# Patient Record
Sex: FEMALE | Race: WHITE | NOT HISPANIC OR LATINO | Employment: OTHER | URBAN - METROPOLITAN AREA
[De-identification: names, ages, dates, MRNs, and addresses within clinical notes are randomized per-mention and may not be internally consistent; named-entity substitution may affect disease eponyms.]

---

## 2017-01-04 ENCOUNTER — TELEPHONE (OUTPATIENT)
Dept: CARDIOLOGY | Facility: CLINIC | Age: 82
End: 2017-01-04

## 2017-03-30 ENCOUNTER — DOCUMENTATION (OUTPATIENT)
Dept: CARDIOLOGY | Facility: CLINIC | Age: 82
End: 2017-03-30

## 2017-04-06 ENCOUNTER — DOCUMENTATION (OUTPATIENT)
Dept: CARDIOLOGY | Facility: CLINIC | Age: 82
End: 2017-04-06

## 2017-04-06 DIAGNOSIS — R07.9 CHEST PAIN, UNSPECIFIED TYPE: Primary | ICD-10-CM

## 2017-04-06 NOTE — PROGRESS NOTES
I called and spoke with the patient. She would like to have the heart cath done here at [facility]. Order placed.

## 2017-04-13 DIAGNOSIS — I20.9 ANGINA PECTORIS (HCC): Primary | ICD-10-CM

## 2017-04-13 RX ORDER — ACETAMINOPHEN 325 MG/1
650 TABLET ORAL EVERY 4 HOURS PRN
Status: CANCELLED | OUTPATIENT
Start: 2017-04-13

## 2017-04-13 RX ORDER — SODIUM CHLORIDE 0.9 % (FLUSH) 0.9 %
1-10 SYRINGE (ML) INJECTION AS NEEDED
Status: CANCELLED | OUTPATIENT
Start: 2017-04-13

## 2017-04-13 RX ORDER — ONDANSETRON 2 MG/ML
4 INJECTION INTRAMUSCULAR; INTRAVENOUS EVERY 6 HOURS PRN
Status: CANCELLED | OUTPATIENT
Start: 2017-04-13

## 2017-04-13 RX ORDER — NITROGLYCERIN 0.4 MG/1
0.4 TABLET SUBLINGUAL
Status: CANCELLED | OUTPATIENT
Start: 2017-04-13

## 2017-04-26 ENCOUNTER — HOSPITAL ENCOUNTER (OUTPATIENT)
Facility: HOSPITAL | Age: 82
Discharge: HOME | End: 2017-04-27
Attending: INTERNAL MEDICINE | Admitting: INTERNAL MEDICINE

## 2017-04-26 DIAGNOSIS — R07.9 CHEST PAIN, UNSPECIFIED TYPE: ICD-10-CM

## 2017-04-26 DIAGNOSIS — I20.9 ANGINA PECTORIS (HCC): ICD-10-CM

## 2017-04-26 LAB
ALBUMIN SERPL-MCNC: 3.9 G/DL (ref 3.2–4.8)
ALBUMIN/GLOB SERPL: 1.5 G/DL (ref 1.5–2.5)
ALP SERPL-CCNC: 50 U/L (ref 25–100)
ALT SERPL W P-5'-P-CCNC: 13 U/L (ref 7–40)
ANION GAP SERPL CALCULATED.3IONS-SCNC: 4 MMOL/L (ref 3–11)
ARTICHOKE IGE QN: 95 MG/DL (ref 0–130)
AST SERPL-CCNC: 17 U/L (ref 0–33)
BILIRUB SERPL-MCNC: 0.5 MG/DL (ref 0.3–1.2)
BUN BLD-MCNC: 18 MG/DL (ref 9–23)
BUN/CREAT SERPL: 18 (ref 7–25)
CALCIUM SPEC-SCNC: 9.8 MG/DL (ref 8.7–10.4)
CHLORIDE SERPL-SCNC: 103 MMOL/L (ref 99–109)
CHOLEST SERPL-MCNC: 181 MG/DL (ref 0–200)
CO2 SERPL-SCNC: 30 MMOL/L (ref 20–31)
CREAT BLD-MCNC: 1 MG/DL (ref 0.6–1.3)
DEPRECATED RDW RBC AUTO: 52.3 FL (ref 37–54)
ERYTHROCYTE [DISTWIDTH] IN BLOOD BY AUTOMATED COUNT: 14.4 % (ref 11.3–14.5)
GFR SERPL CREATININE-BSD FRML MDRD: 53 ML/MIN/1.73
GLOBULIN UR ELPH-MCNC: 2.6 GM/DL
GLUCOSE BLD-MCNC: 103 MG/DL (ref 70–100)
HBA1C MFR BLD: 6.1 % (ref 4.8–5.6)
HCT VFR BLD AUTO: 40.5 % (ref 34.5–44)
HDLC SERPL-MCNC: 46 MG/DL (ref 40–60)
HGB BLD-MCNC: 12.8 G/DL (ref 11.5–15.5)
MCH RBC QN AUTO: 31.5 PG (ref 27–31)
MCHC RBC AUTO-ENTMCNC: 31.6 G/DL (ref 32–36)
MCV RBC AUTO: 99.8 FL (ref 80–99)
PLATELET # BLD AUTO: 185 10*3/MM3 (ref 150–450)
PMV BLD AUTO: 11.1 FL (ref 6–12)
POTASSIUM BLD-SCNC: 4.1 MMOL/L (ref 3.5–5.5)
PROT SERPL-MCNC: 6.5 G/DL (ref 5.7–8.2)
RBC # BLD AUTO: 4.06 10*6/MM3 (ref 3.89–5.14)
SODIUM BLD-SCNC: 137 MMOL/L (ref 132–146)
TRIGL SERPL-MCNC: 224 MG/DL (ref 0–150)
WBC NRBC COR # BLD: 11.58 10*3/MM3 (ref 3.5–10.8)

## 2017-04-26 PROCEDURE — 93571 IV DOP VEL&/PRESS C FLO 1ST: CPT | Performed by: INTERNAL MEDICINE

## 2017-04-26 PROCEDURE — 85027 COMPLETE CBC AUTOMATED: CPT | Performed by: NURSE PRACTITIONER

## 2017-04-26 PROCEDURE — 80053 COMPREHEN METABOLIC PANEL: CPT | Performed by: NURSE PRACTITIONER

## 2017-04-26 PROCEDURE — C1725 CATH, TRANSLUMIN NON-LASER: HCPCS | Performed by: INTERNAL MEDICINE

## 2017-04-26 PROCEDURE — 92928 PRQ TCAT PLMT NTRAC ST 1 LES: CPT | Performed by: INTERNAL MEDICINE

## 2017-04-26 PROCEDURE — C1874 STENT, COATED/COV W/DEL SYS: HCPCS | Performed by: INTERNAL MEDICINE

## 2017-04-26 PROCEDURE — 93454 CORONARY ARTERY ANGIO S&I: CPT | Performed by: INTERNAL MEDICINE

## 2017-04-26 PROCEDURE — 25010000002 HEPARIN (PORCINE) PER 1000 UNITS: Performed by: INTERNAL MEDICINE

## 2017-04-26 PROCEDURE — 80061 LIPID PANEL: CPT | Performed by: NURSE PRACTITIONER

## 2017-04-26 PROCEDURE — 83036 HEMOGLOBIN GLYCOSYLATED A1C: CPT | Performed by: NURSE PRACTITIONER

## 2017-04-26 PROCEDURE — C1769 GUIDE WIRE: HCPCS | Performed by: INTERNAL MEDICINE

## 2017-04-26 PROCEDURE — 93572 IV DOP VEL&/PRESS C FLO EA: CPT | Performed by: INTERNAL MEDICINE

## 2017-04-26 PROCEDURE — C1887 CATHETER, GUIDING: HCPCS | Performed by: INTERNAL MEDICINE

## 2017-04-26 PROCEDURE — 92978 ENDOLUMINL IVUS OCT C 1ST: CPT | Performed by: INTERNAL MEDICINE

## 2017-04-26 PROCEDURE — 99219 PR INITIAL OBSERVATION CARE/DAY 50 MINUTES: CPT | Performed by: INTERNAL MEDICINE

## 2017-04-26 PROCEDURE — 25010000002 FENTANYL CITRATE (PF) 100 MCG/2ML SOLUTION: Performed by: INTERNAL MEDICINE

## 2017-04-26 PROCEDURE — 0 IOPAMIDOL PER 1 ML: Performed by: INTERNAL MEDICINE

## 2017-04-26 PROCEDURE — 25010000002 BIVALIRUDIN PER 1 MG: Performed by: INTERNAL MEDICINE

## 2017-04-26 PROCEDURE — C1894 INTRO/SHEATH, NON-LASER: HCPCS | Performed by: INTERNAL MEDICINE

## 2017-04-26 PROCEDURE — 93005 ELECTROCARDIOGRAM TRACING: CPT | Performed by: PHYSICIAN ASSISTANT

## 2017-04-26 PROCEDURE — C9600 PERC DRUG-EL COR STENT SING: HCPCS | Performed by: INTERNAL MEDICINE

## 2017-04-26 PROCEDURE — G0378 HOSPITAL OBSERVATION PER HR: HCPCS

## 2017-04-26 PROCEDURE — C1753 CATH, INTRAVAS ULTRASOUND: HCPCS | Performed by: INTERNAL MEDICINE

## 2017-04-26 PROCEDURE — 25010000002 MIDAZOLAM PER 1 MG: Performed by: INTERNAL MEDICINE

## 2017-04-26 DEVICE — XIENCE ALPINE EVEROLIMUS ELUTING CORONARY STENT SYSTEM 2.50 MM X 23 MM / RAPID-EXCHANGE
Type: IMPLANTABLE DEVICE | Status: FUNCTIONAL
Brand: XIENCE ALPINE

## 2017-04-26 DEVICE — XIENCE ALPINE EVEROLIMUS ELUTING CORONARY STENT SYSTEM 3.00 MM X 18 MM / RAPID-EXCHANGE
Type: IMPLANTABLE DEVICE | Status: FUNCTIONAL
Brand: XIENCE ALPINE

## 2017-04-26 RX ORDER — ASPIRIN 81 MG/1
TABLET, CHEWABLE ORAL AS NEEDED
Status: DISCONTINUED | OUTPATIENT
Start: 2017-04-26 | End: 2017-04-26 | Stop reason: HOSPADM

## 2017-04-26 RX ORDER — CLONIDINE HYDROCHLORIDE 0.1 MG/1
0.1 TABLET ORAL NIGHTLY
COMMUNITY
End: 2020-08-26 | Stop reason: SDUPTHER

## 2017-04-26 RX ORDER — ISOSORBIDE MONONITRATE 30 MG/1
30 TABLET, EXTENDED RELEASE ORAL EVERY MORNING
COMMUNITY
End: 2020-01-09 | Stop reason: ALTCHOICE

## 2017-04-26 RX ORDER — LIDOCAINE HYDROCHLORIDE 10 MG/ML
INJECTION, SOLUTION INFILTRATION; PERINEURAL AS NEEDED
Status: DISCONTINUED | OUTPATIENT
Start: 2017-04-26 | End: 2017-04-26 | Stop reason: HOSPADM

## 2017-04-26 RX ORDER — HYDRALAZINE HYDROCHLORIDE 20 MG/ML
10 INJECTION INTRAMUSCULAR; INTRAVENOUS EVERY 6 HOURS PRN
Status: DISCONTINUED | OUTPATIENT
Start: 2017-04-26 | End: 2017-04-27 | Stop reason: HOSPADM

## 2017-04-26 RX ORDER — NITROGLYCERIN 0.4 MG/1
0.4 TABLET SUBLINGUAL
COMMUNITY
End: 2020-01-09 | Stop reason: SDUPTHER

## 2017-04-26 RX ORDER — AMLODIPINE BESYLATE 5 MG/1
5 TABLET ORAL EVERY MORNING
Status: DISCONTINUED | OUTPATIENT
Start: 2017-04-27 | End: 2017-04-27

## 2017-04-26 RX ORDER — ATORVASTATIN CALCIUM 40 MG/1
80 TABLET, FILM COATED ORAL NIGHTLY
Status: DISCONTINUED | OUTPATIENT
Start: 2017-04-26 | End: 2017-04-27 | Stop reason: HOSPADM

## 2017-04-26 RX ORDER — ACETAMINOPHEN 325 MG/1
650 TABLET ORAL EVERY 8 HOURS PRN
Status: DISCONTINUED | OUTPATIENT
Start: 2017-04-26 | End: 2017-04-27 | Stop reason: HOSPADM

## 2017-04-26 RX ORDER — SODIUM CHLORIDE 9 MG/ML
1-3 INJECTION, SOLUTION INTRAVENOUS CONTINUOUS
Status: DISCONTINUED | OUTPATIENT
Start: 2017-04-26 | End: 2017-04-27 | Stop reason: HOSPADM

## 2017-04-26 RX ORDER — CLOPIDOGREL BISULFATE 75 MG/1
TABLET ORAL AS NEEDED
Status: DISCONTINUED | OUTPATIENT
Start: 2017-04-26 | End: 2017-04-26 | Stop reason: HOSPADM

## 2017-04-26 RX ORDER — DONEPEZIL HYDROCHLORIDE 5 MG/1
5 TABLET, FILM COATED ORAL EVERY MORNING
COMMUNITY
End: 2019-01-17

## 2017-04-26 RX ORDER — DOFETILIDE 0.12 MG/1
125 CAPSULE ORAL EVERY 12 HOURS SCHEDULED
Status: DISCONTINUED | OUTPATIENT
Start: 2017-04-26 | End: 2017-04-27 | Stop reason: HOSPADM

## 2017-04-26 RX ORDER — TRAMADOL HYDROCHLORIDE 50 MG/1
100 TABLET ORAL EVERY 6 HOURS PRN
Status: DISCONTINUED | OUTPATIENT
Start: 2017-04-26 | End: 2017-04-27 | Stop reason: HOSPADM

## 2017-04-26 RX ORDER — L.RHAMNOSUS/B.ANIMALIS(LACTIS) 3B CELL
1 CAPSULE ORAL EVERY MORNING
COMMUNITY

## 2017-04-26 RX ORDER — PROPRANOLOL HYDROCHLORIDE 80 MG/1
80 TABLET ORAL EVERY MORNING
COMMUNITY
End: 2017-05-17

## 2017-04-26 RX ORDER — ISOSORBIDE MONONITRATE 60 MG/1
30 TABLET, EXTENDED RELEASE ORAL EVERY MORNING
Status: DISCONTINUED | OUTPATIENT
Start: 2017-04-27 | End: 2017-04-27 | Stop reason: HOSPADM

## 2017-04-26 RX ORDER — SODIUM CHLORIDE 9 MG/ML
1 INJECTION, SOLUTION INTRAVENOUS CONTINUOUS
Status: ACTIVE | OUTPATIENT
Start: 2017-04-26 | End: 2017-04-26

## 2017-04-26 RX ORDER — PROPRANOLOL HCL 60 MG
120 CAPSULE, EXTENDED RELEASE 24HR ORAL DAILY
Status: DISCONTINUED | OUTPATIENT
Start: 2017-04-27 | End: 2017-04-27 | Stop reason: HOSPADM

## 2017-04-26 RX ORDER — SENNOSIDES 8.6 MG
1300 CAPSULE ORAL EVERY 8 HOURS PRN
Status: ON HOLD | COMMUNITY
End: 2017-10-10

## 2017-04-26 RX ORDER — AMLODIPINE BESYLATE 5 MG/1
5 TABLET ORAL EVERY MORNING
COMMUNITY
End: 2017-04-27 | Stop reason: HOSPADM

## 2017-04-26 RX ORDER — ESCITALOPRAM OXALATE 10 MG/1
10 TABLET ORAL EVERY MORNING
Status: DISCONTINUED | OUTPATIENT
Start: 2017-04-27 | End: 2017-04-27 | Stop reason: HOSPADM

## 2017-04-26 RX ORDER — MIDAZOLAM HYDROCHLORIDE 1 MG/ML
INJECTION INTRAMUSCULAR; INTRAVENOUS AS NEEDED
Status: DISCONTINUED | OUTPATIENT
Start: 2017-04-26 | End: 2017-04-26 | Stop reason: HOSPADM

## 2017-04-26 RX ORDER — DONEPEZIL HYDROCHLORIDE 5 MG/1
5 TABLET, FILM COATED ORAL EVERY MORNING
Status: DISCONTINUED | OUTPATIENT
Start: 2017-04-27 | End: 2017-04-27 | Stop reason: HOSPADM

## 2017-04-26 RX ORDER — NITROGLYCERIN 0.4 MG/1
0.4 TABLET SUBLINGUAL
Status: DISCONTINUED | OUTPATIENT
Start: 2017-04-26 | End: 2017-04-27 | Stop reason: HOSPADM

## 2017-04-26 RX ORDER — ENALAPRIL MALEATE 5 MG/1
5 TABLET ORAL 2 TIMES DAILY
Status: DISCONTINUED | OUTPATIENT
Start: 2017-04-26 | End: 2017-04-27 | Stop reason: HOSPADM

## 2017-04-26 RX ORDER — NITROGLYCERIN 0.4 MG/1
0.4 TABLET SUBLINGUAL
Status: DISCONTINUED | OUTPATIENT
Start: 2017-04-26 | End: 2017-04-26 | Stop reason: HOSPADM

## 2017-04-26 RX ORDER — CLOPIDOGREL BISULFATE 75 MG/1
75 TABLET ORAL DAILY
Status: DISCONTINUED | OUTPATIENT
Start: 2017-04-27 | End: 2017-04-27 | Stop reason: HOSPADM

## 2017-04-26 RX ORDER — SODIUM CHLORIDE 0.9 % (FLUSH) 0.9 %
1-10 SYRINGE (ML) INJECTION AS NEEDED
Status: DISCONTINUED | OUTPATIENT
Start: 2017-04-26 | End: 2017-04-26 | Stop reason: HOSPADM

## 2017-04-26 RX ORDER — TRAMADOL HYDROCHLORIDE 50 MG/1
50 TABLET ORAL EVERY 6 HOURS PRN
Status: DISCONTINUED | OUTPATIENT
Start: 2017-04-26 | End: 2017-04-26

## 2017-04-26 RX ORDER — CLONIDINE HYDROCHLORIDE 0.1 MG/1
0.1 TABLET ORAL DAILY PRN
Status: DISCONTINUED | OUTPATIENT
Start: 2017-04-26 | End: 2017-04-27 | Stop reason: HOSPADM

## 2017-04-26 RX ORDER — HYDRALAZINE HYDROCHLORIDE 20 MG/ML
20 INJECTION INTRAMUSCULAR; INTRAVENOUS EVERY 6 HOURS PRN
Status: DISCONTINUED | OUTPATIENT
Start: 2017-04-26 | End: 2017-04-26

## 2017-04-26 RX ORDER — PROPRANOLOL HYDROCHLORIDE 40 MG/1
20 TABLET ORAL EVERY MORNING
COMMUNITY
End: 2017-05-17

## 2017-04-26 RX ORDER — PANTOPRAZOLE SODIUM 40 MG/1
40 TABLET, DELAYED RELEASE ORAL
Status: DISCONTINUED | OUTPATIENT
Start: 2017-04-27 | End: 2017-04-27 | Stop reason: HOSPADM

## 2017-04-26 RX ORDER — FENTANYL CITRATE 50 UG/ML
INJECTION, SOLUTION INTRAMUSCULAR; INTRAVENOUS AS NEEDED
Status: DISCONTINUED | OUTPATIENT
Start: 2017-04-26 | End: 2017-04-26 | Stop reason: HOSPADM

## 2017-04-26 RX ORDER — TRAMADOL HYDROCHLORIDE 50 MG/1
50 TABLET ORAL 3 TIMES DAILY
COMMUNITY

## 2017-04-26 RX ORDER — ONDANSETRON 2 MG/ML
4 INJECTION INTRAMUSCULAR; INTRAVENOUS EVERY 6 HOURS PRN
Status: DISCONTINUED | OUTPATIENT
Start: 2017-04-26 | End: 2017-04-26 | Stop reason: HOSPADM

## 2017-04-26 RX ORDER — ACETAMINOPHEN 325 MG/1
650 TABLET ORAL EVERY 4 HOURS PRN
Status: DISCONTINUED | OUTPATIENT
Start: 2017-04-26 | End: 2017-04-26 | Stop reason: HOSPADM

## 2017-04-26 RX ADMIN — ENALAPRIL MALEATE 5 MG: 5 TABLET ORAL at 18:44

## 2017-04-26 RX ADMIN — TRAMADOL HYDROCHLORIDE 100 MG: 50 TABLET, COATED ORAL at 18:43

## 2017-04-26 RX ADMIN — DOFETILIDE 125 MCG: 0.12 CAPSULE ORAL at 21:22

## 2017-04-26 RX ADMIN — SODIUM CHLORIDE 2.99 ML/KG/HR: 9 INJECTION, SOLUTION INTRAVENOUS at 09:53

## 2017-04-26 RX ADMIN — ATORVASTATIN CALCIUM 80 MG: 40 TABLET, FILM COATED ORAL at 21:22

## 2017-04-27 ENCOUNTER — DOCUMENTATION (OUTPATIENT)
Dept: CARDIAC REHAB | Facility: HOSPITAL | Age: 82
End: 2017-04-27

## 2017-04-27 VITALS
BODY MASS INDEX: 25.27 KG/M2 | DIASTOLIC BLOOD PRESSURE: 53 MMHG | HEART RATE: 69 BPM | SYSTOLIC BLOOD PRESSURE: 105 MMHG | TEMPERATURE: 97.7 F | HEIGHT: 65 IN | WEIGHT: 151.68 LBS | RESPIRATION RATE: 18 BRPM | OXYGEN SATURATION: 95 %

## 2017-04-27 LAB
ANION GAP SERPL CALCULATED.3IONS-SCNC: 5 MMOL/L (ref 3–11)
BUN BLD-MCNC: 14 MG/DL (ref 9–23)
BUN/CREAT SERPL: 14 (ref 7–25)
CALCIUM SPEC-SCNC: 9.7 MG/DL (ref 8.7–10.4)
CHLORIDE SERPL-SCNC: 106 MMOL/L (ref 99–109)
CO2 SERPL-SCNC: 28 MMOL/L (ref 20–31)
CREAT BLD-MCNC: 1 MG/DL (ref 0.6–1.3)
DEPRECATED RDW RBC AUTO: 51.8 FL (ref 37–54)
ERYTHROCYTE [DISTWIDTH] IN BLOOD BY AUTOMATED COUNT: 14.4 % (ref 11.3–14.5)
GFR SERPL CREATININE-BSD FRML MDRD: 53 ML/MIN/1.73
GLUCOSE BLD-MCNC: 101 MG/DL (ref 70–100)
HCT VFR BLD AUTO: 41 % (ref 34.5–44)
HGB BLD-MCNC: 12.9 G/DL (ref 11.5–15.5)
MCH RBC QN AUTO: 31.1 PG (ref 27–31)
MCHC RBC AUTO-ENTMCNC: 31.5 G/DL (ref 32–36)
MCV RBC AUTO: 98.8 FL (ref 80–99)
PLATELET # BLD AUTO: 172 10*3/MM3 (ref 150–450)
PMV BLD AUTO: 11.6 FL (ref 6–12)
POTASSIUM BLD-SCNC: 3.8 MMOL/L (ref 3.5–5.5)
RBC # BLD AUTO: 4.15 10*6/MM3 (ref 3.89–5.14)
SODIUM BLD-SCNC: 139 MMOL/L (ref 132–146)
WBC NRBC COR # BLD: 12.7 10*3/MM3 (ref 3.5–10.8)

## 2017-04-27 PROCEDURE — G0378 HOSPITAL OBSERVATION PER HR: HCPCS

## 2017-04-27 PROCEDURE — 85027 COMPLETE CBC AUTOMATED: CPT | Performed by: INTERNAL MEDICINE

## 2017-04-27 PROCEDURE — 99217 PR OBSERVATION CARE DISCHARGE MANAGEMENT: CPT | Performed by: INTERNAL MEDICINE

## 2017-04-27 PROCEDURE — 80048 BASIC METABOLIC PNL TOTAL CA: CPT | Performed by: INTERNAL MEDICINE

## 2017-04-27 RX ORDER — CLOPIDOGREL BISULFATE 75 MG/1
75 TABLET ORAL DAILY
Qty: 30 TABLET | Refills: 11 | Status: SHIPPED | OUTPATIENT
Start: 2017-04-27 | End: 2018-10-17

## 2017-04-27 RX ADMIN — AMLODIPINE BESYLATE 5 MG: 5 TABLET ORAL at 07:16

## 2017-04-27 RX ADMIN — CLOPIDOGREL BISULFATE 75 MG: 75 TABLET ORAL at 08:07

## 2017-04-27 RX ADMIN — PANTOPRAZOLE SODIUM 40 MG: 40 TABLET, DELAYED RELEASE ORAL at 07:08

## 2017-04-27 RX ADMIN — PROPRANOLOL HYDROCHLORIDE 120 MG: 60 CAPSULE, EXTENDED RELEASE ORAL at 08:07

## 2017-04-27 RX ADMIN — DOFETILIDE 125 MCG: 0.12 CAPSULE ORAL at 08:07

## 2017-04-27 RX ADMIN — DONEPEZIL HYDROCHLORIDE 5 MG: 5 TABLET, FILM COATED ORAL at 07:17

## 2017-04-27 RX ADMIN — ENALAPRIL MALEATE 5 MG: 5 TABLET ORAL at 08:07

## 2017-04-27 RX ADMIN — ESCITALOPRAM OXALATE 10 MG: 10 TABLET ORAL at 07:17

## 2017-04-27 RX ADMIN — APIXABAN 2.5 MG: 2.5 TABLET, FILM COATED ORAL at 08:07

## 2017-04-27 RX ADMIN — ISOSORBIDE MONONITRATE 30 MG: 60 TABLET, EXTENDED RELEASE ORAL at 07:17

## 2017-04-27 NOTE — PROGRESS NOTES
Pt. Referred for Phase II Cardiac Rehab. Staff discussed benefits of exercise, program protocol, and educational material provided. Teach back verified.  Permission granted from patient for staff to fax referral information to outlying program at this time.  Staff to fax referral info to [facility].

## 2017-05-17 ENCOUNTER — OFFICE VISIT (OUTPATIENT)
Dept: CARDIOLOGY | Facility: CLINIC | Age: 82
End: 2017-05-17

## 2017-05-17 VITALS
BODY MASS INDEX: 25.19 KG/M2 | HEIGHT: 65 IN | WEIGHT: 151.2 LBS | HEART RATE: 76 BPM | SYSTOLIC BLOOD PRESSURE: 112 MMHG | DIASTOLIC BLOOD PRESSURE: 64 MMHG

## 2017-05-17 DIAGNOSIS — I10 ESSENTIAL HYPERTENSION: ICD-10-CM

## 2017-05-17 DIAGNOSIS — I48.0 PAROXYSMAL ATRIAL FIBRILLATION (HCC): Primary | ICD-10-CM

## 2017-05-17 DIAGNOSIS — R00.1 BRADYCARDIA: ICD-10-CM

## 2017-05-17 DIAGNOSIS — I25.10 CORONARY ARTERY DISEASE INVOLVING NATIVE CORONARY ARTERY OF NATIVE HEART WITHOUT ANGINA PECTORIS: ICD-10-CM

## 2017-05-17 PROCEDURE — 93288 INTERROG EVL PM/LDLS PM IP: CPT | Performed by: INTERNAL MEDICINE

## 2017-05-17 PROCEDURE — 99213 OFFICE O/P EST LOW 20 MIN: CPT | Performed by: INTERNAL MEDICINE

## 2017-05-17 RX ORDER — LORAZEPAM 0.5 MG/1
0.5 TABLET ORAL EVERY 8 HOURS PRN
COMMUNITY
End: 2017-10-09

## 2017-05-17 RX ORDER — ASPIRIN 81 MG/1
81 TABLET ORAL DAILY
COMMUNITY
End: 2017-10-09

## 2017-07-19 ENCOUNTER — TELEPHONE (OUTPATIENT)
Dept: CARDIOLOGY | Facility: CLINIC | Age: 82
End: 2017-07-19

## 2017-07-19 NOTE — TELEPHONE ENCOUNTER
Called to make sure transmission came in.  Checking on battery.  Has <1 to 10 months left.  Will continue monthly checks.

## 2017-08-23 ENCOUNTER — TELEPHONE (OUTPATIENT)
Dept: CARDIOLOGY | Facility: CLINIC | Age: 82
End: 2017-08-23

## 2017-08-23 NOTE — TELEPHONE ENCOUNTER
Daughter called to verify her [facility] transmission came through successfully.  Explained to daughter going to go ahead and schedule for generator change.

## 2017-08-30 ENCOUNTER — DOCUMENTATION (OUTPATIENT)
Dept: CARDIOLOGY | Facility: CLINIC | Age: 82
End: 2017-08-30

## 2017-08-30 NOTE — PROGRESS NOTES
I called the patient to let her know that her Tikosyn is here at the office. She is going to pick it up. I took it to the .

## 2017-09-29 ENCOUNTER — PREP FOR SURGERY (OUTPATIENT)
Dept: OTHER | Facility: HOSPITAL | Age: 82
End: 2017-09-29

## 2017-09-29 DIAGNOSIS — R00.1 BRADYCARDIA, SEVERE SINUS: Primary | ICD-10-CM

## 2017-09-29 RX ORDER — PROMETHAZINE HYDROCHLORIDE 25 MG/ML
12.5 INJECTION, SOLUTION INTRAMUSCULAR; INTRAVENOUS EVERY 4 HOURS PRN
Status: CANCELLED | OUTPATIENT
Start: 2017-09-29

## 2017-09-29 RX ORDER — SODIUM CHLORIDE 0.9 % (FLUSH) 0.9 %
1-10 SYRINGE (ML) INJECTION AS NEEDED
Status: CANCELLED | OUTPATIENT
Start: 2017-09-29

## 2017-09-29 RX ORDER — ACETAMINOPHEN 325 MG/1
650 TABLET ORAL EVERY 4 HOURS PRN
Status: CANCELLED | OUTPATIENT
Start: 2017-09-29

## 2017-09-29 RX ORDER — VANCOMYCIN HYDROCHLORIDE 1 G/200ML
15 INJECTION, SOLUTION INTRAVENOUS
Status: CANCELLED | OUTPATIENT
Start: 2017-09-29

## 2017-10-09 ENCOUNTER — APPOINTMENT (OUTPATIENT)
Dept: PREADMISSION TESTING | Facility: HOSPITAL | Age: 82
End: 2017-10-09

## 2017-10-09 DIAGNOSIS — R00.1 BRADYCARDIA, SEVERE SINUS: ICD-10-CM

## 2017-10-09 LAB
ANION GAP SERPL CALCULATED.3IONS-SCNC: 3 MMOL/L (ref 3–11)
BUN BLD-MCNC: 14 MG/DL (ref 9–23)
BUN/CREAT SERPL: 14 (ref 7–25)
CALCIUM SPEC-SCNC: 9.8 MG/DL (ref 8.7–10.4)
CHLORIDE SERPL-SCNC: 105 MMOL/L (ref 99–109)
CO2 SERPL-SCNC: 32 MMOL/L (ref 20–31)
CREAT BLD-MCNC: 1 MG/DL (ref 0.6–1.3)
DEPRECATED RDW RBC AUTO: 48.4 FL (ref 37–54)
ERYTHROCYTE [DISTWIDTH] IN BLOOD BY AUTOMATED COUNT: 13.8 % (ref 11.3–14.5)
GFR SERPL CREATININE-BSD FRML MDRD: 53 ML/MIN/1.73
GLUCOSE BLD-MCNC: 111 MG/DL (ref 70–100)
HBA1C MFR BLD: 5.8 % (ref 4.8–5.6)
HCT VFR BLD AUTO: 44.7 % (ref 34.5–44)
HGB BLD-MCNC: 14.3 G/DL (ref 11.5–15.5)
INR PPP: 1.01
MCH RBC QN AUTO: 30.4 PG (ref 27–31)
MCHC RBC AUTO-ENTMCNC: 32 G/DL (ref 32–36)
MCV RBC AUTO: 95.1 FL (ref 80–99)
PLATELET # BLD AUTO: 168 10*3/MM3 (ref 150–450)
PMV BLD AUTO: 11.1 FL (ref 6–12)
POTASSIUM BLD-SCNC: 4.1 MMOL/L (ref 3.5–5.5)
PROTHROMBIN TIME: 11 SECONDS (ref 9.6–11.5)
RBC # BLD AUTO: 4.7 10*6/MM3 (ref 3.89–5.14)
SODIUM BLD-SCNC: 140 MMOL/L (ref 132–146)
WBC NRBC COR # BLD: 11.12 10*3/MM3 (ref 3.5–10.8)

## 2017-10-09 PROCEDURE — 83036 HEMOGLOBIN GLYCOSYLATED A1C: CPT | Performed by: PHYSICIAN ASSISTANT

## 2017-10-09 PROCEDURE — 85027 COMPLETE CBC AUTOMATED: CPT | Performed by: PHYSICIAN ASSISTANT

## 2017-10-09 PROCEDURE — 85610 PROTHROMBIN TIME: CPT | Performed by: PHYSICIAN ASSISTANT

## 2017-10-09 PROCEDURE — 36415 COLL VENOUS BLD VENIPUNCTURE: CPT

## 2017-10-09 PROCEDURE — 80048 BASIC METABOLIC PNL TOTAL CA: CPT | Performed by: PHYSICIAN ASSISTANT

## 2017-10-09 RX ORDER — ATORVASTATIN CALCIUM 40 MG/1
40 TABLET, FILM COATED ORAL NIGHTLY
COMMUNITY

## 2017-10-10 ENCOUNTER — HOSPITAL ENCOUNTER (OUTPATIENT)
Facility: HOSPITAL | Age: 82
Discharge: HOME | End: 2017-10-10
Attending: INTERNAL MEDICINE | Admitting: INTERNAL MEDICINE

## 2017-10-10 VITALS
HEART RATE: 65 BPM | DIASTOLIC BLOOD PRESSURE: 85 MMHG | BODY MASS INDEX: 25.27 KG/M2 | WEIGHT: 151.68 LBS | OXYGEN SATURATION: 94 % | HEIGHT: 65 IN | RESPIRATION RATE: 13 BRPM | SYSTOLIC BLOOD PRESSURE: 117 MMHG | TEMPERATURE: 97.9 F

## 2017-10-10 PROCEDURE — C1785 PMKR, DUAL, RATE-RESP: HCPCS | Performed by: INTERNAL MEDICINE

## 2017-10-10 PROCEDURE — 33228 REMV&REPLC PM GEN DUAL LEAD: CPT | Performed by: INTERNAL MEDICINE

## 2017-10-10 PROCEDURE — G0378 HOSPITAL OBSERVATION PER HR: HCPCS

## 2017-10-10 PROCEDURE — 25010000002 FENTANYL CITRATE (PF) 100 MCG/2ML SOLUTION: Performed by: INTERNAL MEDICINE

## 2017-10-10 PROCEDURE — 25010000002 ONDANSETRON PER 1 MG: Performed by: INTERNAL MEDICINE

## 2017-10-10 PROCEDURE — 25010000002 VANCOMYCIN PER 500 MG: Performed by: PHYSICIAN ASSISTANT

## 2017-10-10 PROCEDURE — 25010000002 MIDAZOLAM PER 1 MG: Performed by: INTERNAL MEDICINE

## 2017-10-10 DEVICE — GEN PM ASSURITY DR RF PM2240 MERLIN: Type: IMPLANTABLE DEVICE | Status: FUNCTIONAL

## 2017-10-10 RX ORDER — ONDANSETRON 2 MG/ML
4 INJECTION INTRAMUSCULAR; INTRAVENOUS EVERY 6 HOURS PRN
Status: CANCELLED | OUTPATIENT
Start: 2017-10-10

## 2017-10-10 RX ORDER — PROMETHAZINE HYDROCHLORIDE 25 MG/ML
12.5 INJECTION, SOLUTION INTRAMUSCULAR; INTRAVENOUS EVERY 4 HOURS PRN
Status: DISCONTINUED | OUTPATIENT
Start: 2017-10-10 | End: 2017-10-10 | Stop reason: HOSPADM

## 2017-10-10 RX ORDER — LIDOCAINE HYDROCHLORIDE 10 MG/ML
INJECTION, SOLUTION INFILTRATION; PERINEURAL AS NEEDED
Status: DISCONTINUED | OUTPATIENT
Start: 2017-10-10 | End: 2017-10-10 | Stop reason: HOSPADM

## 2017-10-10 RX ORDER — ONDANSETRON 2 MG/ML
INJECTION INTRAMUSCULAR; INTRAVENOUS AS NEEDED
Status: DISCONTINUED | OUTPATIENT
Start: 2017-10-10 | End: 2017-10-10 | Stop reason: HOSPADM

## 2017-10-10 RX ORDER — FENTANYL CITRATE 50 UG/ML
INJECTION, SOLUTION INTRAMUSCULAR; INTRAVENOUS AS NEEDED
Status: DISCONTINUED | OUTPATIENT
Start: 2017-10-10 | End: 2017-10-10 | Stop reason: HOSPADM

## 2017-10-10 RX ORDER — SODIUM CHLORIDE 0.9 % (FLUSH) 0.9 %
1-10 SYRINGE (ML) INJECTION AS NEEDED
Status: DISCONTINUED | OUTPATIENT
Start: 2017-10-10 | End: 2017-10-10 | Stop reason: HOSPADM

## 2017-10-10 RX ORDER — SODIUM CHLORIDE 9 MG/ML
INJECTION, SOLUTION INTRAVENOUS CONTINUOUS PRN
Status: DISCONTINUED | OUTPATIENT
Start: 2017-10-10 | End: 2017-10-10 | Stop reason: HOSPADM

## 2017-10-10 RX ORDER — BUPIVACAINE HYDROCHLORIDE 5 MG/ML
INJECTION, SOLUTION EPIDURAL; INTRACAUDAL AS NEEDED
Status: DISCONTINUED | OUTPATIENT
Start: 2017-10-10 | End: 2017-10-10 | Stop reason: HOSPADM

## 2017-10-10 RX ORDER — VANCOMYCIN HYDROCHLORIDE 1 G/200ML
15 INJECTION, SOLUTION INTRAVENOUS
Status: COMPLETED | OUTPATIENT
Start: 2017-10-10 | End: 2017-10-10

## 2017-10-10 RX ORDER — ACETAMINOPHEN 325 MG/1
650 TABLET ORAL EVERY 4 HOURS PRN
Status: CANCELLED | OUTPATIENT
Start: 2017-10-10

## 2017-10-10 RX ORDER — MIDAZOLAM HYDROCHLORIDE 1 MG/ML
INJECTION INTRAMUSCULAR; INTRAVENOUS AS NEEDED
Status: DISCONTINUED | OUTPATIENT
Start: 2017-10-10 | End: 2017-10-10 | Stop reason: HOSPADM

## 2017-10-10 RX ORDER — HYDROCODONE BITARTRATE AND ACETAMINOPHEN 5; 325 MG/1; MG/1
1 TABLET ORAL EVERY 4 HOURS PRN
Status: CANCELLED | OUTPATIENT
Start: 2017-10-10 | End: 2017-10-20

## 2017-10-10 RX ORDER — SODIUM CHLORIDE 0.9 % (FLUSH) 0.9 %
1-10 SYRINGE (ML) INJECTION AS NEEDED
Status: CANCELLED | OUTPATIENT
Start: 2017-10-10

## 2017-10-10 RX ORDER — ACETAMINOPHEN 325 MG/1
650 TABLET ORAL EVERY 4 HOURS PRN
Status: DISCONTINUED | OUTPATIENT
Start: 2017-10-10 | End: 2017-10-10 | Stop reason: HOSPADM

## 2017-10-10 RX ADMIN — VANCOMYCIN HYDROCHLORIDE 1000 MG: 1 INJECTION, SOLUTION INTRAVENOUS at 09:33

## 2017-10-10 NOTE — PLAN OF CARE
Problem: Patient Care Overview (Adult)  Goal: Plan of Care Review  Outcome: Ongoing (interventions implemented as appropriate)  HERE FOR PPM GEN CHANGE; PROCEDURE TEACHING DONE AT [facility] PER BRAEDEN SARABIA    10/10/17 0811   Coping/Psychosocial Response Interventions   Plan Of Care Reviewed With patient   Patient Care Overview   Progress no change         Problem: Cardiac Rhythm Management Device (Adult)  Goal: Signs and Symptoms of Listed Potential Problems Will be Absent or Manageable (Cardiac Rhythm Management Device)  Outcome: Ongoing (interventions implemented as appropriate)  ORIENTED TO ROOM AND UNIT UPON ARRIVAL TO [facility]    10/10/17 0702   Cardiac Rhythm Management Device   Problems Assessed (Cardiac Rhythm Management Device) other (see comments)   Problems Present (Cardiac Rhythm Management Device) other (see comments)

## 2017-10-10 NOTE — PLAN OF CARE
Problem: Patient Care Overview (Adult)  Goal: Plan of Care Review  Outcome: Outcome(s) achieved Date Met:  10/10/17    10/10/17 1141   Coping/Psychosocial Response Interventions   Plan Of Care Reviewed With patient   Patient Care Overview   Progress no change   Outcome Evaluation   Outcome Summary/Follow up Plan Patient's site stable at time of discharge. The patient is being discharged home wiht family.        Goal: Adult Individualization and Mutuality  Outcome: Outcome(s) achieved Date Met:  10/10/17  Goal: Discharge Needs Assessment  Outcome: Outcome(s) achieved Date Met:  10/10/17    Problem: Cardiac Rhythm Management Device (Adult)  Goal: Signs and Symptoms of Listed Potential Problems Will be Absent or Manageable (Cardiac Rhythm Management Device)  Outcome: Outcome(s) achieved Date Met:  10/10/17

## 2017-10-10 NOTE — H&P
[facility] at [facility] - Cardiology History & Physical     Chary Boucher  11/18/1933  2526/1      PCP:  Saige Tobias MD  Chary Boucher is a 83 y.o.  female.    10/10/17    Chief Complaint: Pacemaker at [facility]    Problem List/PMHx:   1. Coronary disease:  a. Left heart catheterization 2002, multi-vessel coronary disease/medical therapy.  b. Stress Cardiolite 2004, no ischemia.  c. Chest pain/left heart catheterization, December 2008, Dr. Mendoza revealing significant LV stenosis treated with drug eluting stent. Normal LV function/”spasm” right coronary artery (catheter induced).  d. Stress Cardiolite, 07/27/2010: Mild anterior ischemia with questionable artifact. Normal LV size and function.   e. Lexiscan Cardiolite, 03/25/2015 showing left ventricular ejection fraction 75%, no ischemia.   f. [facility] Dr Araujo with PTCA/stenting of LAD and LCFX with ESTEPHANIA 4/2017  2. Peripheral vascular disease:  a. Severe right common iliac stenosis.  b. Abdominal aortic aneurysm measuring 2 cm in March 2008.  c. Status post successful PTCA and placement of stent, December 2008.  d. Status-post PTCA and stenting of the right ICA, 09/25/2008.   3. Atrial fibrillation:  a. Tikosyn therapy 10/25/2007.  b. Medtronic dual chamber pacemaker.  4. Essential Hypertension.  5. Dyslipidemia.  6. Degenerative joint disease/arthritis.  7. GERD.  8. Osteoporosis.  9. Abdominal aortic aneurysm:  a. Ultrasound of the abdomen on 03/18/2015 with size measured at 2.3 centimeters.        Allergies:  is allergic to adhesive tape; celebrex [celecoxib]; epinephrine; keflex [cephalexin]; niaspan [niacin er]; and pacerone [amiodarone].    Prescriptions Prior to Admission   Medication Sig Dispense Refill Last Dose   • apixaban (ELIQUIS) 2.5 MG tablet tablet Take 2.5 mg by mouth 2 (Two) Times a Day.   10/10/2017 at 0515   • atorvastatin (LIPITOR) 40 MG tablet Take 40 mg by mouth Every Night.   10/9/2017 at Unknown time   • Calcium  Carb-Cholecalciferol (CALCIUM 600 + D PO) Take 1 tablet by mouth Every Night.   10/9/2017 at Unknown time   • clopidogrel (PLAVIX) 75 MG tablet Take 1 tablet by mouth Daily. (Patient taking differently: Take 75 mg by mouth Every Morning.) 30 tablet 11 10/10/2017 at 0515   • dofetilide (TIKOSYN) 125 MCG capsule Take 1 capsule by mouth 2 (Two) Times a Day. 180 capsule 2 10/9/2017 at Unknown time   • donepezil (ARICEPT) 5 MG tablet Take 5 mg by mouth Every Morning.   10/10/2017 at 0515   • enalapril (VASOTEC) 10 MG tablet Take 5 mg by mouth Every Morning.   10/10/2017 at 0515   • escitalopram (LEXAPRO) 10 MG tablet Take 10 mg by mouth Every Morning.   10/10/2017 at 0515   • isosorbide mononitrate (IMDUR) 30 MG 24 hr tablet Take 30 mg by mouth Every Morning.   10/10/2017 at 0515   • methylcellulose, Laxative, (CITRUCEL) 500 MG tablet tablet Take 2 tablets by mouth Daily.   10/9/2017 at Unknown time   • omeprazole (PriLOSEC) 20 MG capsule Take 20 mg by mouth Every Morning.   10/10/2017 at 0515   • Probiotic Product (Hippflow) capsule Take 1 capsule by mouth Every Morning.   10/10/2017 at 0515   • PROPRANOLOL HCL PO Take 100 mg by mouth 2 (Two) Times a Day.   10/10/2017 at 0515   • tiotropium (SPIRIVA) 18 MCG per inhalation capsule Place 1 capsule into inhaler and inhale Daily.   Past Week at Unknown time   • traMADol (ULTRAM) 50 MG tablet Take  by mouth 3 (Three) Times a Day. TAKES 100 MG PO QAM, 100 MG PO AT 1500, AND 1 TAB PO QHS   10/10/2017 at 0515   • CloNIDine (CATAPRES) 0.1 MG tablet Take 0.1 mg by mouth Daily As Needed for High Blood Pressure (for SBP>170).   Unknown at Unknown time   • Fluticasone-Salmeterol (ADVAIR HFA IN) Inhale 1 puff Daily.   Unknown at Unknown time   • nitroglycerin (NITROSTAT) 0.4 MG SL tablet Place 0.4 mg under the tongue Every 5 (Five) Minutes As Needed for Chest Pain. Take no more than 3 doses in 15 minutes.   Unknown at Unknown time          CARDIAC RISK FACTORS:  "  advanced age (older than 55 for men, 65 for women), dyslipidemia and hypertension.     HPI:  82 yo CF with the noted above history.  She is here today to undergo generator change of her permanent pacemaker.  She has been doing well since last seen in the office back in May.  She did undergo cardiac catheterization in April with ESTEPHANIA x2.  She denies any exertional angina or dyspnea.  She is tolerating the use of Plavix and Eliquis without any complaints of bleeding.  She does bruise easily.      Social History     Social History   • Marital status:      Spouse name: N/A   • Number of children: N/A   • Years of education: N/A     Occupational History   • Not on file.     Social History Main Topics   • Smoking status: Former Smoker     Years: 15.00     Types: Cigarettes     Quit date: 1/9/1987   • Smokeless tobacco: Never Used      Comment: STATES QUIT SMOKING IN 1987   • Alcohol use No   • Drug use: No   • Sexual activity: Defer     Other Topics Concern   • Not on file     Social History Narrative     History reviewed. No pertinent family history.  Past Surgical History:   Procedure Laterality Date   • APPENDECTOMY     • CARDIAC CATHETERIZATION N/A 4/26/2017    Procedure: Left Heart Cath;  Surgeon: Jesus Araujo MD;  Location: [facility] CATH INVASIVE LOC[facility];  Service:    • CAROTID STENT Right    • CARPAL TUNNEL RELEASE Bilateral    • CATARACT EXTRACTION BILATERAL W/ ANTERIOR VITRECTOMY     • CHOLECYSTECTOMY     • CORONARY STENT PLACEMENT      TO THE LAD   • ILIAC ARTERY STENT     • PACEMAKER IMPLANTATION  2008    PER DR. BASSETT   • REPLACEMENT TOTAL KNEE BILATERAL      3-4 YEARS IN BETWEEN    • ROTATOR CUFF REPAIR Right    • TUBAL ABDOMINAL LIGATION         Review of Systems:  Pertinent positives are listed above and in physical exam.  All others have been reviewed and are negative.     Objective:   Vitals:   height is 65\" (165.1 cm) and weight is 151 lb 10.8 oz (68.8 kg). Her tympanic temperature is 97.9 " °F (36.6 °C). Her blood pressure is 159/82 and her pulse is 70. Her respiration is 18 and oxygen saturation is 91%.  No intake or output data in the 24 hours ending 10/10/17 0841      Physical Exam:  General Appearance:    Alert, cooperative, in no acute distress   Head:    Normocephalic, without obvious abnormality, atraumatic   Eyes:            Lids and lashes normal, conjunctivae and sclerae normal, no   icterus, no pallor, corneas clear, PERRLA. + Glasses.   Ears:    Ears appear intact with no abnormalities noted   Throat:   No oral lesions, no thrush, oral mucosa moist   Neck:   No adenopathy, supple, trachea midline, no thyromegaly, no     carotid bruit, no JVD   Back:     No kyphosis present, no scoliosis present, no skin lesions,       erythema or scars, no tenderness to percussion or                   palpation,   range of motion normal   Lungs:     Clear to auscultation,respirations regular, even and                   unlabored    Heart:    Regular rhythm and normal rate, normal S1 and S2, no            murmur, no gallop, no rub, no click       Abdomen:     Normal bowel sounds, no masses, no organomegaly, soft        non-tender, non-distended, no guarding, no rebound                 tenderness       Extremities:   Moves all extremities well,  no cyanosis, no redness, no edema   Pulses:   Pulses palpable and equal bilaterally   Skin:   No bleeding, bruising or rash   Lymph nodes:   No palpable adenopathy   Neurologic:   Cranial nerves 2 - 12 grossly intact, sensation intact, DTR        present and equal bilaterally          Results Review:  I personally reviewed the patient's clinical results.    Results from last 7 days  Lab Units 10/09/17  1140   WBC 10*3/mm3 11.12*   HEMOGLOBIN g/dL 14.3   HEMATOCRIT % 44.7*   PLATELETS 10*3/mm3 168       Results from last 7 days  Lab Units 10/09/17  1140   SODIUM mmol/L 140   POTASSIUM mmol/L 4.1   CHLORIDE mmol/L 105   CO2 mmol/L 32.0*   BUN mg/dL 14   CREATININE mg/dL  1.00   CALCIUM mg/dL 9.8   GLUCOSE mg/dL 111*         Results from last 7 days  Lab Units 10/09/17  1140   INR  1.01                   Radiology:  Imaging Results (last 72 hours)     ** No results found for the last 72 hours. **          Tele:  Paced    Assessment and Plan:    1.  Pacemaker at [facility]   -  Generator change today.  Risks and benefits reviewed. Patient wishes to proceed.   -  FU in 7-10 days for wound check.  2.  CAD   -  Stable and currently asymptomatic.   -  Continue Plavix and Statin  3.  Essential HTN   -  Well controlled.  Continue current medical regimen.    I discussed the patients findings and my recommendations with the patient, any present family members, and the nursing staff.  Ricardo Cross MD saw and examined patient, verified hx and PE, read all radiographic studies, reviewed labs and micro data, and formulated dx, plan for treatment and all medical decision making.      Whitney Castellanos PA-C for Ricardo Cross MD  10/10/17 8:41 AM      EMR Dragon/Transcription disclaimer:   Much of this encounter note is an electronic transcription/translation of spoken language to printed text. The electronic translation of spoken language may permit erroneous, or at times, nonsensical words or phrases to be inadvertently transcribed; Although I have reviewed the note for such errors, some may still exist.          I, Ricardo Cross MD, personally performed the services face to face as described and documented by the above named individual. I have made any necessary edits and it is both accurate and complete 10/10/2017  9:50 AM

## 2017-10-17 ENCOUNTER — OFFICE VISIT (OUTPATIENT)
Dept: CARDIOLOGY | Facility: CLINIC | Age: 82
End: 2017-10-17

## 2017-10-17 DIAGNOSIS — R00.1 BRADYCARDIA: Primary | ICD-10-CM

## 2017-10-17 PROCEDURE — 99024 POSTOP FOLLOW-UP VISIT: CPT | Performed by: INTERNAL MEDICINE

## 2017-10-17 NOTE — PROGRESS NOTES
WOUND CHECK    10/17/2017    Chary Marquezfabdesiree, : 1933    WOUND CHECK    B/P: /80 (Sitting)    Pulse: 69    Patient has fever: [x] Temperature if indicated: 98.3     Wound Location: LEFT INFRACLAVICULAR    Dressing Removed [x]        Old Dressing Appearance:  Clean, dry [x]                 Old, bloody drainage [x]  MINIMUM                          Moist, serous drainage []                Moist, thick yellow/green drainage []       Wound Appearance: Redness []                  Drainage []                  Culture obtained []        Color:      Consistency:      Amount:          Gloves used, wound cleansed with sterile 4x4 and peroxide [x]       MD notified [] MD orders:     Antibiotic started []  If checked, type   Other:     Appointment for follow-up scheduled for 3 months post procedure [x]    Future Appointments  Date Time Provider Department Center   2018 9:45 AM Ricardo Cross MD [facility] None           Arabella Yoo MA, 10/17/17      MD Signature:______________________________ Completed By/Date:

## 2017-10-27 ENCOUNTER — DOCUMENTATION (OUTPATIENT)
Dept: CARDIOLOGY | Facility: CLINIC | Age: 82
End: 2017-10-27

## 2017-11-28 ENCOUNTER — TELEPHONE (OUTPATIENT)
Dept: CARDIOLOGY | Facility: CLINIC | Age: 82
End: 2017-11-28

## 2017-11-28 NOTE — TELEPHONE ENCOUNTER
Called pt to see if she has plugged her Merlin box up.  She has not and she is going to get her daughter to help her.   Gave her my name and number if she has any questions.

## 2017-12-08 ENCOUNTER — CLINICAL SUPPORT NO REQUIREMENTS (OUTPATIENT)
Dept: CARDIOLOGY | Facility: CLINIC | Age: 82
End: 2017-12-08

## 2017-12-08 DIAGNOSIS — I48.0 PAROXYSMAL ATRIAL FIBRILLATION (HCC): ICD-10-CM

## 2017-12-08 DIAGNOSIS — R00.1 BRADYCARDIA: Primary | ICD-10-CM

## 2017-12-13 ENCOUNTER — TELEPHONE (OUTPATIENT)
Dept: CARDIOLOGY | Facility: CLINIC | Age: 82
End: 2017-12-13

## 2017-12-29 NOTE — TELEPHONE ENCOUNTER
Per Dr. Cross, I called and let the patient know that she needs to start Lopressor 25 mg BID. I sent the RX to [facility] in [place].

## 2018-01-16 ENCOUNTER — TELEPHONE (OUTPATIENT)
Dept: CARDIOLOGY | Facility: CLINIC | Age: 83
End: 2018-01-16

## 2018-01-16 NOTE — TELEPHONE ENCOUNTER
Daughter (Zelda) called they did not start metoprolol due to illness. Her bp has been normal and her HR has been in the 60's. She was dehydrated when the last interrogation was performed and the daughter wants to know if she needs the med. Told the daughter to send a another download and transferred her to Ant Monet RN.

## 2018-01-16 NOTE — TELEPHONE ENCOUNTER
Spoke w/ dtr.  She will have to do a manual transmission in order for me to read it.  She is not at Ms. Boucher's house. There is 4 inches of snow on the ground and it is treacherous.  She plans on going over there tomorrow and sending the transmission.  The last transmission on the 6th showed presenting rhythm of ApVs @ 60.  The alerts for afib are on and she has not had any alerts come across.

## 2018-01-31 ENCOUNTER — OFFICE VISIT (OUTPATIENT)
Dept: CARDIOLOGY | Facility: CLINIC | Age: 83
End: 2018-01-31

## 2018-01-31 VITALS
HEIGHT: 65 IN | BODY MASS INDEX: 24.83 KG/M2 | WEIGHT: 149 LBS | DIASTOLIC BLOOD PRESSURE: 76 MMHG | SYSTOLIC BLOOD PRESSURE: 146 MMHG | HEART RATE: 67 BPM

## 2018-01-31 DIAGNOSIS — I25.10 CORONARY ARTERY DISEASE INVOLVING NATIVE CORONARY ARTERY OF NATIVE HEART WITHOUT ANGINA PECTORIS: ICD-10-CM

## 2018-01-31 DIAGNOSIS — I10 ESSENTIAL HYPERTENSION: ICD-10-CM

## 2018-01-31 DIAGNOSIS — I48.0 PAROXYSMAL ATRIAL FIBRILLATION (HCC): Primary | ICD-10-CM

## 2018-01-31 DIAGNOSIS — R00.1 BRADYCARDIA: ICD-10-CM

## 2018-01-31 PROCEDURE — 99213 OFFICE O/P EST LOW 20 MIN: CPT | Performed by: INTERNAL MEDICINE

## 2018-01-31 PROCEDURE — 93280 PM DEVICE PROGR EVAL DUAL: CPT | Performed by: INTERNAL MEDICINE

## 2018-01-31 RX ORDER — LOSARTAN POTASSIUM 25 MG/1
25 TABLET ORAL DAILY
COMMUNITY
End: 2018-01-31 | Stop reason: SDUPTHER

## 2018-01-31 RX ORDER — BUSPIRONE HYDROCHLORIDE 5 MG/1
5 TABLET ORAL 2 TIMES DAILY
COMMUNITY

## 2018-01-31 RX ORDER — LOSARTAN POTASSIUM 50 MG/1
50 TABLET ORAL DAILY
Qty: 90 TABLET | Refills: 3 | Status: SHIPPED | OUTPATIENT
Start: 2018-01-31 | End: 2020-01-01

## 2018-01-31 NOTE — PROGRESS NOTES
"Chary Boucher  11/18/1933  [phone]      01/31/2018    [facility] CARDIOLOGY     Saige Tobias MD  [address] [place] [address]    Chief Complaint   Patient presents with   • Atrial Fibrillation       Problem List:   1. Coronary disease:  a. Left heart catheterization 2002, multi-vessel coronary disease/medical therapy.  b. Stress Cardiolite 2004, no ischemia.  c. Chest pain/left heart catheterization, December 2008, Dr. Mendoza revealing significant LV stenosis treated with drug eluting stent. Normal LV function/”spasm” right coronary artery (catheter induced).  d. Stress Cardiolite, 07/27/2010: Mild anterior ischemia with questionable artifact. Normal LV size and function.   e. Lexiscan Cardiolite, 03/25/2015 showing left ventricular ejection fraction 75%, no ischemia.   f. [facility] Dr Araujo with PTCA/stenting of LAD and LCFX with ESTEPHANIA 4/2017  2. Peripheral vascular disease:  a. Severe right common iliac stenosis.  b. Abdominal aortic aneurysm measuring 2 cm in March 2008.  c. Status post successful PTCA and placement of stent, December 2008.  d. Status-post PTCA and stenting of the right ICA, 09/25/2008.   3. Atrial fibrillation:  a. Tikosyn therapy 10/25/2007.  b. Medtronic dual chamber pacemaker.  c. Generator change ([facility]) 10/10/17  4. Essential Hypertension.  5. Dyslipidemia.  6. Degenerative joint disease/arthritis.  7. GERD.  8. Osteoporosis.  9. Abdominal aortic aneurysm:  a. Ultrasound of the abdomen on 03/18/2015 with size measured at 2.3 centimeters.    Allergies  Allergies   Allergen Reactions   • Adhesive Tape    • Celebrex [Celecoxib]      \"GI PAIN\"   • Epinephrine      \"SHAKES ALL OVER\"   • Keflex [Cephalexin]      \"UNKNOWN\"   • Niaspan [Niacin Er]      \"UNKNOWN\"   • Pacerone [Amiodarone] Hallucinations       Current Medications    Current Outpatient Prescriptions:   •  apixaban (ELIQUIS) 2.5 MG tablet tablet, Take 2.5 mg by mouth 2 (Two) Times " a Day., Disp: , Rfl:   •  atorvastatin (LIPITOR) 40 MG tablet, Take 40 mg by mouth Every Night., Disp: , Rfl:   •  busPIRone (BUSPAR) 5 MG tablet, Take 5 mg by mouth 2 (Two) Times a Day., Disp: , Rfl:   •  Calcium Carb-Cholecalciferol (CALCIUM 600 + D PO), Take 1 tablet by mouth Every Night., Disp: , Rfl:   •  CloNIDine (CATAPRES) 0.1 MG tablet, Take 0.1 mg by mouth Daily As Needed for High Blood Pressure (for SBP>170)., Disp: , Rfl:   •  clopidogrel (PLAVIX) 75 MG tablet, Take 1 tablet by mouth Daily. (Patient taking differently: Take 75 mg by mouth Every Morning.), Disp: 30 tablet, Rfl: 11  •  dofetilide (TIKOSYN) 125 MCG capsule, Take 1 capsule by mouth 2 (Two) Times a Day., Disp: 180 capsule, Rfl: 2  •  donepezil (ARICEPT) 5 MG tablet, Take 5 mg by mouth Every Morning., Disp: , Rfl:   •  escitalopram (LEXAPRO) 10 MG tablet, Take 10 mg by mouth Every Morning., Disp: , Rfl:   •  isosorbide mononitrate (IMDUR) 30 MG 24 hr tablet, Take 30 mg by mouth Every Morning., Disp: , Rfl:   •  losartan (COZAAR) 25 MG tablet, Take 25 mg by mouth Daily., Disp: , Rfl:   •  methylcellulose, Laxative, (CITRUCEL) 500 MG tablet tablet, Take 2 tablets by mouth Daily., Disp: , Rfl:   •  nitroglycerin (NITROSTAT) 0.4 MG SL tablet, Place 0.4 mg under the tongue Every 5 (Five) Minutes As Needed for Chest Pain. Take no more than 3 doses in 15 minutes., Disp: , Rfl:   •  omeprazole (PriLOSEC) 20 MG capsule, Take 20 mg by mouth Every Morning., Disp: , Rfl:   •  Probiotic Product (eShares) capsule, Take 1 capsule by mouth Every Morning., Disp: , Rfl:   •  PROPRANOLOL HCL PO, Take 80 mg by mouth 2 (Two) Times a Day., Disp: , Rfl:   •  Salmeterol Xinafoate (SEREVENT DISKUS IN), Inhale., Disp: , Rfl:   •  tiotropium (SPIRIVA) 18 MCG per inhalation capsule, Place 1 capsule into inhaler and inhale Daily., Disp: , Rfl:   •  traMADol (ULTRAM) 50 MG tablet, Take  by mouth 3 (Three) Times a Day. TAKES 100 MG PO QAM, 100 MG PO AT 1500,  "AND 1 TAB PO QHS, Disp: , Rfl:   •  enalapril (VASOTEC) 10 MG tablet, Take 5 mg by mouth Every Morning., Disp: , Rfl:   •  Fluticasone-Salmeterol (ADVAIR HFA IN), Inhale 1 puff Daily., Disp: , Rfl:   •  metoprolol tartrate (LOPRESSOR) 25 MG tablet, Take 1 tablet by mouth Every 12 (Twelve) Hours., Disp: 60 tablet, Rfl: 6    History of Present Illness   HPI    Pt presents for follow up of atrial fibrillation, bradycardia, and PM check. Since we last saw the pt, she had her generator changed in October. Pt denies any awareness of AF episodes, no changes in chronic SOB. She is on chronic oxygen for the past year or two. No  CP, LH, and dizziness. Denies any hospitalizations, bleeding on Eliquis, or TIA/CVA symptoms. Overall feels well except for fatigue chronic. Her Enalapril was recently changed to Losartan 25 mg daily, since then her BPs have been running high up to the 170s.     ROS:  General:  + fatigue, No weight gain or loss  Cardiovascular:  Denies CP, PND, syncope, near syncope, edema or palpitations.  Pulmonary:  + chronic DENNIS, cough, or wheezing      Vitals:    01/31/18 0940   BP: 146/76   BP Location: Right arm   Patient Position: Sitting   Pulse: 67   Weight: 67.6 kg (149 lb)   Height: 165.1 cm (65\")     Body mass index is 24.79 kg/(m^2).  PE:  General: NAD  Neck: no JVD, no carotid bruits, no TM  Heart RRR, NL S1, S2, S4 present, no rubs, murmurs  Lungs: CTA, no wheezes, rhonchi, or rales  Abd: soft, non-tender, NL BS  Ext: No musculoskeletal deformities, no edema, cyanosis, or clubbing  Psych: normal mood and affect  Skin: PM site well healed, no sign of infection.     Diagnostic Data:      PM check: normal function. 5.6 % atrial fibrillation. Increased RA sensitivity to 0.3 mV for AMS. 9-9.7 years on battery.     Procedures    1. Paroxysmal atrial fibrillation    2. Bradycardia    3. Essential hypertension    4. Coronary artery disease involving native coronary artery of native heart without angina " pectoris          Plan:  1) PAF- overall well controlled on Tikosyn.   Continue present medications.   2) Anticoagulation: CHADSvasc = 5  Continue Eliquis  3) Bradycardia - normal PM function, with recent generator change  4) HTN- uncontrolled. Will increase Losartan 50 mg po daily  Wt loss, exercise, salt reduction  5) CAD - s/p stent April 2017. On Plavix.       F/up in 6 months    Scribed for Ricardo Cross MD by Bridget Kerr PA-C. 1/31/2018  10:23 AM     I, Ricardo Cross MD, personally performed the services described in this documentation as scribed by the above named individual in my presence, and it is both accurate and complete.  1/31/2018  10:23 AM

## 2018-02-21 ENCOUNTER — TELEPHONE (OUTPATIENT)
Dept: CARDIOLOGY | Facility: CLINIC | Age: 83
End: 2018-02-21

## 2018-02-21 NOTE — TELEPHONE ENCOUNTER
Called pt and let her know her Amanda arrived at the office today. She requested we bring to [facility] on 2/22/2018.

## 2018-05-15 ENCOUNTER — CLINICAL SUPPORT NO REQUIREMENTS (OUTPATIENT)
Dept: CARDIOLOGY | Facility: CLINIC | Age: 83
End: 2018-05-15

## 2018-05-15 DIAGNOSIS — I48.0 PAROXYSMAL ATRIAL FIBRILLATION (HCC): ICD-10-CM

## 2018-05-15 DIAGNOSIS — R00.1 BRADYCARDIA: ICD-10-CM

## 2018-05-15 PROCEDURE — 93296 REM INTERROG EVL PM/IDS: CPT | Performed by: INTERNAL MEDICINE

## 2018-05-15 PROCEDURE — 93294 REM INTERROG EVL PM/LDLS PM: CPT | Performed by: INTERNAL MEDICINE

## 2018-06-26 ENCOUNTER — TELEPHONE (OUTPATIENT)
Dept: CARDIOLOGY | Facility: CLINIC | Age: 83
End: 2018-06-26

## 2018-06-29 ENCOUNTER — TELEPHONE (OUTPATIENT)
Dept: CARDIOLOGY | Facility: CLINIC | Age: 83
End: 2018-06-29

## 2018-06-29 NOTE — TELEPHONE ENCOUNTER
Patient notified and aware that her Tikosyn is here at the office ready to be picked up. I put it in the EP drawer.

## 2018-07-06 ENCOUNTER — TELEPHONE (OUTPATIENT)
Dept: CARDIOLOGY | Facility: CLINIC | Age: 83
End: 2018-07-06

## 2018-07-06 NOTE — TELEPHONE ENCOUNTER
Pt was in [facility] on Saturday and again last night.  On antibiotics and doing much better.  Also using nebulizer.  She thinks she may still be in afib but she can't always tell.  Will call if problems.

## 2018-08-15 ENCOUNTER — CLINICAL SUPPORT NO REQUIREMENTS (OUTPATIENT)
Dept: CARDIOLOGY | Facility: CLINIC | Age: 83
End: 2018-08-15

## 2018-08-15 DIAGNOSIS — I48.91 ATRIAL FIBRILLATION, UNSPECIFIED TYPE (HCC): ICD-10-CM

## 2018-09-21 ENCOUNTER — TELEPHONE (OUTPATIENT)
Dept: CARDIOLOGY | Facility: CLINIC | Age: 83
End: 2018-09-21

## 2018-09-27 ENCOUNTER — TELEPHONE (OUTPATIENT)
Dept: CARDIOLOGY | Facility: CLINIC | Age: 83
End: 2018-09-27

## 2018-10-03 ENCOUNTER — TELEPHONE (OUTPATIENT)
Dept: CARDIOLOGY | Facility: CLINIC | Age: 83
End: 2018-10-03

## 2018-10-03 NOTE — TELEPHONE ENCOUNTER
Patient's daughter called, patients blood pressure has been running consistently high over the last 2 weeks, her systolic's have been running 160-180, and diastolic's 80-90. Yesterdays readings were: 187/93 in the morning and 162/86 in the afternoon, patients daughter wanted to know if they needed to do anything prior to her appointment on 10/17/18.

## 2018-10-17 ENCOUNTER — OFFICE VISIT (OUTPATIENT)
Dept: CARDIOLOGY | Facility: CLINIC | Age: 83
End: 2018-10-17

## 2018-10-17 VITALS
BODY MASS INDEX: 25.99 KG/M2 | HEART RATE: 64 BPM | SYSTOLIC BLOOD PRESSURE: 130 MMHG | WEIGHT: 156 LBS | HEIGHT: 65 IN | DIASTOLIC BLOOD PRESSURE: 88 MMHG

## 2018-10-17 DIAGNOSIS — I10 ESSENTIAL HYPERTENSION: ICD-10-CM

## 2018-10-17 DIAGNOSIS — R00.1 BRADYCARDIA: ICD-10-CM

## 2018-10-17 DIAGNOSIS — I48.0 PAROXYSMAL ATRIAL FIBRILLATION (HCC): Primary | ICD-10-CM

## 2018-10-17 DIAGNOSIS — I25.10 CORONARY ARTERY DISEASE INVOLVING NATIVE CORONARY ARTERY OF NATIVE HEART WITHOUT ANGINA PECTORIS: ICD-10-CM

## 2018-10-17 PROCEDURE — 93280 PM DEVICE PROGR EVAL DUAL: CPT | Performed by: NURSE PRACTITIONER

## 2018-10-17 PROCEDURE — 99213 OFFICE O/P EST LOW 20 MIN: CPT | Performed by: NURSE PRACTITIONER

## 2018-10-17 RX ORDER — PROPRANOLOL HYDROCHLORIDE 80 MG/1
80 CAPSULE, EXTENDED RELEASE ORAL
Qty: 30 CAPSULE | Refills: 6 | Status: SHIPPED | OUTPATIENT
Start: 2018-10-17 | End: 2019-02-14 | Stop reason: SDUPTHER

## 2018-10-17 RX ORDER — PROPRANOLOL HYDROCHLORIDE 160 MG/1
160 CAPSULE, EXTENDED RELEASE ORAL
COMMUNITY
End: 2018-10-17 | Stop reason: SDUPTHER

## 2018-10-17 NOTE — PROGRESS NOTES
"Chary Boucher  11/18/1933    There is no work phone number on file.      10/17/2018    [facility] CARDIOLOGY     Saige oTbias MD  [address] [place] [address]    Chief Complaint   Patient presents with   • Atrial Fibrillation       Problem List:   1. Coronary disease:  a. Left heart catheterization 2002, multi-vessel coronary disease/medical therapy.  b. Stress Cardiolite 2004, no ischemia.  c. Chest pain/left heart catheterization, December 2008, Dr. Mendoza revealing significant LV stenosis treated with drug eluting stent. Normal LV function/”spasm” right coronary artery (catheter induced).  d. Stress Cardiolite, 07/27/2010: Mild anterior ischemia with questionable artifact. Normal LV size and function.   e. Lexiscan Cardiolite, 03/25/2015 showing left ventricular ejection fraction 75%, no ischemia.   f. [facility] Dr Araujo with PTCA/stenting of LAD and LCFX with ESTEPHANIA 4/2017  2. Peripheral vascular disease:  a. Severe right common iliac stenosis.  b. Abdominal aortic aneurysm measuring 2 cm in March 2008.  c. Status post successful PTCA and placement of stent, December 2008.  d. Status-post PTCA and stenting of the right ICA, 09/25/2008.   3. Atrial fibrillation:  a. Tikosyn therapy 10/25/2007.  b. Medtronic dual chamber pacemaker.  c. Generator change ([employer]) 10/10/17  4. Essential Hypertension.  5. Dyslipidemia.  6. Degenerative joint disease/arthritis.  7. GERD.  8. Osteoporosis.  9. Abdominal aortic aneurysm:  a. Ultrasound of the abdomen on 03/18/2015 with size measured at 2.3 centimeters.    Allergies  Allergies   Allergen Reactions   • Adhesive Tape    • Celebrex [Celecoxib]      \"GI PAIN\"   • Epinephrine      \"SHAKES ALL OVER\"   • Keflex [Cephalexin]      \"UNKNOWN\"   • Niaspan [Niacin Er]      \"UNKNOWN\"   • Pacerone [Amiodarone] Hallucinations       Current Medications    Current Outpatient Prescriptions:   •  apixaban (ELIQUIS) 2.5 MG tablet tablet, Take " 2.5 mg by mouth 2 (Two) Times a Day., Disp: , Rfl:   •  atorvastatin (LIPITOR) 40 MG tablet, Take 40 mg by mouth Every Night., Disp: , Rfl:   •  busPIRone (BUSPAR) 5 MG tablet, Take 5 mg by mouth 2 (Two) Times a Day., Disp: , Rfl:   •  Calcium Carb-Cholecalciferol (CALCIUM 600 + D PO), Take 1 tablet by mouth Every Night., Disp: , Rfl:   •  CloNIDine (CATAPRES) 0.1 MG tablet, Take 0.1 mg by mouth Daily As Needed for High Blood Pressure (for SBP>170)., Disp: , Rfl:   •  dofetilide (TIKOSYN) 125 MCG capsule, Take 1 capsule by mouth 2 (Two) Times a Day., Disp: 180 capsule, Rfl: 2  •  donepezil (ARICEPT) 5 MG tablet, Take 5 mg by mouth Every Morning., Disp: , Rfl:   •  escitalopram (LEXAPRO) 10 MG tablet, Take 10 mg by mouth Every Morning., Disp: , Rfl:   •  Fluticasone-Salmeterol (ADVAIR HFA IN), Inhale 1 puff Daily., Disp: , Rfl:   •  isosorbide mononitrate (IMDUR) 30 MG 24 hr tablet, Take 30 mg by mouth Every Morning., Disp: , Rfl:   •  losartan (COZAAR) 50 MG tablet, Take 1 tablet by mouth Daily. (Patient taking differently: Take 100 mg by mouth Daily.), Disp: 90 tablet, Rfl: 3  •  methylcellulose, Laxative, (CITRUCEL) 500 MG tablet tablet, Take 2 tablets by mouth Daily., Disp: , Rfl:   •  nitroglycerin (NITROSTAT) 0.4 MG SL tablet, Place 0.4 mg under the tongue Every 5 (Five) Minutes As Needed for Chest Pain. Take no more than 3 doses in 15 minutes., Disp: , Rfl:   •  omeprazole (PriLOSEC) 20 MG capsule, Take 20 mg by mouth Every Morning., Disp: , Rfl:   •  Probiotic Product (Extended Systems) capsule, Take 1 capsule by mouth Every Morning., Disp: , Rfl:   •  propranolol LA (INDERAL LA) 160 MG 24 hr capsule, Take 160 mg by mouth Daily., Disp: , Rfl:   •  Salmeterol Xinafoate (SEREVENT DISKUS IN), Inhale., Disp: , Rfl:   •  tiotropium (SPIRIVA) 18 MCG per inhalation capsule, Place 1 capsule into inhaler and inhale Daily., Disp: , Rfl:   •  traMADol (ULTRAM) 50 MG tablet, Take  by mouth 3 (Three) Times a Day.  "TAKES 100 MG PO QAM, 100 MG PO AT 1500, AND 1 TAB PO QHS, Disp: , Rfl:     History of Present Illness   HPI    Pt presents for follow up of atrial fibrillation on Tikosyn, HTN, PM check. Her biggest complaint is fatigue and shortness of breath.  Also with tremors that she feels have gotten worse.  Scheduled to see neurology in December to discuss other drug options.  She is on 2L O2 NC continuously.  Recently has been started on nebulizers and feels these have helped some.  Since we last saw the pt, losartan dose was increased due to elevated BP's at home.  Now more stable over the last few days per home BP readings.  She denies any AF episodes, CP, LH, and dizziness. Denies any hospitalizations, ER visits, bleeding, or TIA/CVA symptoms. Overall feels fatigued.    ROS:  General:  + fatigue, no weight gain or loss  Cardiovascular:  Denies CP, PND, syncope, near syncope, edema or palpitations.  Pulmonary:  + DENNIS, no cough, or wheezing      Vitals:    10/17/18 1400   BP: 130/88   BP Location: Right arm   Patient Position: Sitting   Pulse: 64   Weight: 70.8 kg (156 lb)   Height: 165.1 cm (65\")     PE:  General: Frail, elderly female, NAD  Neck: no JVD, no carotid bruits, no TM  Heart RRR, NL S1, S2, S4 present, no rubs, murmurs  Lungs: CTA, no wheezes, rhonchi, or rales, on 3L O2 via NC  Abd: soft, non-tender, NL BS  Ext: No musculoskeletal deformities, no edema, cyanosis, or clubbing  Psych: normal mood and affect    Diagnostic Data:  Device interrogation: [employer] PPM with normal function, 77% RA paced, <1% RV paced, 9 years battery life, 4% AF, PVARP increased to prevent pmt      ECG 12 Lead  Date/Time: 10/17/2018 2:14 PM  Performed by: FRANNY BOWMAN  Authorized by: FRANNY BOWMAN   Comparison: compared with previous ECG from 4/26/2017  Similar to previous ECG  Rhythm: paced  BPM: 64              1. Paroxysmal atrial fibrillation (CMS/HCC)    2. Bradycardia    3. Essential hypertension    4. " Coronary artery disease involving native coronary artery of native heart without angina pectoris          Plan:  1) Paroxysmal atrial fibrillation:  - On Tikosyn, overall well controlled on meds.  Wishing to come off of propranolol due to possible side effects of fatigue and no improvement with her tremors.  Will decrease to 80 mg daily and see if she notices any improvement in her symptoms.    2) Anticoagulation:  - Continue Eliquis  3) Bradycardia:  - S/p PPM with gen change last year, device with normal function  4) HTN:  - Improved after increase in losartan dosing to 100 mg daily.  Continue to monitor on lower dose of propranolol.  - Wt loss, salt reduction  5) CAD:  - S/p stent 04/2017, no anginal complaints    F/up in 6 months    [facility]  10/17/2018  2:55 PM

## 2018-12-18 ENCOUNTER — CLINICAL SUPPORT NO REQUIREMENTS (OUTPATIENT)
Dept: CARDIOLOGY | Facility: CLINIC | Age: 83
End: 2018-12-18

## 2018-12-18 DIAGNOSIS — I48.0 PAROXYSMAL ATRIAL FIBRILLATION (HCC): ICD-10-CM

## 2018-12-18 DIAGNOSIS — R00.1 BRADYCARDIA: ICD-10-CM

## 2018-12-18 PROCEDURE — 93294 REM INTERROG EVL PM/LDLS PM: CPT | Performed by: INTERNAL MEDICINE

## 2018-12-18 PROCEDURE — 93296 REM INTERROG EVL PM/IDS: CPT | Performed by: INTERNAL MEDICINE

## 2018-12-26 ENCOUNTER — TELEPHONE (OUTPATIENT)
Dept: CARDIOLOGY | Facility: CLINIC | Age: 83
End: 2018-12-26

## 2018-12-26 NOTE — TELEPHONE ENCOUNTER
Refill Information  First Name:   EMILIANO   Last Name:   STEVO   Shipping Address:   [address] [place] [address] [address]   [place] [address]   Prescriber Name:   AMANDA DANYELLE   Prescriber State License:   [id number]   Prescriber MEETA#   [id number]   Prescriber NPI#   [id number]   Recent Order Date:   09/21/2018   Prescription Details  Product Name Form Strength Quantity Dosing Instructions Refill Remaining Ship To   Tikosyn® (dofetilide) Capsule 0.125 mg - 60 Pckg 180 Use As Directed 3 Prescriber         I re-ordered Tikosyn through [facility] portal.

## 2019-01-03 ENCOUNTER — TELEPHONE (OUTPATIENT)
Dept: CARDIOLOGY | Facility: CLINIC | Age: 84
End: 2019-01-03

## 2019-01-17 ENCOUNTER — APPOINTMENT (OUTPATIENT)
Dept: CT IMAGING | Facility: HOSPITAL | Age: 84
End: 2019-01-17

## 2019-01-17 ENCOUNTER — HOSPITAL ENCOUNTER (EMERGENCY)
Facility: HOSPITAL | Age: 84
Discharge: HOME | End: 2019-01-17
Attending: EMERGENCY MEDICINE | Admitting: EMERGENCY MEDICINE

## 2019-01-17 ENCOUNTER — APPOINTMENT (OUTPATIENT)
Dept: GENERAL RADIOLOGY | Facility: HOSPITAL | Age: 84
End: 2019-01-17

## 2019-01-17 VITALS
TEMPERATURE: 98.5 F | HEART RATE: 65 BPM | OXYGEN SATURATION: 92 % | BODY MASS INDEX: 24.99 KG/M2 | DIASTOLIC BLOOD PRESSURE: 95 MMHG | RESPIRATION RATE: 24 BRPM | WEIGHT: 150 LBS | SYSTOLIC BLOOD PRESSURE: 151 MMHG | HEIGHT: 65 IN

## 2019-01-17 DIAGNOSIS — R06.02 SHORTNESS OF BREATH: Primary | ICD-10-CM

## 2019-01-17 DIAGNOSIS — J44.1 COPD EXACERBATION (HCC): ICD-10-CM

## 2019-01-17 DIAGNOSIS — J40 BRONCHITIS: ICD-10-CM

## 2019-01-17 DIAGNOSIS — Z79.01 CHRONIC ANTICOAGULATION: ICD-10-CM

## 2019-01-17 LAB
ALBUMIN SERPL-MCNC: 4.73 G/DL (ref 3.2–4.8)
ALBUMIN/GLOB SERPL: 1.6 G/DL (ref 1.5–2.5)
ALP SERPL-CCNC: 96 U/L (ref 25–100)
ALT SERPL W P-5'-P-CCNC: 13 U/L (ref 7–40)
ANION GAP SERPL CALCULATED.3IONS-SCNC: 8 MMOL/L (ref 3–11)
AST SERPL-CCNC: 18 U/L (ref 0–33)
BASOPHILS # BLD AUTO: 0.02 10*3/MM3 (ref 0–0.2)
BASOPHILS NFR BLD AUTO: 0.2 % (ref 0–1)
BILIRUB SERPL-MCNC: 1.4 MG/DL (ref 0.3–1.2)
BNP SERPL-MCNC: 126 PG/ML (ref 0–100)
BUN BLD-MCNC: 20 MG/DL (ref 9–23)
BUN/CREAT SERPL: 17.4 (ref 7–25)
CALCIUM SPEC-SCNC: 10 MG/DL (ref 8.7–10.4)
CHLORIDE SERPL-SCNC: 100 MMOL/L (ref 99–109)
CO2 SERPL-SCNC: 30 MMOL/L (ref 20–31)
CREAT BLD-MCNC: 1.15 MG/DL (ref 0.6–1.3)
DEPRECATED RDW RBC AUTO: 49 FL (ref 37–54)
EOSINOPHIL # BLD AUTO: 0.34 10*3/MM3 (ref 0–0.3)
EOSINOPHIL NFR BLD AUTO: 2.7 % (ref 0–3)
ERYTHROCYTE [DISTWIDTH] IN BLOOD BY AUTOMATED COUNT: 14.4 % (ref 11.3–14.5)
GFR SERPL CREATININE-BSD FRML MDRD: 45 ML/MIN/1.73
GLOBULIN UR ELPH-MCNC: 2.9 GM/DL
GLUCOSE BLD-MCNC: 112 MG/DL (ref 70–100)
HCT VFR BLD AUTO: 48.7 % (ref 34.5–44)
HGB BLD-MCNC: 15.9 G/DL (ref 11.5–15.5)
HOLD SPECIMEN: NORMAL
HOLD SPECIMEN: NORMAL
IMM GRANULOCYTES # BLD AUTO: 0.05 10*3/MM3 (ref 0–0.03)
IMM GRANULOCYTES NFR BLD AUTO: 0.4 % (ref 0–0.6)
LYMPHOCYTES # BLD AUTO: 3.67 10*3/MM3 (ref 0.6–4.8)
LYMPHOCYTES NFR BLD AUTO: 28.7 % (ref 24–44)
MAGNESIUM SERPL-MCNC: 1.9 MG/DL (ref 1.3–2.7)
MCH RBC QN AUTO: 30.3 PG (ref 27–31)
MCHC RBC AUTO-ENTMCNC: 32.6 G/DL (ref 32–36)
MCV RBC AUTO: 92.9 FL (ref 80–99)
MONOCYTES # BLD AUTO: 1.33 10*3/MM3 (ref 0–1)
MONOCYTES NFR BLD AUTO: 10.4 % (ref 0–12)
NEUTROPHILS # BLD AUTO: 7.38 10*3/MM3 (ref 1.5–8.3)
NEUTROPHILS NFR BLD AUTO: 57.6 % (ref 41–71)
PLATELET # BLD AUTO: 201 10*3/MM3 (ref 150–450)
PMV BLD AUTO: 10.8 FL (ref 6–12)
POTASSIUM BLD-SCNC: 4 MMOL/L (ref 3.5–5.5)
PROT SERPL-MCNC: 7.6 G/DL (ref 5.7–8.2)
RBC # BLD AUTO: 5.24 10*6/MM3 (ref 3.89–5.14)
SODIUM BLD-SCNC: 138 MMOL/L (ref 132–146)
TROPONIN I SERPL-MCNC: 0 NG/ML (ref 0–0.07)
TROPONIN I SERPL-MCNC: 0 NG/ML (ref 0–0.07)
WBC NRBC COR # BLD: 12.79 10*3/MM3 (ref 3.5–10.8)
WHOLE BLOOD HOLD SPECIMEN: NORMAL
WHOLE BLOOD HOLD SPECIMEN: NORMAL

## 2019-01-17 PROCEDURE — 99284 EMERGENCY DEPT VISIT MOD MDM: CPT

## 2019-01-17 PROCEDURE — 93005 ELECTROCARDIOGRAM TRACING: CPT

## 2019-01-17 PROCEDURE — 96374 THER/PROPH/DIAG INJ IV PUSH: CPT

## 2019-01-17 PROCEDURE — 0 IOPAMIDOL PER 1 ML: Performed by: EMERGENCY MEDICINE

## 2019-01-17 PROCEDURE — 71275 CT ANGIOGRAPHY CHEST: CPT

## 2019-01-17 PROCEDURE — 83880 ASSAY OF NATRIURETIC PEPTIDE: CPT | Performed by: EMERGENCY MEDICINE

## 2019-01-17 PROCEDURE — 94799 UNLISTED PULMONARY SVC/PX: CPT

## 2019-01-17 PROCEDURE — 93005 ELECTROCARDIOGRAM TRACING: CPT | Performed by: EMERGENCY MEDICINE

## 2019-01-17 PROCEDURE — 83735 ASSAY OF MAGNESIUM: CPT | Performed by: EMERGENCY MEDICINE

## 2019-01-17 PROCEDURE — 80053 COMPREHEN METABOLIC PANEL: CPT | Performed by: EMERGENCY MEDICINE

## 2019-01-17 PROCEDURE — 84484 ASSAY OF TROPONIN QUANT: CPT

## 2019-01-17 PROCEDURE — 94760 N-INVAS EAR/PLS OXIMETRY 1: CPT

## 2019-01-17 PROCEDURE — 94640 AIRWAY INHALATION TREATMENT: CPT

## 2019-01-17 PROCEDURE — 25010000002 METHYLPREDNISOLONE PER 125 MG: Performed by: EMERGENCY MEDICINE

## 2019-01-17 PROCEDURE — 85025 COMPLETE CBC W/AUTO DIFF WBC: CPT | Performed by: EMERGENCY MEDICINE

## 2019-01-17 PROCEDURE — 36415 COLL VENOUS BLD VENIPUNCTURE: CPT

## 2019-01-17 PROCEDURE — 71045 X-RAY EXAM CHEST 1 VIEW: CPT

## 2019-01-17 RX ORDER — SODIUM CHLORIDE 0.9 % (FLUSH) 0.9 %
10 SYRINGE (ML) INJECTION AS NEEDED
Status: DISCONTINUED | OUTPATIENT
Start: 2019-01-17 | End: 2019-01-18 | Stop reason: HOSPADM

## 2019-01-17 RX ORDER — ALBUTEROL SULFATE 2.5 MG/3ML
2.5 SOLUTION RESPIRATORY (INHALATION) ONCE
Status: COMPLETED | OUTPATIENT
Start: 2019-01-17 | End: 2019-01-17

## 2019-01-17 RX ORDER — IPRATROPIUM BROMIDE AND ALBUTEROL SULFATE 2.5; .5 MG/3ML; MG/3ML
3 SOLUTION RESPIRATORY (INHALATION) ONCE
Status: COMPLETED | OUTPATIENT
Start: 2019-01-17 | End: 2019-01-17

## 2019-01-17 RX ORDER — METHYLPREDNISOLONE SODIUM SUCCINATE 125 MG/2ML
125 INJECTION, POWDER, LYOPHILIZED, FOR SOLUTION INTRAMUSCULAR; INTRAVENOUS ONCE
Status: COMPLETED | OUTPATIENT
Start: 2019-01-17 | End: 2019-01-17

## 2019-01-17 RX ORDER — IPRATROPIUM BROMIDE AND ALBUTEROL SULFATE 2.5; .5 MG/3ML; MG/3ML
3 SOLUTION RESPIRATORY (INHALATION) 2 TIMES DAILY
COMMUNITY

## 2019-01-17 RX ORDER — METHYLPREDNISOLONE 4 MG/1
TABLET ORAL
Qty: 21 TABLET | Refills: 0 | Status: SHIPPED | OUTPATIENT
Start: 2019-01-17 | End: 2019-05-02

## 2019-01-17 RX ORDER — BUDESONIDE 1 MG/2ML
1 INHALANT ORAL 2 TIMES DAILY
COMMUNITY
End: 2019-02-14

## 2019-01-17 RX ADMIN — SODIUM CHLORIDE 250 ML: 9 INJECTION, SOLUTION INTRAVENOUS at 17:43

## 2019-01-17 RX ADMIN — IOPAMIDOL 60 ML: 755 INJECTION, SOLUTION INTRAVENOUS at 18:49

## 2019-01-17 RX ADMIN — IPRATROPIUM BROMIDE AND ALBUTEROL SULFATE 3 ML: 2.5; .5 SOLUTION RESPIRATORY (INHALATION) at 17:48

## 2019-01-17 RX ADMIN — ALBUTEROL SULFATE 2.5 MG: 2.5 SOLUTION RESPIRATORY (INHALATION) at 21:40

## 2019-01-17 RX ADMIN — IPRATROPIUM BROMIDE AND ALBUTEROL SULFATE 3 ML: 2.5; .5 SOLUTION RESPIRATORY (INHALATION) at 16:58

## 2019-01-17 RX ADMIN — METHYLPREDNISOLONE SODIUM SUCCINATE 125 MG: 125 INJECTION, POWDER, FOR SOLUTION INTRAMUSCULAR; INTRAVENOUS at 21:59

## 2019-01-17 NOTE — ED PROVIDER NOTES
Subjective   Chayr Boucher is a 85 y.o.female with a hx of atrial fibrillation and COPD who presents to the ED with complaints of shortness of breath. The patient's family states that she has had worsening shortness of breath for the past couple of weeks. She will start breathing fast and her heart rate elevates. Any exertion worsens her shortness of breath. Her family says her oxygen will drop down to between 83-89 percent saturation after exerting herself. Prior to her decline in health, she usually had saturations in the mid to upper nineties. She started nebulizer treatments this past week. She has had worsening weakness over the past two weeks since having a broken wrist. The patient reports having an intermittent cough that occasionally produces sputum. She reports vomiting yesterday. She complains of having dysuria, frequency, and urgency. Her family says her urine output was reduced yesterday. She denies any chest pain, palpitations, fever, chills, congestion, diarrhea, or hematochezia. She has a non-interventional abdominal aneurysm. She sees Dr. Cross for atrial fibrillation. She saw her PCP last week for a chest X ray because her family suspected pneumonia, but it was negative. There are no other acute complaints at this time.        History provided by:  Patient and relative  Shortness of Breath   Severity:  Moderate  Onset quality:  Gradual  Duration: last couple of weeks.  Timing:  Constant  Progression:  Worsening  Chronicity:  Recurrent  Relieved by: nebulizer.  Worsened by:  Exertion  Associated symptoms: cough    Associated symptoms: no fever    Risk factors comment:  COPD      Review of Systems   Constitutional: Negative for chills and fever.   HENT: Negative for congestion.    Respiratory: Positive for cough and shortness of breath.    Cardiovascular: Negative for palpitations.   Gastrointestinal: Negative for blood in stool and diarrhea.   Genitourinary: Positive for decreased urine  "volume, dysuria, frequency and urgency.   Neurological: Positive for weakness.   All other systems reviewed and are negative.      Past Medical History:   Diagnosis Date   • Abdominal aortic aneurysm (CMS/[facility]) 10/5/2016    Ultrasound of the abdomen on 03/18/2015 with size measured at 2.3 centimeters.     • Bradycardia 10/5/2016   • COPD (chronic obstructive pulmonary disease) (CMS/[facility]) 10/5/2016   • Coronary disease 10/5/2016    Left heart catheterization 2002, multi-vessel coronary disease/medical therapy. Stress Cardiolite 2004, no ischemia. Chest pain/left heart catheterization, December 2008, Dr. Mendoza revealing significant LV stenosis treated with drug eluting stent.  Normal LV function/”spasm” right coronary artery (catheter induced). Stress Cardiolite, 07/27/2010: Mild anterior ischemia with questionable artifact. Normal LV size and function.   Lexiscan Cardiolite, 03/25/2015 showing left ventricular ejection fraction 75%, no ischemia.    • CVA (cerebral vascular accident) (CMS/[facility]) 10/5/2016   • Degenerative joint disease 10/5/2016    Degenerative joint disease/arthritis   • Dyslipidemia 10/5/2016   • GERD (gastroesophageal reflux disease) 10/5/2016   • Hypertension 10/5/2016   • Hypertensive heart disease 10/5/2016   • Osteoporosis 10/5/2016   • Peripheral vascular disease (CMS/[facility]) 10/5/2016    Severe right common iliac stenosis. Abdominal aortic aneurysm measuring 2 cm in March 2008. Status post successful PTCA and placement of stent, December 2008. Status-post PTCA and stenting of the right ICA, 09/25/2008   • Syncope 10/5/2016   • TIA (transient ischemic attack) 10/5/2016       Allergies   Allergen Reactions   • Adhesive Tape    • Celebrex [Celecoxib]      \"GI PAIN\"   • Epinephrine      \"SHAKES ALL OVER\"   • Keflex [Cephalexin]      \"UNKNOWN\"   • Niaspan [Niacin Er]      \"UNKNOWN\"   • Pacerone [Amiodarone] Hallucinations       Past Surgical History:   Procedure Laterality Date   • APPENDECTOMY     • " CARDIAC CATHETERIZATION N/A 2017    Procedure: Left Heart Cath;  Surgeon: Jesus Araujo MD;  Location:  [facility];  Service:    • CARDIAC ELECTROPHYSIOLOGY PROCEDURE N/A 10/10/2017    Procedure: PPM generator change - dual- MDT;  Surgeon: Ricardo Cross MD;  Location:  [facility];  Service:    • CAROTID STENT Right    • CARPAL TUNNEL RELEASE Bilateral    • CATARACT EXTRACTION BILATERAL W/ ANTERIOR VITRECTOMY     • CHOLECYSTECTOMY     • CORONARY STENT PLACEMENT      TO THE LAD   • ILIAC ARTERY STENT     • KYPHOPLASTY     • PACEMAKER IMPLANTATION      PER DR. CROSS   • REPLACEMENT TOTAL KNEE BILATERAL      3-4 YEARS IN BETWEEN    • ROTATOR CUFF REPAIR Right    • TUBAL ABDOMINAL LIGATION         History reviewed. No pertinent family history.    Social History     Socioeconomic History   • Marital status:      Spouse name: Not on file   • Number of children: Not on file   • Years of education: Not on file   • Highest education level: Not on file   Tobacco Use   • Smoking status: Former Smoker     Years: 15.00     Types: Cigarettes     Last attempt to quit: 1987     Years since quittin.0   • Smokeless tobacco: Never Used   • Tobacco comment: STATES QUIT SMOKING IN    Substance and Sexual Activity   • Alcohol use: No   • Drug use: No   • Sexual activity: Defer         Objective   Physical Exam   Constitutional: She is oriented to person, place, and time. She appears well-developed and well-nourished. No distress.   HENT:   Head: Normocephalic and atraumatic.   Nose: Nose normal.   Eyes: Conjunctivae are normal. No scleral icterus.   Neck: Normal range of motion. Neck supple. No JVD present.   Cardiovascular: Normal rate, regular rhythm and normal heart sounds.   No murmur heard.  Pulmonary/Chest: Effort normal. No respiratory distress. She has rhonchi in the right lower field. She has rales in the left lower field.   Minimal crackles in left base  that clears almost completely with inspiration.    Abdominal: Soft. Bowel sounds are normal. There is no tenderness.   Musculoskeletal: Normal range of motion. She exhibits no edema.   No C,C, or E. No stigmata of DVT.    Neurological: She is alert and oriented to person, place, and time.   Skin: Skin is warm and dry.   Psychiatric: She has a normal mood and affect. Her behavior is normal.   Nursing note and vitals reviewed.      Procedures        Recent Results (from the past 24 hour(s))   Comprehensive Metabolic Panel    Collection Time: 01/17/19  3:57 PM   Result Value Ref Range    Glucose 112 (H) 70 - 100 mg/dL    BUN 20 9 - 23 mg/dL    Creatinine 1.15 0.60 - 1.30 mg/dL    Sodium 138 132 - 146 mmol/L    Potassium 4.0 3.5 - 5.5 mmol/L    Chloride 100 99 - 109 mmol/L    CO2 30.0 20.0 - 31.0 mmol/L    Calcium 10.0 8.7 - 10.4 mg/dL    Total Protein 7.6 5.7 - 8.2 g/dL    Albumin 4.73 3.20 - 4.80 g/dL    ALT (SGPT) 13 7 - 40 U/L    AST (SGOT) 18 0 - 33 U/L    Alkaline Phosphatase 96 25 - 100 U/L    Total Bilirubin 1.4 (H) 0.3 - 1.2 mg/dL    eGFR Non African Amer 45 (L) >60 mL/min/1.73    Globulin 2.9 gm/dL    A/G Ratio 1.6 1.5 - 2.5 g/dL    BUN/Creatinine Ratio 17.4 7.0 - 25.0    Anion Gap 8.0 3.0 - 11.0 mmol/L   BNP    Collection Time: 01/17/19  3:57 PM   Result Value Ref Range    .0 (H) 0.0 - 100.0 pg/mL   Light Blue Top    Collection Time: 01/17/19  3:57 PM   Result Value Ref Range    Extra Tube hold for add-on    Green Top (Gel)    Collection Time: 01/17/19  3:57 PM   Result Value Ref Range    Extra Tube Hold for add-ons.    Lavender Top    Collection Time: 01/17/19  3:57 PM   Result Value Ref Range    Extra Tube hold for add-on    Gold Top - SST    Collection Time: 01/17/19  3:57 PM   Result Value Ref Range    Extra Tube Hold for add-ons.    CBC Auto Differential    Collection Time: 01/17/19  3:57 PM   Result Value Ref Range    WBC 12.79 (H) 3.50 - 10.80 10*3/mm3    RBC 5.24 (H) 3.89 - 5.14 10*6/mm3     Hemoglobin 15.9 (H) 11.5 - 15.5 g/dL    Hematocrit 48.7 (H) 34.5 - 44.0 %    MCV 92.9 80.0 - 99.0 fL    MCH 30.3 27.0 - 31.0 pg    MCHC 32.6 32.0 - 36.0 g/dL    RDW 14.4 11.3 - 14.5 %    RDW-SD 49.0 37.0 - 54.0 fl    MPV 10.8 6.0 - 12.0 fL    Platelets 201 150 - 450 10*3/mm3    Neutrophil % 57.6 41.0 - 71.0 %    Lymphocyte % 28.7 24.0 - 44.0 %    Monocyte % 10.4 0.0 - 12.0 %    Eosinophil % 2.7 0.0 - 3.0 %    Basophil % 0.2 0.0 - 1.0 %    Immature Grans % 0.4 0.0 - 0.6 %    Neutrophils, Absolute 7.38 1.50 - 8.30 10*3/mm3    Lymphocytes, Absolute 3.67 0.60 - 4.80 10*3/mm3    Monocytes, Absolute 1.33 (H) 0.00 - 1.00 10*3/mm3    Eosinophils, Absolute 0.34 (H) 0.00 - 0.30 10*3/mm3    Basophils, Absolute 0.02 0.00 - 0.20 10*3/mm3    Immature Grans, Absolute 0.05 (H) 0.00 - 0.03 10*3/mm3   Magnesium    Collection Time: 01/17/19  3:57 PM   Result Value Ref Range    Magnesium 1.9 1.3 - 2.7 mg/dL   POC Troponin, Rapid    Collection Time: 01/17/19  4:00 PM   Result Value Ref Range    Troponin I 0.00 0.00 - 0.07 ng/mL   POC Troponin, Rapid    Collection Time: 01/17/19  6:11 PM   Result Value Ref Range    Troponin I 0.00 0.00 - 0.07 ng/mL     Note: In addition to lab results from this visit, the labs listed above may include labs taken at another facility or during a different encounter within the last 24 hours. Please correlate lab times with ED admission and discharge times for further clarification of the services performed during this visit.    CT Angiogram Chest With Contrast   Final Result   1. No pulmonary emboli.    2. Bilateral upper and lower lobe bronchial wall thickening, compatible with    reactive airway disease and/or bronchitis.       THIS DOCUMENT HAS BEEN ELECTRONICALLY SIGNED BY MYNOR RUIZ MD      XR Chest 1 View   Final Result   No acute finding.       D:  01/17/2019   E:  01/17/2019       This report was finalized on 1/17/2019 4:08 PM by Warren Campa.            Vitals:    01/17/19 1930 01/17/19 2000  01/17/19 2030 01/17/19 2140   BP: 168/93 158/81 151/95    Pulse: 65      Resp:    (P) 20   Temp:       SpO2:  92% 92%    Weight:       Height:         Medications   sodium chloride 0.9 % flush 10 mL (not administered)   methylPREDNISolone sodium succinate (SOLU-Medrol) injection 125 mg (not administered)   ipratropium-albuterol (DUO-NEB) nebulizer solution 3 mL (3 mL Nebulization Given 1/17/19 1658)   sodium chloride 0.9 % bolus 250 mL (0 mL Intravenous Stopped 1/17/19 1912)   ipratropium-albuterol (DUO-NEB) nebulizer solution 3 mL (3 mL Nebulization Given 1/17/19 1748)   iopamidol (ISOVUE-370) 76 % injection 100 mL (60 mL Intravenous Given 1/17/19 1849)   albuterol (PROVENTIL) nebulizer solution 0.083% 2.5 mg/3mL (2.5 mg Nebulization Given 1/17/19 2140)     ECG/EMG Results (last 24 hours)     Procedure Component Value Units Date/Time    ECG 12 Lead [[id number]] Collected:  01/17/19 1553     Updated:  01/17/19 1633    Narrative:       Test Reason : soa  Blood Pressure : **/** mmHG  Vent. Rate : 071 BPM     Atrial Rate : 071 BPM     P-R Int : 180 ms          QRS Dur : 072 ms      QT Int : 446 ms       P-R-T Axes : 088 -43 053 degrees     QTc Int : 484 ms    Electronic atrial pacemaker  Left axis deviation  Left ventricular hypertrophy with repolarization abnormality  Cannot rule out Septal infarct (cited on or before 26-APR-2017)  Abnormal ECG  When compared with ECG of 26-APR-2017 10:42,  No significant change was found  Confirmed by FATOU JACKMAN MD ([id number]) on 1/17/2019 4:32:57 PM    Referred By:  jane parish           Confirmed By:FATOU JACKMAN MD    ECG 12 Lead [[id number]] Collected:  01/17/19 1805     Updated:  01/17/19 2137    Narrative:       Test Reason : CHEST PAIN  Blood Pressure : **/** mmHG  Vent. Rate : 065 BPM     Atrial Rate : 065 BPM     P-R Int : 120 ms          QRS Dur : 070 ms      QT Int : 440 ms       P-R-T Axes : 000 -47 058 degrees     QTc Int : 457 ms    Electronic atrial pacemaker  Left axis  deviation  Anterior infarct (cited on or before 26-APR-2017)  Abnormal ECG  When compared with ECG of 17-JAN-2019 15:53,  No significant change was found    Confirmed by FATOU PATEL MD ([id number]) on 1/17/2019 9:36:59 PM    Referred By:  AUGUSTINA           Confirmed By:FATOU PATEL MD            ED Course  ED Course as of Jan 17 2143   Thu Jan 17, 2019   1714 Reevaluated the patient at bedside and updated her on findings and plan for further care.   [TJ]   2139 Patient is serially reevaluated throughout the ED course with last reevaluation now.  She improved after her first neb.  She further improved after a second neb and feels much improved now.  Cardiac enzymes ×2 are negative.  BNP is in the 100s.  Pulse ox remains excellent.  Laboratory evaluation is unrewarding otherwiseCT angiogram of the chest reveals bronchitic changes with no PE, no infiltrate and no other acute changesI discussed findings at length with the patient and family.  She feels well enough to go home currently.  She'll receive a third neb prior to discharge.  She is treated with 125 mg of Solu-Medrol.  I discussed placing her on a Medrol Dosepak and discharge.  She has DuoNeb nebs and inhaled budesonide at home.  She already has an appointment with pulmonary medicine tomorrow at 10:00I discussed indications for return, follow-up, and medications with the patient and family at lengthVery pleasant and responsible patient and family verbalized understanding agreement the plan of care, need for follow-up, and indications for immediate return.  She feels quite well enough for discharge now and is certainly in excellent hands with her family on discharge  [HH]      ED Course User Index  [HH] Fatou Patle MD  [TJ] Gerardo Barrientos     Recent Results (from the past 24 hour(s))   Comprehensive Metabolic Panel    Collection Time: 01/17/19  3:57 PM   Result Value Ref Range    Glucose 112 (H) 70 - 100 mg/dL    BUN 20 9 - 23 mg/dL    Creatinine 1.15 0.60 - 1.30  mg/dL    Sodium 138 132 - 146 mmol/L    Potassium 4.0 3.5 - 5.5 mmol/L    Chloride 100 99 - 109 mmol/L    CO2 30.0 20.0 - 31.0 mmol/L    Calcium 10.0 8.7 - 10.4 mg/dL    Total Protein 7.6 5.7 - 8.2 g/dL    Albumin 4.73 3.20 - 4.80 g/dL    ALT (SGPT) 13 7 - 40 U/L    AST (SGOT) 18 0 - 33 U/L    Alkaline Phosphatase 96 25 - 100 U/L    Total Bilirubin 1.4 (H) 0.3 - 1.2 mg/dL    eGFR Non African Amer 45 (L) >60 mL/min/1.73    Globulin 2.9 gm/dL    A/G Ratio 1.6 1.5 - 2.5 g/dL    BUN/Creatinine Ratio 17.4 7.0 - 25.0    Anion Gap 8.0 3.0 - 11.0 mmol/L   BNP    Collection Time: 01/17/19  3:57 PM   Result Value Ref Range    .0 (H) 0.0 - 100.0 pg/mL   Light Blue Top    Collection Time: 01/17/19  3:57 PM   Result Value Ref Range    Extra Tube hold for add-on    Green Top (Gel)    Collection Time: 01/17/19  3:57 PM   Result Value Ref Range    Extra Tube Hold for add-ons.    Lavender Top    Collection Time: 01/17/19  3:57 PM   Result Value Ref Range    Extra Tube hold for add-on    Gold Top - SST    Collection Time: 01/17/19  3:57 PM   Result Value Ref Range    Extra Tube Hold for add-ons.    CBC Auto Differential    Collection Time: 01/17/19  3:57 PM   Result Value Ref Range    WBC 12.79 (H) 3.50 - 10.80 10*3/mm3    RBC 5.24 (H) 3.89 - 5.14 10*6/mm3    Hemoglobin 15.9 (H) 11.5 - 15.5 g/dL    Hematocrit 48.7 (H) 34.5 - 44.0 %    MCV 92.9 80.0 - 99.0 fL    MCH 30.3 27.0 - 31.0 pg    MCHC 32.6 32.0 - 36.0 g/dL    RDW 14.4 11.3 - 14.5 %    RDW-SD 49.0 37.0 - 54.0 fl    MPV 10.8 6.0 - 12.0 fL    Platelets 201 150 - 450 10*3/mm3    Neutrophil % 57.6 41.0 - 71.0 %    Lymphocyte % 28.7 24.0 - 44.0 %    Monocyte % 10.4 0.0 - 12.0 %    Eosinophil % 2.7 0.0 - 3.0 %    Basophil % 0.2 0.0 - 1.0 %    Immature Grans % 0.4 0.0 - 0.6 %    Neutrophils, Absolute 7.38 1.50 - 8.30 10*3/mm3    Lymphocytes, Absolute 3.67 0.60 - 4.80 10*3/mm3    Monocytes, Absolute 1.33 (H) 0.00 - 1.00 10*3/mm3    Eosinophils, Absolute 0.34 (H) 0.00 - 0.30  10*3/mm3    Basophils, Absolute 0.02 0.00 - 0.20 10*3/mm3    Immature Grans, Absolute 0.05 (H) 0.00 - 0.03 10*3/mm3   Magnesium    Collection Time: 01/17/19  3:57 PM   Result Value Ref Range    Magnesium 1.9 1.3 - 2.7 mg/dL   POC Troponin, Rapid    Collection Time: 01/17/19  4:00 PM   Result Value Ref Range    Troponin I 0.00 0.00 - 0.07 ng/mL   POC Troponin, Rapid    Collection Time: 01/17/19  6:11 PM   Result Value Ref Range    Troponin I 0.00 0.00 - 0.07 ng/mL     Note: In addition to lab results from this visit, the labs listed above may include labs taken at another facility or during a different encounter within the last 24 hours. Please correlate lab times with ED admission and discharge times for further clarification of the services performed during this visit.    CT Angiogram Chest With Contrast   Final Result   1. No pulmonary emboli.    2. Bilateral upper and lower lobe bronchial wall thickening, compatible with    reactive airway disease and/or bronchitis.       THIS DOCUMENT HAS BEEN ELECTRONICALLY SIGNED BY MYNOR RUIZ MD      XR Chest 1 View   Final Result   No acute finding.       D:  01/17/2019   E:  01/17/2019       This report was finalized on 1/17/2019 4:08 PM by Warren Campa.            Vitals:    01/17/19 1930 01/17/19 2000 01/17/19 2030 01/17/19 2140   BP: 168/93 158/81 151/95    Pulse: 65      Resp:    (P) 20   Temp:       SpO2:  92% 92%    Weight:       Height:         Medications   sodium chloride 0.9 % flush 10 mL (not administered)   methylPREDNISolone sodium succinate (SOLU-Medrol) injection 125 mg (not administered)   ipratropium-albuterol (DUO-NEB) nebulizer solution 3 mL (3 mL Nebulization Given 1/17/19 1658)   sodium chloride 0.9 % bolus 250 mL (0 mL Intravenous Stopped 1/17/19 1912)   ipratropium-albuterol (DUO-NEB) nebulizer solution 3 mL (3 mL Nebulization Given 1/17/19 1748)   iopamidol (ISOVUE-370) 76 % injection 100 mL (60 mL Intravenous Given 1/17/19 1849)   albuterol  (PROVENTIL) nebulizer solution 0.083% 2.5 mg/3mL (2.5 mg Nebulization Given 1/17/19 2140)     ECG/EMG Results (last 24 hours)     Procedure Component Value Units Date/Time    ECG 12 Lead [[id number]] Collected:  01/17/19 1553     Updated:  01/17/19 1555                        MDM    Final diagnoses:   Shortness of breath   Bronchitis   COPD exacerbation (CMS/[facility])   Chronic anticoagulation       Documentation assistance provided by valerio Barrientos.  Information recorded by the valerio was done at my direction and has been verified and validated by me.     Gerardo Barrientos  01/17/19 1638       Henry Patel MD  01/17/19 2141       Henry Patel MD  01/17/19 2144

## 2019-01-18 NOTE — DISCHARGE INSTRUCTIONS
Rest and continue your current meds and meds.    Use the Medrol dosepak as discussed.  Continue your inhaled steroids and DuoNeb nebs    Follow-up with pulmonary medicine as previously arranged tomorrow    Return to the emergency department at once if you have any acute, urgent, emergent, or progressive symptoms as discussed

## 2019-02-13 ENCOUNTER — TELEPHONE (OUTPATIENT)
Dept: CARDIOLOGY | Facility: CLINIC | Age: 84
End: 2019-02-13

## 2019-02-13 NOTE — TELEPHONE ENCOUNTER
Patient's daughter called concerned about her mother's shortness of breath, fatigue and lower extremity edema. She is going to schedule an appointment with [facility]. She also thinks that she maybe having more frequent episodes of A-fib.

## 2019-02-14 ENCOUNTER — OFFICE VISIT (OUTPATIENT)
Dept: CARDIOLOGY | Facility: CLINIC | Age: 84
End: 2019-02-14

## 2019-02-14 VITALS
OXYGEN SATURATION: 94 % | SYSTOLIC BLOOD PRESSURE: 136 MMHG | BODY MASS INDEX: 26.29 KG/M2 | DIASTOLIC BLOOD PRESSURE: 70 MMHG | WEIGHT: 157.8 LBS | HEART RATE: 67 BPM | HEIGHT: 65 IN

## 2019-02-14 DIAGNOSIS — I48.0 PAROXYSMAL ATRIAL FIBRILLATION (HCC): ICD-10-CM

## 2019-02-14 DIAGNOSIS — R06.02 SHORTNESS OF BREATH: Primary | ICD-10-CM

## 2019-02-14 DIAGNOSIS — R00.1 BRADYCARDIA: ICD-10-CM

## 2019-02-14 PROCEDURE — 99213 OFFICE O/P EST LOW 20 MIN: CPT | Performed by: PHYSICIAN ASSISTANT

## 2019-02-14 PROCEDURE — 93000 ELECTROCARDIOGRAM COMPLETE: CPT | Performed by: PHYSICIAN ASSISTANT

## 2019-02-14 RX ORDER — IPRATROPIUM BROMIDE AND ALBUTEROL SULFATE 2.5; .5 MG/3ML; MG/3ML
3 SOLUTION RESPIRATORY (INHALATION) EVERY 4 HOURS PRN
COMMUNITY
End: 2019-05-02 | Stop reason: SDUPTHER

## 2019-02-14 RX ORDER — FUROSEMIDE 40 MG/1
40 TABLET ORAL DAILY PRN
Qty: 30 TABLET | Refills: 1 | Status: SHIPPED | OUTPATIENT
Start: 2019-02-14 | End: 2020-02-19

## 2019-02-14 RX ORDER — PROPRANOLOL HYDROCHLORIDE 160 MG/1
160 CAPSULE, EXTENDED RELEASE ORAL
Qty: 90 CAPSULE | Refills: 3 | Status: SHIPPED | OUTPATIENT
Start: 2019-02-14 | End: 2019-05-02 | Stop reason: ALTCHOICE

## 2019-02-14 NOTE — PROGRESS NOTES
"[facility]quez[facility]  11/18/1933  [phone]    02/14/2019    [facility] CARDIOLOGY     Saige Tobias MD  [address] [place] [address]    Chief Complaint   Patient presents with   • Shortness of Breath   • Atrial Fibrillation       Problem List:   1. Coronary disease:  a. Left heart catheterization 2002, multi-vessel coronary disease/medical therapy.  b. Stress Cardiolite 2004, no ischemia.  c. Chest pain/left heart catheterization, December 2008, Dr. Mendoza revealing significant LV stenosis treated with drug eluting stent. Normal LV function/”spasm” right coronary artery (catheter induced).  d. Stress Cardiolite, 07/27/2010: Mild anterior ischemia with questionable artifact. Normal LV size and function.   e. Lexiscan Cardiolite, 03/25/2015 showing left ventricular ejection fraction 75%, no ischemia.   f. [facility] Dr Araujo with PTCA/stenting of LAD and LCFX with ESTEPHANIA 4/2017  2. Peripheral vascular disease:  a. Severe right common iliac stenosis.  b. Abdominal aortic aneurysm measuring 2 cm in March 2008.  c. Status post successful PTCA and placement of stent, December 2008.  d. Status-post PTCA and stenting of the right ICA, 09/25/2008.   3. Atrial fibrillation:  a. Tikosyn therapy 10/25/2007.  b. Medtronic dual chamber pacemaker.  c. Generator change ([facility]) 10/10/17  4. Essential Hypertension.  5. Dyslipidemia.  6. Degenerative joint disease/arthritis.  7. GERD.  8. Osteoporosis.  9. Abdominal aortic aneurysm:  a. Ultrasound of the abdomen on 03/18/2015 with size measured at 2.3 centimeters.        Allergies  Allergies   Allergen Reactions   • Adhesive Tape    • Celebrex [Celecoxib]      \"GI PAIN\"   • Epinephrine      \"SHAKES ALL OVER\"   • Keflex [Cephalexin]      \"UNKNOWN\"   • Niaspan [Niacin Er]      \"UNKNOWN\"   • Pacerone [Amiodarone] Hallucinations       Current Medications    Current Outpatient Medications:   •  apixaban (ELIQUIS) 2.5 MG tablet tablet, Take 2.5 " mg by mouth 2 (Two) Times a Day., Disp: , Rfl:   •  atorvastatin (LIPITOR) 40 MG tablet, Take 40 mg by mouth Every Night., Disp: , Rfl:   •  busPIRone (BUSPAR) 5 MG tablet, Take 5 mg by mouth 2 (Two) Times a Day., Disp: , Rfl:   •  Calcium Carb-Cholecalciferol (CALCIUM 600 + D PO), Take 1 tablet by mouth Every Night., Disp: , Rfl:   •  CloNIDine (CATAPRES) 0.1 MG tablet, Take 0.1 mg by mouth Daily As Needed for High Blood Pressure (for SBP>170)., Disp: , Rfl:   •  dofetilide (TIKOSYN) 125 MCG capsule, Take 1 capsule by mouth 2 (Two) Times a Day., Disp: 180 capsule, Rfl: 2  •  escitalopram (LEXAPRO) 10 MG tablet, Take 10 mg by mouth Every Morning., Disp: , Rfl:   •  ipratropium-albuterol (DUO-NEB) 0.5-2.5 mg/3 ml nebulizer, Take 3 mL by nebulization Every 4 (Four) Hours As Needed for Wheezing., Disp: , Rfl:   •  ipratropium-albuterol (DUO-NEB) 0.5-2.5 mg/3 ml nebulizer, Take 3 mL by nebulization Every 4 (Four) Hours As Needed for Wheezing., Disp: , Rfl:   •  isosorbide mononitrate (IMDUR) 30 MG 24 hr tablet, Take 30 mg by mouth Every Morning., Disp: , Rfl:   •  losartan (COZAAR) 50 MG tablet, Take 1 tablet by mouth Daily. (Patient taking differently: Take 100 mg by mouth Daily. 2 tablets at bedtime), Disp: 90 tablet, Rfl: 3  •  MethylPREDNISolone (MEDROL, MARTHA,) 4 MG tablet, Take as directed on package instructions., Disp: 21 tablet, Rfl: 0  •  nitroglycerin (NITROSTAT) 0.4 MG SL tablet, Place 0.4 mg under the tongue Every 5 (Five) Minutes As Needed for Chest Pain. Take no more than 3 doses in 15 minutes., Disp: , Rfl:   •  O2 (OXYGEN), Inhale 3 L/min Daily., Disp: , Rfl:   •  omeprazole (PriLOSEC) 20 MG capsule, Take 20 mg by mouth Every Morning., Disp: , Rfl:   •  Probiotic Product (PreViser) capsule, Take 1 capsule by mouth Every Morning., Disp: , Rfl:   •  propranolol LA (INDERAL LA) 160 MG 24 hr capsule, Take 1 capsule by mouth Daily., Disp: 90 capsule, Rfl: 3  •  traMADol (ULTRAM) 50 MG tablet, Take  " by mouth 3 (Three) Times a Day. TAKES 100 MG PO QAM, 100 MG PO AT 1500, AND 1 TAB PO QHS, Disp: , Rfl:   •  furosemide (LASIX) 40 MG tablet, Take 1 tablet by mouth Daily As Needed (lower extremity edema/SOB). Take once daily for 3 days as needed for swelling/SOB, Disp: 30 tablet, Rfl: 1    History of Present Illness     Pt presents for requested follow up due to SOB, lower extremity edema per request of her daughter. She was last seen in October in our office. At that time, she was doing fairly well, but requested to wean down on her Propanolol due to fatigue. Since that time, she has been having more episodes of SOB and times where she all of a sudden feels like she is breathing fast. This occurs at random, usually while sitting and lasts a few minutes. Not associated with chest pain or dizziness. She cannot feel any palpitations. She and her daughters wonder if it is due to the reduction in propanolol. She does have chronic hypoxia with COPD and is on O2 via NC 24/7. She recently was treated with antibiotics, steroids, and nebs for bronchitis in January. She has had more lower extremity swelling over the past few weeks. She denies issues with her Eliquis and denies CVA like symptoms.     ROS:  General:  + fatigue, - weight gain or loss  Cardiovascular:  Denies CP, PND, syncope, near syncope, + edema or palpitations.  Pulmonary: +DENNIS, - cough, or wheezing      Vitals:    02/14/19 1239   BP: 136/70   BP Location: Left arm   Patient Position: Sitting   Pulse: 67   SpO2: 94% on 3L portable 02   Weight: 71.6 kg (157 lb 12.8 oz)   Height: 165.1 cm (65\")     Body mass index is 26.26 kg/m².  PE:  General: NAD. A & O x 3. O2 NC in place. Patient appears comfortable. Tremor present with head and hands  Neck: no JVD, no carotid bruits, no TM  Heart RRR, NL S1, S2, S4 present, no rubs, murmurs  Lungs: mild bilateral crackles at bases, scant wheezes  Abd: soft, non-tender, NL BS  Ext: No musculoskeletal deformities, 1+ ankle " edema, - cyanosis, or clubbing  Psych: normal mood and affect    Diagnostic Data:        ECG 12 Lead  Date/Time: 2/14/2019 3:29 PM  Performed by: Bridget Kerr PA  Authorized by: Bridget Kerr PA   Comparison: compared with previous ECG from 1/17/2019  Similar to previous ECG  Comparison to previous ECG: QTc  ms  Rhythm: sinus rhythm and paced  BPM: 67            PM check: normal function. 1.3% V paced. 86% RA paced. 10 years on battery. 5.4% atrial Fibrillation    1. Shortness of breath    2. Paroxysmal atrial fibrillation (CMS/HCC)    3. Bradycardia          Plan:  1. Shortness of breath:  - multifactorial with lung disease and atrial fibrillation , however, patient appears to have some mild volume overload with lower extremity edema and some crackles on exam. O2 sat on her usual amount of oxygen is 94%. I will prescribe Lasix 40 mg x 3 days and then as needed with swelling and SOB, to be taken with a banana or OJ. She will call us in the next few days if she does not have improvement.   - Also, episodes of rapid breathing are possibly related to having slightly more episodes of atrial fibrillation and daughters have noticed this has occurred more since reduction of her propanolol. I will increase her back to Propanolol  mg daily.   - will hold off on checking echocardiogram at this time as she is not significantly V pacing and EF normal in the past.     2. PAF:  - QTc WNL on EKG as patient is on Tikosyn  - 5.4% burden by device check, slightly increased from 4% burden in October 2018, which is not significant but she is feeling worse since reducing her Propanolol back in October  - will increase Propanolol to 160 mg daily from 80 mg daily    3. Bradycardia:  - normal PM function with only 1.3% V pacing.       F/up in 4 months    [facility]  2/14/2019   3:30 PM

## 2019-02-21 ENCOUNTER — TELEPHONE (OUTPATIENT)
Dept: CARDIOLOGY | Facility: CLINIC | Age: 84
End: 2019-02-21

## 2019-02-21 NOTE — TELEPHONE ENCOUNTER
Called patient to follow up after she took Lasix last week. Her swelling is doing better and also breathing is better. She will calls us if she has any issues before her next appointment. She is aware that she can take Lasix as needed.

## 2019-03-20 ENCOUNTER — CLINICAL SUPPORT NO REQUIREMENTS (OUTPATIENT)
Dept: CARDIOLOGY | Facility: CLINIC | Age: 84
End: 2019-03-20

## 2019-03-20 DIAGNOSIS — R00.1 BRADYCARDIA: ICD-10-CM

## 2019-03-20 DIAGNOSIS — I48.0 PAROXYSMAL ATRIAL FIBRILLATION (HCC): ICD-10-CM

## 2019-03-20 PROCEDURE — 93296 REM INTERROG EVL PM/IDS: CPT | Performed by: INTERNAL MEDICINE

## 2019-03-20 PROCEDURE — 93294 REM INTERROG EVL PM/LDLS PM: CPT | Performed by: INTERNAL MEDICINE

## 2019-04-18 ENCOUNTER — TELEPHONE (OUTPATIENT)
Dept: CARDIOLOGY | Facility: CLINIC | Age: 84
End: 2019-04-18

## 2019-05-01 ENCOUNTER — HOSPITAL ENCOUNTER (EMERGENCY)
Facility: HOSPITAL | Age: 84
Discharge: HOME | End: 2019-05-02
Attending: EMERGENCY MEDICINE | Admitting: EMERGENCY MEDICINE

## 2019-05-01 ENCOUNTER — TELEPHONE (OUTPATIENT)
Dept: CARDIOLOGY | Facility: CLINIC | Age: 84
End: 2019-05-01

## 2019-05-01 ENCOUNTER — APPOINTMENT (OUTPATIENT)
Dept: CT IMAGING | Facility: HOSPITAL | Age: 84
End: 2019-05-01

## 2019-05-01 DIAGNOSIS — R09.89 LABILE HYPERTENSION: Primary | ICD-10-CM

## 2019-05-01 DIAGNOSIS — S32.010A CLOSED COMPRESSION FRACTURE OF FIRST LUMBAR VERTEBRA, INITIAL ENCOUNTER: ICD-10-CM

## 2019-05-01 LAB
ALBUMIN SERPL-MCNC: 3.6 G/DL (ref 3.5–5.2)
ALBUMIN/GLOB SERPL: 0.8 G/DL
ALP SERPL-CCNC: 95 U/L (ref 39–117)
ALT SERPL W P-5'-P-CCNC: 13 U/L (ref 1–33)
ANION GAP SERPL CALCULATED.3IONS-SCNC: 14 MMOL/L
AST SERPL-CCNC: 19 U/L (ref 1–32)
BACTERIA UR QL AUTO: ABNORMAL /HPF
BASOPHILS # BLD AUTO: 0.01 10*3/MM3 (ref 0–0.2)
BASOPHILS NFR BLD AUTO: 0.1 % (ref 0–1.5)
BILIRUB SERPL-MCNC: 1 MG/DL (ref 0.2–1.2)
BILIRUB UR QL STRIP: NEGATIVE
BUN BLD-MCNC: 22 MG/DL (ref 8–23)
BUN/CREAT SERPL: 19.3 (ref 7–25)
CALCIUM SPEC-SCNC: 10.3 MG/DL (ref 8.6–10.5)
CHLORIDE SERPL-SCNC: 97 MMOL/L (ref 98–107)
CLARITY UR: CLEAR
CO2 SERPL-SCNC: 22 MMOL/L (ref 22–29)
COLOR UR: YELLOW
CREAT BLD-MCNC: 1.14 MG/DL (ref 0.57–1)
DEPRECATED RDW RBC AUTO: 50.8 FL (ref 37–54)
EOSINOPHIL # BLD AUTO: 0.06 10*3/MM3 (ref 0–0.4)
EOSINOPHIL NFR BLD AUTO: 0.4 % (ref 0.3–6.2)
ERYTHROCYTE [DISTWIDTH] IN BLOOD BY AUTOMATED COUNT: 14.5 % (ref 12.3–15.4)
GFR SERPL CREATININE-BSD FRML MDRD: 45 ML/MIN/1.73
GLOBULIN UR ELPH-MCNC: 4.4 GM/DL
GLUCOSE BLD-MCNC: 106 MG/DL (ref 65–99)
GLUCOSE UR STRIP-MCNC: NEGATIVE MG/DL
HCT VFR BLD AUTO: 50 % (ref 34–46.6)
HGB BLD-MCNC: 16.3 G/DL (ref 12–15.9)
HGB UR QL STRIP.AUTO: NEGATIVE
HOLD SPECIMEN: NORMAL
HOLD SPECIMEN: NORMAL
HYALINE CASTS UR QL AUTO: ABNORMAL /LPF
IMM GRANULOCYTES # BLD AUTO: 0.1 10*3/MM3 (ref 0–0.05)
IMM GRANULOCYTES NFR BLD AUTO: 0.6 % (ref 0–0.5)
KETONES UR QL STRIP: ABNORMAL
LEUKOCYTE ESTERASE UR QL STRIP.AUTO: ABNORMAL
LYMPHOCYTES # BLD AUTO: 3.45 10*3/MM3 (ref 0.7–3.1)
LYMPHOCYTES NFR BLD AUTO: 20.6 % (ref 19.6–45.3)
MCH RBC QN AUTO: 31 PG (ref 26.6–33)
MCHC RBC AUTO-ENTMCNC: 32.6 G/DL (ref 31.5–35.7)
MCV RBC AUTO: 95.1 FL (ref 79–97)
MONOCYTES # BLD AUTO: 2.01 10*3/MM3 (ref 0.1–0.9)
MONOCYTES NFR BLD AUTO: 12 % (ref 5–12)
NEUTROPHILS # BLD AUTO: 11.24 10*3/MM3 (ref 1.7–7)
NEUTROPHILS NFR BLD AUTO: 66.9 % (ref 42.7–76)
NITRITE UR QL STRIP: NEGATIVE
PH UR STRIP.AUTO: 6 [PH] (ref 5–8)
PLATELET # BLD AUTO: 186 10*3/MM3 (ref 140–450)
PMV BLD AUTO: 11.7 FL (ref 6–12)
POTASSIUM BLD-SCNC: 4.7 MMOL/L (ref 3.5–5.2)
PROT SERPL-MCNC: 8 G/DL (ref 6–8.5)
PROT UR QL STRIP: ABNORMAL
RBC # BLD AUTO: 5.26 10*6/MM3 (ref 3.77–5.28)
RBC # UR: ABNORMAL /HPF
REF LAB TEST METHOD: ABNORMAL
SODIUM BLD-SCNC: 133 MMOL/L (ref 136–145)
SP GR UR STRIP: 1.02 (ref 1–1.03)
SQUAMOUS #/AREA URNS HPF: ABNORMAL /HPF
UROBILINOGEN UR QL STRIP: ABNORMAL
WBC NRBC COR # BLD: 16.77 10*3/MM3 (ref 3.4–10.8)
WBC UR QL AUTO: ABNORMAL /HPF
WHOLE BLOOD HOLD SPECIMEN: NORMAL
WHOLE BLOOD HOLD SPECIMEN: NORMAL

## 2019-05-01 PROCEDURE — 80053 COMPREHEN METABOLIC PANEL: CPT

## 2019-05-01 PROCEDURE — 81001 URINALYSIS AUTO W/SCOPE: CPT | Performed by: EMERGENCY MEDICINE

## 2019-05-01 PROCEDURE — 72131 CT LUMBAR SPINE W/O DYE: CPT

## 2019-05-01 PROCEDURE — 72128 CT CHEST SPINE W/O DYE: CPT

## 2019-05-01 PROCEDURE — 85025 COMPLETE CBC W/AUTO DIFF WBC: CPT

## 2019-05-01 PROCEDURE — 99284 EMERGENCY DEPT VISIT MOD MDM: CPT

## 2019-05-01 RX ORDER — ACETAMINOPHEN 500 MG
1000 TABLET ORAL ONCE
Status: COMPLETED | OUTPATIENT
Start: 2019-05-01 | End: 2019-05-01

## 2019-05-01 RX ORDER — SODIUM CHLORIDE 0.9 % (FLUSH) 0.9 %
10 SYRINGE (ML) INJECTION AS NEEDED
Status: DISCONTINUED | OUTPATIENT
Start: 2019-05-01 | End: 2019-05-02 | Stop reason: HOSPADM

## 2019-05-01 RX ORDER — LISINOPRIL 5 MG/1
5 TABLET ORAL ONCE
Status: COMPLETED | OUTPATIENT
Start: 2019-05-01 | End: 2019-05-01

## 2019-05-01 RX ORDER — HYDRALAZINE HYDROCHLORIDE 20 MG/ML
10 INJECTION INTRAMUSCULAR; INTRAVENOUS ONCE
Status: DISCONTINUED | OUTPATIENT
Start: 2019-05-01 | End: 2019-05-01

## 2019-05-01 RX ADMIN — SODIUM CHLORIDE 500 ML: 9 INJECTION, SOLUTION INTRAVENOUS at 23:31

## 2019-05-01 RX ADMIN — ACETAMINOPHEN 1000 MG: 500 TABLET, FILM COATED ORAL at 21:09

## 2019-05-01 RX ADMIN — LISINOPRIL 5 MG: 5 TABLET ORAL at 19:29

## 2019-05-01 NOTE — TELEPHONE ENCOUNTER
Patient's daughter, Jessica, called and wanted to schedule an appointment for her mother to be seen. She fell on Friday and had to go to the ER. She went to the ER in [place] and then was sent to the ER at . They had to stitch her ear laceration. They gave her Hydrocodone for pain. Her BP was high then also. Her daughter said that her BP has been elevated since Friday. She said that it will be really high in the morning but it goes down in the afternoon. She said that her mother's BP is 191/120 after her morning medications. I instructed her to give her one hydrocodone b/c she may be in pain. I told her to recheck her BP in a couple of hours and if it is still elevated that she needs to take her to the ER. She verbalized understanding. She has an appointment to be seen in the office tomorrow.

## 2019-05-01 NOTE — ED PROVIDER NOTES
Subjective   Chary Boucher is an 85 y.o.female who presents to the emergency department with complaints of hypertension. The patient is accompanied by two of her daughters who report that over the last four days her blood pressure has been as high as 190/120 after her morning medications. Her blood pressure stays high in the morning but goes down in the afternoon. She has not had a change in her medication other than receiving a steroid injection about a week ago for a cough, shortness of breath, and chest congestion. Her cough and congestion has resolved. She still feels like she's having increased difficulty breathing although her daughters note that her saturations have been in the mid to upper nineties. The patient fell last week, went to the ER in [place], then was sent to the ER at . Since the fall she has been limping with her right leg when she walks and has complained of back pain when she is moving. She has been unable to verbalize a specific location of her discomfort. Her daughters also feel that her mentation and thought processes seem slower than usual. She has some memory issues at baseline but seems to be struggling more today. She reports vomiting this morning prior to arrival. She feels better now and has no abdominal pain or nausea currently. There are no other acute complaints at this time.                History provided by:  Patient and relative  Hypertension   Severity:  Moderate  Onset quality:  Sudden  Duration:  4 days  Timing:  Constant  Progression:  Unchanged  Chronicity:  New  Notable PTA blood pressures:  190/120  Context: not medication change and not noncompliance    Relieved by:  Nothing  Worsened by:  Nothing  Ineffective treatments:  Medication  Associated symptoms: shortness of breath and vomiting    Associated symptoms: no abdominal pain, no nausea and no peripheral edema        Review of Systems   HENT: Negative for congestion.    Respiratory: Positive for shortness of  "breath. Negative for cough.    Gastrointestinal: Positive for vomiting. Negative for abdominal pain and nausea.   Musculoskeletal: Positive for back pain and gait problem (limping).   All other systems reviewed and are negative.      Past Medical History:   Diagnosis Date   • Abdominal aortic aneurysm (CMS/[facility]) 10/5/2016    Ultrasound of the abdomen on 03/18/2015 with size measured at 2.3 centimeters.     • Bradycardia 10/5/2016   • Broken wrist 12/13/2018   • COPD (chronic obstructive pulmonary disease) (CMS/[facility]) 10/5/2016   • Coronary disease 10/5/2016    Left heart catheterization 2002, multi-vessel coronary disease/medical therapy. Stress Cardiolite 2004, no ischemia. Chest pain/left heart catheterization, December 2008, Dr. Mendoza revealing significant LV stenosis treated with drug eluting stent.  Normal LV function/”spasm” right coronary artery (catheter induced). Stress Cardiolite, 07/27/2010: Mild anterior ischemia with questionable artifact. Normal LV size and function.   Lexiscan Cardiolite, 03/25/2015 showing left ventricular ejection fraction 75%, no ischemia.    • CVA (cerebral vascular accident) (CMS/[facility]) 10/5/2016   • Degenerative joint disease 10/5/2016    Degenerative joint disease/arthritis   • Dyslipidemia 10/5/2016   • GERD (gastroesophageal reflux disease) 10/5/2016   • Hypertension 10/5/2016   • Hypertensive heart disease 10/5/2016   • Osteoporosis 10/5/2016   • Peripheral vascular disease (CMS/[facility]) 10/5/2016    Severe right common iliac stenosis. Abdominal aortic aneurysm measuring 2 cm in March 2008. Status post successful PTCA and placement of stent, December 2008. Status-post PTCA and stenting of the right ICA, 09/25/2008   • Syncope 10/5/2016   • TIA (transient ischemic attack) 10/5/2016       Allergies   Allergen Reactions   • Adhesive Tape    • Celebrex [Celecoxib]      \"GI PAIN\"   • Epinephrine      \"SHAKES ALL OVER\"   • Keflex [Cephalexin]      \"UNKNOWN\"   • Niaspan [Niacin Er]      " "\"UNKNOWN\"   • Pacerone [Amiodarone] Hallucinations       Past Surgical History:   Procedure Laterality Date   • APPENDECTOMY     • CARDIAC CATHETERIZATION N/A 2017    Procedure: Left Heart Cath;  Surgeon: Jesus Araujo MD;  Location:  [facility];  Service:    • CARDIAC ELECTROPHYSIOLOGY PROCEDURE N/A 10/10/2017    Procedure: PPM generator change - dual- MDT;  Surgeon: Ricardo Cross MD;  Location:  [facility] EP INVASIVE [facility];  Service:    • CAROTID STENT Right    • CARPAL TUNNEL RELEASE Bilateral    • CATARACT EXTRACTION BILATERAL W/ ANTERIOR VITRECTOMY     • CHOLECYSTECTOMY     • CORONARY STENT PLACEMENT      TO THE LAD   • ILIAC ARTERY STENT     • KYPHOPLASTY     • PACEMAKER IMPLANTATION      PER DR. CROSS   • REPLACEMENT TOTAL KNEE BILATERAL      3-4 YEARS IN BETWEEN    • ROTATOR CUFF REPAIR Right    • TUBAL ABDOMINAL LIGATION         History reviewed. No pertinent family history.    Social History     Socioeconomic History   • Marital status:      Spouse name: Not on file   • Number of children: Not on file   • Years of education: Not on file   • Highest education level: Not on file   Tobacco Use   • Smoking status: Former Smoker     Years: 15.00     Types: Cigarettes     Last attempt to quit: 1987     Years since quittin.3   • Smokeless tobacco: Never Used   • Tobacco comment: STATES QUIT SMOKING IN    Substance and Sexual Activity   • Alcohol use: No   • Drug use: No   • Sexual activity: Defer         Objective   Physical Exam   Constitutional: She is oriented to person, place, and time. She appears well-developed and well-nourished. No distress.   HENT:   Head: Normocephalic and atraumatic.   Nose: Nose normal.   Mouth/Throat: Oropharynx is clear and moist.   Airway patent. Oropharynx benign. Nicely healing sutured wound on the left earlobe.   Eyes: Conjunctivae are normal. No scleral icterus.   Neck: Normal range of motion and phonation normal. Neck " "supple.   Cardiovascular: Normal rate, regular rhythm and normal heart sounds.   Pulmonary/Chest: Effort normal and breath sounds normal. No respiratory distress.   Neurological: She is alert and oriented to person, place, and time.   Decent  strength. She has a short arm cast on her left forearm, wrist, and hand.    Skin: Skin is warm and dry.   Psychiatric: She has a normal mood and affect. Her behavior is normal.   Nursing note and vitals reviewed.      Procedures         ED Course  ED Course as of May 02 0145   Wed May 01, 2019   1920 Blood pressure has jumped up since the CT scan. Systolic has been as high as 212. Will treat and watch a little longer.  [AS]   2054 Dr. Leong is at the bedside reevaluating the patient. Blood pressure is down to 179/100.  [AS]   2236 Her BPs have been all over the map with systolics > 200 and now systolic of 108 after only 5 mg of lisinopril.  Will wait for one or two more pressure checks before deciding on plan.  Anticipate sending home since no issue with hypertensive urgency at this moment.  [LI]   2319 Patient's blood pressure dropped to 98/59. Will give bolus of fluids. Still looking to discharge.  [AS]   Thu May 02, 2019   0035 Leucocytosis without clear reason, but no obvious infection.  She did receive some sort of steroid shot earlier in April.  Her last several CBCs showed leucocytosis, too.  BP has finally \"stabilized\" with several recent pressures normal or only slightly low.  Will not recommend any changes in her BP med dosing.  Family understands that chasing these labile numbers will not be fruitful.  Hopefully when back heals, BP will stabilize.  [LI]      ED Course User Index  [AS] Carla Ibrahim  [LI] Vinny Leong MD     Recent Results (from the past 24 hour(s))   Comprehensive Metabolic Panel    Collection Time: 05/01/19  2:57 PM   Result Value Ref Range    Glucose 106 (H) 65 - 99 mg/dL    BUN 22 8 - 23 mg/dL    Creatinine 1.14 (H) 0.57 - 1.00 mg/dL "    Sodium 133 (L) 136 - 145 mmol/L    Potassium 4.7 3.5 - 5.2 mmol/L    Chloride 97 (L) 98 - 107 mmol/L    CO2 22.0 22.0 - 29.0 mmol/L    Calcium 10.3 8.6 - 10.5 mg/dL    Total Protein 8.0 6.0 - 8.5 g/dL    Albumin 3.60 3.50 - 5.20 g/dL    ALT (SGPT) 13 1 - 33 U/L    AST (SGOT) 19 1 - 32 U/L    Alkaline Phosphatase 95 39 - 117 U/L    Total Bilirubin 1.0 0.2 - 1.2 mg/dL    eGFR Non African Amer 45 (L) >60 mL/min/1.73    Globulin 4.4 gm/dL    A/G Ratio 0.8 g/dL    BUN/Creatinine Ratio 19.3 7.0 - 25.0    Anion Gap 14.0 mmol/L   Light Blue Top    Collection Time: 05/01/19  2:57 PM   Result Value Ref Range    Extra Tube hold for add-on    Green Top (Gel)    Collection Time: 05/01/19  2:57 PM   Result Value Ref Range    Extra Tube Hold for add-ons.    Lavender Top    Collection Time: 05/01/19  2:57 PM   Result Value Ref Range    Extra Tube hold for add-on    Gold Top - SST    Collection Time: 05/01/19  2:57 PM   Result Value Ref Range    Extra Tube Hold for add-ons.    CBC Auto Differential    Collection Time: 05/01/19  2:57 PM   Result Value Ref Range    WBC 16.77 (H) 3.40 - 10.80 10*3/mm3    RBC 5.26 3.77 - 5.28 10*6/mm3    Hemoglobin 16.3 (H) 12.0 - 15.9 g/dL    Hematocrit 50.0 (H) 34.0 - 46.6 %    MCV 95.1 79.0 - 97.0 fL    MCH 31.0 26.6 - 33.0 pg    MCHC 32.6 31.5 - 35.7 g/dL    RDW 14.5 12.3 - 15.4 %    RDW-SD 50.8 37.0 - 54.0 fl    MPV 11.7 6.0 - 12.0 fL    Platelets 186 140 - 450 10*3/mm3    Neutrophil % 66.9 42.7 - 76.0 %    Lymphocyte % 20.6 19.6 - 45.3 %    Monocyte % 12.0 5.0 - 12.0 %    Eosinophil % 0.4 0.3 - 6.2 %    Basophil % 0.1 0.0 - 1.5 %    Immature Grans % 0.6 (H) 0.0 - 0.5 %    Neutrophils, Absolute 11.24 (H) 1.70 - 7.00 10*3/mm3    Lymphocytes, Absolute 3.45 (H) 0.70 - 3.10 10*3/mm3    Monocytes, Absolute 2.01 (H) 0.10 - 0.90 10*3/mm3    Eosinophils, Absolute 0.06 0.00 - 0.40 10*3/mm3    Basophils, Absolute 0.01 0.00 - 0.20 10*3/mm3    Immature Grans, Absolute 0.10 (H) 0.00 - 0.05 10*3/mm3    Urinalysis With Microscopic If Indicated (No Culture) - Urine, Clean Catch    Collection Time: 05/01/19  7:45 PM   Result Value Ref Range    Color, UA Yellow Yellow, Straw    Appearance, UA Clear Clear    pH, UA 6.0 5.0 - 8.0    Specific Gravity, UA 1.022 1.001 - 1.030    Glucose, UA Negative Negative    Ketones, UA Trace (A) Negative    Bilirubin, UA Negative Negative    Blood, UA Negative Negative    Protein,  mg/dL (2+) (A) Negative    Leuk Esterase, UA Trace (A) Negative    Nitrite, UA Negative Negative    Urobilinogen, UA 0.2 E.U./dL 0.2 - 1.0 E.U./dL   Urinalysis, Microscopic Only - Urine, Clean Catch    Collection Time: 05/01/19  7:45 PM   Result Value Ref Range    RBC, UA 0-2 None Seen, 0-2 /HPF    WBC, UA 3-5 (A) None Seen, 0-2 /HPF    Bacteria, UA None Seen None Seen, Trace /HPF    Squamous Epithelial Cells, UA 0-2 None Seen, 0-2 /HPF    Hyaline Casts, UA 0-6 0 - 6 /LPF    Methodology Automated Microscopy      Note: In addition to lab results from this visit, the labs listed above may include labs taken at another facility or during a different encounter within the last 24 hours. Please correlate lab times with ED admission and discharge times for further clarification of the services performed during this visit.    CT Lumbar Spine Without Contrast   Preliminary Result   There is moderate central compression of the L1 vertebral   body. There are areas of cortical discontinuity along the superior   rightward and lateral aspect of L1, suggesting an acute injury. This   does not compromise the neural canal. However, there are no old images   for comparison. Lastly there is diffuse osteoarthritis throughout the   lumbar spine and there are changes related to kyphoplasty at L2.       DICTATED:   05/01/2019   EDITED/ls :   05/01/2019           CT Thoracic Spine Without Contrast   Preliminary Result   There are no acute findings in the thoracic distribution.   There is diffuse osteopenia and there are  moderate osteoarthritic   changes, primarily in the mid and lower thoracic distribution.       DICTATED:   05/01/2019   EDITED/ls :   05/01/2019             Vitals:    05/01/19 2345 05/02/19 0015 05/02/19 0030 05/02/19 0045   BP: 132/67 112/65 121/66 118/69   BP Location:       Patient Position:       Pulse: 69 69 69 69   Resp:       Temp:       TempSrc:       SpO2: 93% 96% 95% 95%   Weight:       Height:         Medications   sodium chloride 0.9 % flush 10 mL (not administered)   lisinopril (PRINIVIL,ZESTRIL) tablet 5 mg (5 mg Oral Given 5/1/19 1929)   acetaminophen (TYLENOL) tablet 1,000 mg (1,000 mg Oral Given 5/1/19 2109)   sodium chloride 0.9 % bolus 500 mL (0 mL Intravenous Stopped 5/2/19 0115)     ECG/EMG Results (last 24 hours)     ** No results found for the last 24 hours. **        No orders to display                      MDM    Final diagnoses:   Labile hypertension   Closed compression fracture of first lumbar vertebra, initial encounter (CMS/[facility])       Documentation assistance provided by valerio Ibrahim.  Information recorded by the scribe was done at my direction and has been verified and validated by me.     Carla Ibrahim  05/01/19 3539       Vinny Leong MD  05/02/19 8326

## 2019-05-01 NOTE — TELEPHONE ENCOUNTER
Patient's daughter called and left a message. I tried to call her back. No answer. I left a message.

## 2019-05-02 ENCOUNTER — OFFICE VISIT (OUTPATIENT)
Dept: CARDIOLOGY | Facility: CLINIC | Age: 84
End: 2019-05-02

## 2019-05-02 VITALS
BODY MASS INDEX: 26.66 KG/M2 | HEIGHT: 65 IN | DIASTOLIC BLOOD PRESSURE: 56 MMHG | SYSTOLIC BLOOD PRESSURE: 98 MMHG | HEART RATE: 76 BPM | WEIGHT: 160 LBS

## 2019-05-02 VITALS
BODY MASS INDEX: 26.66 KG/M2 | RESPIRATION RATE: 18 BRPM | TEMPERATURE: 97.7 F | WEIGHT: 160 LBS | HEIGHT: 65 IN | HEART RATE: 69 BPM | DIASTOLIC BLOOD PRESSURE: 69 MMHG | SYSTOLIC BLOOD PRESSURE: 118 MMHG | OXYGEN SATURATION: 95 %

## 2019-05-02 DIAGNOSIS — I10 ESSENTIAL HYPERTENSION: ICD-10-CM

## 2019-05-02 DIAGNOSIS — I48.0 PAROXYSMAL ATRIAL FIBRILLATION (HCC): Primary | ICD-10-CM

## 2019-05-02 DIAGNOSIS — R00.1 BRADYCARDIA: ICD-10-CM

## 2019-05-02 PROCEDURE — 93280 PM DEVICE PROGR EVAL DUAL: CPT | Performed by: PHYSICIAN ASSISTANT

## 2019-05-02 PROCEDURE — 99214 OFFICE O/P EST MOD 30 MIN: CPT | Performed by: PHYSICIAN ASSISTANT

## 2019-05-02 RX ORDER — BUDESONIDE 0.5 MG/2ML
0.5 INHALANT ORAL DAILY PRN
COMMUNITY
End: 2020-02-19

## 2019-05-02 RX ORDER — PROPRANOLOL HYDROCHLORIDE 80 MG/1
80 CAPSULE, EXTENDED RELEASE ORAL 2 TIMES DAILY
Qty: 60 CAPSULE | Refills: 6 | Status: SHIPPED | OUTPATIENT
Start: 2019-05-02 | End: 2020-01-01

## 2019-05-02 RX ORDER — GUAIFENESIN 600 MG/1
1200 TABLET, EXTENDED RELEASE ORAL 2 TIMES DAILY
COMMUNITY
End: 2020-01-09

## 2019-05-02 NOTE — DISCHARGE INSTRUCTIONS
Continue usual medications.    Continue using the tramadol for her pain, alternating with Tylenol if needed.  Check blood pressure about every 8 hours, keep a record of results, and contact Dr. Martínez if continued difficulty with control.

## 2019-05-02 NOTE — PROGRESS NOTES
"[facility] at [facility]   OFFICE NOTE      Chary Boucher  11/18/1933  PCP: Saige Tobias MD    SUBJECTIVE:   Chary Boucher is a 85 y.o. female seen for a follow up visit regarding the following:     CC: Labile HTN  Problem List:   1. Coronary disease:  a. Left heart catheterization 2002, multi-vessel coronary disease/medical therapy.  b. Stress Cardiolite 2004, no ischemia.  c. Chest pain/left heart catheterization, December 2008, Dr. Mendoza revealing significant LV stenosis treated with drug eluting stent. Normal LV function/”spasm” right coronary artery (catheter induced).  d. Stress Cardiolite, 07/27/2010: Mild anterior ischemia with questionable artifact. Normal LV size and function.   e. Lexiscan Cardiolite, 03/25/2015 showing left ventricular ejection fraction 75%, no ischemia.   f. [facility] Dr Araujo with PTCA/stenting of LAD and LCFX with ESTEPHANIA 4/2017  2. Peripheral vascular disease:  a. Severe right common iliac stenosis.  b. Abdominal aortic aneurysm measuring 2 cm in March 2008.  c. Status post successful PTCA and placement of stent, December 2008.  d. Status-post PTCA and stenting of the right ICA, 09/25/2008.   3. Atrial fibrillation:  a. Tikosyn therapy 10/25/2007.  b. Medtronic dual chamber pacemaker.  c. Generator change ([employer]) 10/10/17  4. Essential Hypertension.  5. Dyslipidemia.  6. Degenerative joint disease/arthritis.  7. GERD.  8. Osteoporosis.  9. Abdominal aortic aneurysm:  a. Ultrasound of the abdomen on 03/18/2015 with size measured at 2.3 centimeters.      HPI:   The patient is a 85-year-old female brought to our office by her family members regarding recent labile hypertension which considered moderate to severe nature as well as history and treatment of atrial fibrillation and coronary disease.  The patient has had a couple falls where she states she \"loses her balance\".  She has broken her left arm which is in a cast currently.  She has been in the ER in " [place] following falls with a CT scan that was acceptable.  She recent went back to the ER 48 hours ago after her daughters noticed that in the past week her blood pressure has been extremely labile.  In the mornings her blood pressures 200 systolic.  Letter that feels her blood pressure drops to the 90s.  They are concerned that during this time she has some loss of cognitive ability she wants to sleep all the time.  They would like her medications adjusted to try to prevent these labile episodes.  The patient denies that she is having palpitations.  She denies any straightforward syncope events.  Her falls been secondary to mechanical or balance issues.  She denies any chest pain or chest tightness suggesting angina pectoris.  She does continue to follow Dr. Estes on a regular basis for history of blood pressure.        ROS:  Review of Symptoms:  General: + weakness  Skin: no rashes, lumps, or other skin changes  HEENT: + dizziness, lightheadedness, or vision changes  Respiratory: no cough or hemoptysis. On oxygen   Cardiovascular: no palpitations, and tachycardia  Gastrointestinal: no black/tarry stools or diarrhea  Urinary: no change in frequency or urgency  Peripheral Vascular: no claudication or leg cramps  Musculoskeletal: no muscle or joint pain/stiffness  Psychiatric: + depression  Neurological: no sensory or motor loss, no syncope  Hematologic: no anemia, easy bruising or bleeding  Endocrine: no thyroid problems, nor heat or cold intolerance    Cardiac PMH: (Old records have been reviewed and summarized below)      Past Medical History, Past Surgical History, Family history, Social History, and Medications were all reviewed with the patient today and updated as necessary.       Current Outpatient Medications:   •  apixaban (ELIQUIS) 2.5 MG tablet tablet, Take 2.5 mg by mouth 2 (Two) Times a Day., Disp: , Rfl:   •  atorvastatin (LIPITOR) 40 MG tablet, Take 40 mg by mouth Every Night., Disp: , Rfl:   •   budesonide (PULMICORT) 0.5 MG/2ML nebulizer solution, Take 0.5 mg by nebulization Daily As Needed., Disp: , Rfl:   •  busPIRone (BUSPAR) 5 MG tablet, Take 5 mg by mouth 2 (Two) Times a Day., Disp: , Rfl:   •  Calcium Carb-Cholecalciferol (CALCIUM 600 + D PO), Take 1 tablet by mouth Every Night., Disp: , Rfl:   •  CloNIDine (CATAPRES) 0.1 MG tablet, Take 0.1 mg by mouth Daily As Needed for High Blood Pressure (for SBP>170)., Disp: , Rfl:   •  dofetilide (TIKOSYN) 125 MCG capsule, Take 1 capsule by mouth 2 (Two) Times a Day., Disp: 180 capsule, Rfl: 2  •  escitalopram (LEXAPRO) 10 MG tablet, Take 10 mg by mouth Every Morning., Disp: , Rfl:   •  furosemide (LASIX) 40 MG tablet, Take 1 tablet by mouth Daily As Needed (lower extremity edema/SOB). Take once daily for 3 days as needed for swelling/SOB, Disp: 30 tablet, Rfl: 1  •  guaiFENesin (MUCINEX) 600 MG 12 hr tablet, Take 1,200 mg by mouth 2 (Two) Times a Day., Disp: , Rfl:   •  ipratropium-albuterol (DUO-NEB) 0.5-2.5 mg/3 ml nebulizer, Take 3 mL by nebulization Every 4 (Four) Hours As Needed for Wheezing., Disp: , Rfl:   •  isosorbide mononitrate (IMDUR) 30 MG 24 hr tablet, Take 30 mg by mouth Every Morning., Disp: , Rfl:   •  losartan (COZAAR) 50 MG tablet, Take 1 tablet by mouth Daily. (Patient taking differently: Take 100 mg by mouth Daily. 2 tablets at bedtime), Disp: 90 tablet, Rfl: 3  •  nitroglycerin (NITROSTAT) 0.4 MG SL tablet, Place 0.4 mg under the tongue Every 5 (Five) Minutes As Needed for Chest Pain. Take no more than 3 doses in 15 minutes., Disp: , Rfl:   •  O2 (OXYGEN), Inhale 2 L/min Daily., Disp: , Rfl:   •  omeprazole (PriLOSEC) 20 MG capsule, Take 20 mg by mouth Every Morning., Disp: , Rfl:   •  Probiotic Product ([facility]) capsule, Take 1 capsule by mouth Every Morning., Disp: , Rfl:   •  traMADol (ULTRAM) 50 MG tablet, Take  by mouth 3 (Three) Times a Day. TAKES 100 MG PO QAM, 100 MG PO AT 1500, AND 1 TAB PO QHS, Disp: , Rfl:   •   "propranolol LA (INDERAL LA) 80 MG 24 hr capsule, Take 1 capsule by mouth 2 (Two) Times a Day., Disp: 60 capsule, Rfl: 6  No current facility-administered medications for this visit.       Allergies   Allergen Reactions   • Adhesive Tape    • Celebrex [Celecoxib]      \"GI PAIN\"   • Epinephrine      \"SHAKES ALL OVER\"   • Keflex [Cephalexin]      \"UNKNOWN\"   • Niaspan [Niacin Er]      \"UNKNOWN\"   • Pacerone [Amiodarone] Hallucinations     Patient Active Problem List   Diagnosis   • Coronary disease   • Peripheral vascular disease (CMS/HCC)   • Atrial fibrillation (CMS/HCC)   • Hypertension   • Dyslipidemia   • GERD (gastroesophageal reflux disease)   • Osteoporosis   • Abdominal aortic aneurysm (CMS/[facility])   • Bradycardia   • Hypertensive heart disease   • TIA (transient ischemic attack)   • CVA (cerebral vascular accident) (CMS/[facility])   • Syncope   • COPD (chronic obstructive pulmonary disease) (CMS/[facility])   • Chest pain     Past Medical History:   Diagnosis Date   • Abdominal aortic aneurysm (CMS/[facility]) 10/5/2016    Ultrasound of the abdomen on 03/18/2015 with size measured at 2.3 centimeters.     • Bradycardia 10/5/2016   • Broken wrist 12/13/2018   • COPD (chronic obstructive pulmonary disease) (CMS/HCC) 10/5/2016   • Coronary disease 10/5/2016    Left heart catheterization 2002, multi-vessel coronary disease/medical therapy. Stress Cardiolite 2004, no ischemia. Chest pain/left heart catheterization, December 2008, Dr. Mendoza revealing significant LV stenosis treated with drug eluting stent.  Normal LV function/”spasm” right coronary artery (catheter induced). Stress Cardiolite, 07/27/2010: Mild anterior ischemia with questionable artifact. Normal LV size and function.   Lexiscan Cardiolite, 03/25/2015 showing left ventricular ejection fraction 75%, no ischemia.    • CVA (cerebral vascular accident) (CMS/HCC) 10/5/2016   • Degenerative joint disease 10/5/2016    Degenerative joint disease/arthritis   • Dyslipidemia " "10/5/2016   • GERD (gastroesophageal reflux disease) 10/5/2016   • Hypertension 10/5/2016   • Hypertensive heart disease 10/5/2016   • Osteoporosis 10/5/2016   • Peripheral vascular disease (CMS/HCC) 10/5/2016    Severe right common iliac stenosis. Abdominal aortic aneurysm measuring 2 cm in 2008. Status post successful PTCA and placement of stent, 2008. Status-post PTCA and stenting of the right ICA, 2008   • Syncope 10/5/2016   • TIA (transient ischemic attack) 10/5/2016     Past Surgical History:   Procedure Laterality Date   • APPENDECTOMY     • CARDIAC CATHETERIZATION N/A 2017    Procedure: Left Heart Cath;  Surgeon: Jesus Araujo MD;  Location:  [facility];  Service:    • CARDIAC ELECTROPHYSIOLOGY PROCEDURE N/A 10/10/2017    Procedure: PPM generator change - dual- MDT;  Surgeon: Ricardo Cross MD;  Location:  [facility] EP INVASIVE LOC[facility];  Service:    • CAROTID STENT Right    • CARPAL TUNNEL RELEASE Bilateral    • CATARACT EXTRACTION BILATERAL W/ ANTERIOR VITRECTOMY     • CHOLECYSTECTOMY     • CORONARY STENT PLACEMENT      TO THE LAD   • ILIAC ARTERY STENT     • KYPHOPLASTY     • PACEMAKER IMPLANTATION      PER DR. CROSS   • REPLACEMENT TOTAL KNEE BILATERAL      3-4 YEARS IN BETWEEN    • ROTATOR CUFF REPAIR Right    • TUBAL ABDOMINAL LIGATION       History reviewed. No pertinent family history.  Social History     Tobacco Use   • Smoking status: Former Smoker     Years: 15.00     Types: Cigarettes     Last attempt to quit: 1987     Years since quittin.3   • Smokeless tobacco: Never Used   • Tobacco comment: STATES QUIT SMOKING IN    Substance Use Topics   • Alcohol use: No         PHYSICAL EXAM:    BP 98/56 (BP Location: Right arm, Patient Position: Sitting)   Pulse 76   Ht 165.1 cm (65\")   Wt 72.6 kg (160 lb)   LMP  (LMP Unknown)   BMI 26.63 kg/m²        Wt Readings from Last 5 Encounters:   19 72.6 kg (160 lb)   19 72.6 kg " (160 lb)   02/14/19 71.6 kg (157 lb 12.8 oz)   01/17/19 68 kg (150 lb)   10/17/18 70.8 kg (156 lb)       BP Readings from Last 5 Encounters:   05/02/19 98/56   05/02/19 118/69   02/14/19 136/70   01/17/19 151/95   10/17/18 130/88       General appearance - Alert, well appearing, and in no distress on oxygne  Mental status - Affect appropriate to mood.  Eyes - Sclerae anicteric,  ENMT -decreasedl bilaterally, Dental hygiene good.  Neck - Carotids upstroke normal bilaterally, no bruits, no JVD.  Resp - decreased breath sounds  Heart - Normal rate, regular rhythm, normal S1, S2, no murmurs, rubs, clicks or gallops.  GI - Soft, nontender, nondistended, no masses or organomegaly.  Neurological - Grossly intact - normal speech, no focal findings  Extremities - Peripheral pulses normal, no pedal edema, no clubbing or cyanosis.  Skin - Normal coloration and turgor.  Psych -  oriented to person, place, and time.    Medical problems and test results were reviewed with the patient today.     Recent Results (from the past 672 hour(s))   Comprehensive Metabolic Panel    Collection Time: 05/01/19  2:57 PM   Result Value Ref Range    Glucose 106 (H) 65 - 99 mg/dL    BUN 22 8 - 23 mg/dL    Creatinine 1.14 (H) 0.57 - 1.00 mg/dL    Sodium 133 (L) 136 - 145 mmol/L    Potassium 4.7 3.5 - 5.2 mmol/L    Chloride 97 (L) 98 - 107 mmol/L    CO2 22.0 22.0 - 29.0 mmol/L    Calcium 10.3 8.6 - 10.5 mg/dL    Total Protein 8.0 6.0 - 8.5 g/dL    Albumin 3.60 3.50 - 5.20 g/dL    ALT (SGPT) 13 1 - 33 U/L    AST (SGOT) 19 1 - 32 U/L    Alkaline Phosphatase 95 39 - 117 U/L    Total Bilirubin 1.0 0.2 - 1.2 mg/dL    eGFR Non African Amer 45 (L) >60 mL/min/1.73    Globulin 4.4 gm/dL    A/G Ratio 0.8 g/dL    BUN/Creatinine Ratio 19.3 7.0 - 25.0    Anion Gap 14.0 mmol/L   Light Blue Top    Collection Time: 05/01/19  2:57 PM   Result Value Ref Range    Extra Tube hold for add-on    Green Top (Gel)    Collection Time: 05/01/19  2:57 PM   Result Value Ref  Range    Extra Tube Hold for add-ons.    Lavender Top    Collection Time: 05/01/19  2:57 PM   Result Value Ref Range    Extra Tube hold for add-on    Gold Top - SST    Collection Time: 05/01/19  2:57 PM   Result Value Ref Range    Extra Tube Hold for add-ons.    CBC Auto Differential    Collection Time: 05/01/19  2:57 PM   Result Value Ref Range    WBC 16.77 (H) 3.40 - 10.80 10*3/mm3    RBC 5.26 3.77 - 5.28 10*6/mm3    Hemoglobin 16.3 (H) 12.0 - 15.9 g/dL    Hematocrit 50.0 (H) 34.0 - 46.6 %    MCV 95.1 79.0 - 97.0 fL    MCH 31.0 26.6 - 33.0 pg    MCHC 32.6 31.5 - 35.7 g/dL    RDW 14.5 12.3 - 15.4 %    RDW-SD 50.8 37.0 - 54.0 fl    MPV 11.7 6.0 - 12.0 fL    Platelets 186 140 - 450 10*3/mm3    Neutrophil % 66.9 42.7 - 76.0 %    Lymphocyte % 20.6 19.6 - 45.3 %    Monocyte % 12.0 5.0 - 12.0 %    Eosinophil % 0.4 0.3 - 6.2 %    Basophil % 0.1 0.0 - 1.5 %    Immature Grans % 0.6 (H) 0.0 - 0.5 %    Neutrophils, Absolute 11.24 (H) 1.70 - 7.00 10*3/mm3    Lymphocytes, Absolute 3.45 (H) 0.70 - 3.10 10*3/mm3    Monocytes, Absolute 2.01 (H) 0.10 - 0.90 10*3/mm3    Eosinophils, Absolute 0.06 0.00 - 0.40 10*3/mm3    Basophils, Absolute 0.01 0.00 - 0.20 10*3/mm3    Immature Grans, Absolute 0.10 (H) 0.00 - 0.05 10*3/mm3   Urinalysis With Microscopic If Indicated (No Culture) - Urine, Clean Catch    Collection Time: 05/01/19  7:45 PM   Result Value Ref Range    Color, UA Yellow Yellow, Straw    Appearance, UA Clear Clear    pH, UA 6.0 5.0 - 8.0    Specific Gravity, UA 1.022 1.001 - 1.030    Glucose, UA Negative Negative    Ketones, UA Trace (A) Negative    Bilirubin, UA Negative Negative    Blood, UA Negative Negative    Protein,  mg/dL (2+) (A) Negative    Leuk Esterase, UA Trace (A) Negative    Nitrite, UA Negative Negative    Urobilinogen, UA 0.2 E.U./dL 0.2 - 1.0 E.U./dL   Urinalysis, Microscopic Only - Urine, Clean Catch    Collection Time: 05/01/19  7:45 PM   Result Value Ref Range    RBC, UA 0-2 None Seen, 0-2 /HPF     WBC, UA 3-5 (A) None Seen, 0-2 /HPF    Bacteria, UA None Seen None Seen, Trace /HPF    Squamous Epithelial Cells, UA 0-2 None Seen, 0-2 /HPF    Hyaline Casts, UA 0-6 0 - 6 /LPF    Methodology Automated Microscopy          EKG: (EKG has been independently visualized by me and summarized below)    ECG 12 Lead  Date/Time: 5/2/2019 5:18 PM  Performed by: Nino Espinosa PA  Authorized by: Nino Espinosa PA   Comparison: compared with previous ECG from 1/17/2019  Similar to previous ECG  Rhythm: sinus rhythm and paced  Rate: normal  Conduction: conduction normal  QRS axis: left  Other: no other findings    Clinical impression: abnormal EKG            Device Interrogation:  [employer] dual-chamber device.  The device interrogated Chinedu becker burden see if this correlates some of her symptoms.  She is a paced 9%.  RV paced 2.3%.  P wave 2.0 mV.  R wave 12.5 mV.  Atrial threshold 0.75 V at 0.5 mils 6.  RV threshold 1.0 V at 0.5 and 6.  Atrial beats for 30 ohms.  RV impedance 430 ohms.  DDDR at 60.  Battery voltage 3.01.  She had mode switch 7%.  She one episode of atrial fibrillatin lasting approxi-11 hours.    ASSESSMENT   1. Labile HTN: Her blood pressure in the morning hours over 200 systolic previous to medications.  However in the afternoons her blood pressure is decreased in the 90s systolic with some questionable cognitive changes.  We had a long discussion with the family and the patient at this point would like to try to change propranolol to 90 mg the morning and 90 mg in the evening hours.  We will try to adjust medications for more controlled blood pressure measurements.  2. PAF:  Continue Tikosyn.  EKG acceptable.  Her burden remains low at 7%.  She did have one day of A. fib lasting 11 hours.  But otherwise anticus and has worked well for her.  3. Anticoagulation: Continue Eliquis at 2.5 m twice daily.  4. CAD: No angina symptoms    PLAN  · Change propranolol LA to 90mg BID.  Continue to monitor  blood pressure closely  · Continue Tikosyn and Eliquis therapy  · Return for follow-up in 6 months or sooner as needed.    5/2/2019  5:14 PM    Will Jesús TAM

## 2019-06-20 ENCOUNTER — CLINICAL SUPPORT NO REQUIREMENTS (OUTPATIENT)
Dept: CARDIOLOGY | Facility: CLINIC | Age: 84
End: 2019-06-20

## 2019-06-20 DIAGNOSIS — R00.1 BRADYCARDIA: ICD-10-CM

## 2019-06-20 DIAGNOSIS — I48.0 PAROXYSMAL ATRIAL FIBRILLATION (HCC): Primary | ICD-10-CM

## 2019-06-20 PROCEDURE — 93296 REM INTERROG EVL PM/IDS: CPT | Performed by: INTERNAL MEDICINE

## 2019-06-20 PROCEDURE — 93294 REM INTERROG EVL PM/LDLS PM: CPT | Performed by: INTERNAL MEDICINE

## 2019-06-25 ENCOUNTER — TELEPHONE (OUTPATIENT)
Dept: CARDIOLOGY | Facility: CLINIC | Age: 84
End: 2019-06-25

## 2019-06-25 NOTE — TELEPHONE ENCOUNTER
I re-ordered Tikosyn through [employer] [facility]. It will arrive at the office in 7-10 days.

## 2019-07-01 ENCOUNTER — TELEPHONE (OUTPATIENT)
Dept: CARDIOLOGY | Facility: CLINIC | Age: 84
End: 2019-07-01

## 2019-09-25 ENCOUNTER — OFFICE VISIT (OUTPATIENT)
Dept: CARDIOLOGY | Facility: CLINIC | Age: 84
End: 2019-09-25

## 2019-09-25 VITALS
DIASTOLIC BLOOD PRESSURE: 70 MMHG | SYSTOLIC BLOOD PRESSURE: 120 MMHG | WEIGHT: 166 LBS | BODY MASS INDEX: 27.66 KG/M2 | OXYGEN SATURATION: 93 % | HEIGHT: 65 IN | HEART RATE: 70 BPM

## 2019-09-25 DIAGNOSIS — I25.10 CORONARY ARTERY DISEASE INVOLVING NATIVE CORONARY ARTERY OF NATIVE HEART WITHOUT ANGINA PECTORIS: ICD-10-CM

## 2019-09-25 DIAGNOSIS — I48.0 PAROXYSMAL ATRIAL FIBRILLATION (HCC): Primary | ICD-10-CM

## 2019-09-25 DIAGNOSIS — I10 ESSENTIAL HYPERTENSION: ICD-10-CM

## 2019-09-25 DIAGNOSIS — I49.5 SICK SINUS SYNDROME (HCC): ICD-10-CM

## 2019-09-25 PROCEDURE — 99214 OFFICE O/P EST MOD 30 MIN: CPT | Performed by: INTERNAL MEDICINE

## 2019-09-25 PROCEDURE — 93280 PM DEVICE PROGR EVAL DUAL: CPT | Performed by: INTERNAL MEDICINE

## 2019-09-25 NOTE — PROGRESS NOTES
"Chary Boucher  11/18/1933  [phone]    09/25/2019    [facility] CARDIOLOGY     Saige Tobias MD  [address] [place] [address]    Chief Complaint   Patient presents with   • Atrial Fibrillation       Problem List:   1. Coronary disease:  a. Left heart catheterization 2002, multi-vessel coronary disease/medical therapy.  b. Stress Cardiolite 2004, no ischemia.  c. Chest pain/left heart catheterization, December 2008, Dr. Mendoza revealing significant LV stenosis treated with drug eluting stent. Normal LV function/”spasm” right coronary artery (catheter induced).  d. Stress Cardiolite, 07/27/2010: Mild anterior ischemia with questionable artifact. Normal LV size and function.   e. Lexiscan Cardiolite, 03/25/2015 showing left ventricular ejection fraction 75%, no ischemia.   f. [facility] Dr Araujo with PTCA/stenting of LAD and LCFX with ESTEPHANIA 4/2017  g. Echocardiogram 8/22/19: EF 60-65%, no significant valvular abnormalities  2. Peripheral vascular disease:  a. Severe right common iliac stenosis.  b. Abdominal aortic aneurysm measuring 2 cm in March 2008.  c. Status post successful PTCA and placement of stent, December 2008.  d. Status-post PTCA and stenting of the right ICA, 09/25/2008.   3. Atrial fibrillation:  a. Tikosyn therapy 10/25/2007.  b. Medtronic dual chamber pacemaker.  c. Generator change ([employer]) 10/10/17  4. Essential Hypertension.  5. Dyslipidemia.  6. Degenerative joint disease/arthritis.  7. GERD.  8. Osteoporosis.  9. Abdominal aortic aneurysm:  a. Ultrasound of the abdomen on 03/18/2015 with size measured at 2.3 centimeters.  10. COPD/Pulmonary Fibrosis     Allergies  Allergies   Allergen Reactions   • Adhesive Tape    • Celebrex [Celecoxib]      \"GI PAIN\"   • Epinephrine      \"SHAKES ALL OVER\"   • Keflex [Cephalexin]      \"UNKNOWN\"   • Niaspan [Niacin Er]      \"UNKNOWN\"   • Pacerone [Amiodarone] Hallucinations       Current Medications    Current " Outpatient Medications:   •  apixaban (ELIQUIS) 2.5 MG tablet tablet, Take 2.5 mg by mouth 2 (Two) Times a Day., Disp: , Rfl:   •  atorvastatin (LIPITOR) 40 MG tablet, Take 40 mg by mouth Every Night., Disp: , Rfl:   •  budesonide (PULMICORT) 0.5 MG/2ML nebulizer solution, Take 0.5 mg by nebulization Daily As Needed., Disp: , Rfl:   •  busPIRone (BUSPAR) 5 MG tablet, Take 5 mg by mouth 2 (Two) Times a Day., Disp: , Rfl:   •  Calcium Carb-Cholecalciferol (CALCIUM 600 + D PO), Take 1 tablet by mouth Every Night., Disp: , Rfl:   •  CloNIDine (CATAPRES) 0.1 MG tablet, Take 0.1 mg by mouth Daily As Needed for High Blood Pressure (for SBP>170)., Disp: , Rfl:   •  dofetilide (TIKOSYN) 125 MCG capsule, Take 1 capsule by mouth 2 (Two) Times a Day., Disp: 180 capsule, Rfl: 2  •  escitalopram (LEXAPRO) 10 MG tablet, Take 10 mg by mouth Every Morning., Disp: , Rfl:   •  furosemide (LASIX) 40 MG tablet, Take 1 tablet by mouth Daily As Needed (lower extremity edema/SOB). Take once daily for 3 days as needed for swelling/SOB, Disp: 30 tablet, Rfl: 1  •  guaiFENesin (MUCINEX) 600 MG 12 hr tablet, Take 1,200 mg by mouth 2 (Two) Times a Day., Disp: , Rfl:   •  ipratropium-albuterol (DUO-NEB) 0.5-2.5 mg/3 ml nebulizer, Take 3 mL by nebulization Every 4 (Four) Hours As Needed for Wheezing., Disp: , Rfl:   •  isosorbide mononitrate (IMDUR) 30 MG 24 hr tablet, Take 30 mg by mouth Every Morning., Disp: , Rfl:   •  losartan (COZAAR) 50 MG tablet, Take 1 tablet by mouth Daily. (Patient taking differently: Take 100 mg by mouth Daily. 2 tablets at bedtime), Disp: 90 tablet, Rfl: 3  •  nitroglycerin (NITROSTAT) 0.4 MG SL tablet, Place 0.4 mg under the tongue Every 5 (Five) Minutes As Needed for Chest Pain. Take no more than 3 doses in 15 minutes., Disp: , Rfl:   •  O2 (OXYGEN), Inhale 2 L/min Daily., Disp: , Rfl:   •  omeprazole (PriLOSEC) 20 MG capsule, Take 20 mg by mouth Every Morning., Disp: , Rfl:   •  Probiotic Product ([employer]  "HEALTH) capsule, Take 1 capsule by mouth Every Morning., Disp: , Rfl:   •  propranolol LA (INDERAL LA) 80 MG 24 hr capsule, Take 1 capsule by mouth 2 (Two) Times a Day. (Patient taking differently: Take 120 mg by mouth 2 (Two) Times a Day.), Disp: 60 capsule, Rfl: 6  •  traMADol (ULTRAM) 50 MG tablet, Take 50 mg by mouth 3 (Three) Times a Day. TAKES 2 50MG TABLETS 3 TIMES DAILY, Disp: , Rfl:     History of Present Illness     Pt presents for follow up of atrial fibrillation on Tikosyn, SSS with PM check, and HTN. Since we last saw the pt, she has continued to feel poorly with SOB and significant fatigue. She has also had high BPs up to 190s and her Propanolol was increased 6 weeks ago, which has helped slightly with her BPs but not with her symptoms.  She is on chronic O2 for COPD which her daughters states has been stable with no increase in her O2 requirements. She had a recent echocardiogram which showed normal EF. She uses Lasix prn when she has fluid. She denies hospitalizations. She was in the ER for a fall a few months ago. Her daughters are very concerned about the way she feels.     ROS:  General:  + fatigue, + weight gain (12-13 lbs since 1/2019) - loss  Cardiovascular:  Denies CP, PND, syncope, near syncope, + edema + palpitations.  Pulmonary:  + DENNIS,-  cough, or wheezing      Vitals:    09/25/19 1311   BP: 120/70   BP Location: Right arm   Patient Position: Sitting   Pulse: 70   SpO2: 93%   Weight: 75.3 kg (166 lb)   Height: 165.1 cm (65\")     Body mass index is 27.62 kg/m².  PE:  General: NAD. A & O x 3. O2 NC present  Neck: no JVD, no carotid bruits, no TM  Heart RRR, NL S1, S2, S4 present, no rubs, murmurs  Lungs: CTA, no wheezes, rhonchi, or rales  Abd: soft, non-tender, NL BS  Ext: No musculoskeletal deformities, no edema, cyanosis, or clubbing  Psych: normal mood and affect    Diagnostic Data:    [employer] PM Manual Interrogation: normal function. 4.4% atrial fibrillation burden. 9.3-9.9 years on " battery.     ECG 12 Lead  Date/Time: 9/25/2019 2:07 PM  Performed by: Ricardo Cross MD  Authorized by: Ricardo Cross MD   Comparison: compared with previous ECG   Rhythm: sinus rhythm  BPM: 76  Other findings: left ventricular hypertrophy            1. Paroxysmal atrial fibrillation (CMS/HCC)    2. Coronary artery disease involving native coronary artery of native heart without angina pectoris    3. Sick sinus syndrome (CMS/HCC)    4. Essential hypertension          Plan:  1. PAF:  Continue Tikosyn.Overall well controlled all things considered  2. Anticoagulation: Continue Eliquis at 2.5 m twice daily.  3. CAD: No angina symptoms  4. SSS: normal PM function  5. HTN: not well controlled. Switch losartan 50 mg po bid.     F/up in 6 months    Scribed for Ricardo Cross MD by Bridget Kerr PA-C. 9/25/2019  1:56 PM     Ricardo PERAZA MD, personally performed the services described in this documentation as scribed by the above named individual in my presence, and it is both accurate and complete.  9/25/2019  2:04 PM

## 2019-10-21 ENCOUNTER — TELEPHONE (OUTPATIENT)
Dept: CARDIOLOGY | Facility: CLINIC | Age: 84
End: 2019-10-21

## 2019-10-21 NOTE — TELEPHONE ENCOUNTER
PT needs to get refill on Tikosyn through [employer] Pt assistance. I reordered through [employer] portal. Pt daughter is aware.

## 2019-10-23 ENCOUNTER — TELEPHONE (OUTPATIENT)
Dept: CARDIOLOGY | Facility: CLINIC | Age: 84
End: 2019-10-23

## 2019-10-23 NOTE — TELEPHONE ENCOUNTER
Patient's  notified and aware that her Tikosyn is here at the office ready to be picked up. I put it in the EP drawer.

## 2019-10-30 ENCOUNTER — TELEPHONE (OUTPATIENT)
Dept: CARDIOLOGY | Facility: CLINIC | Age: 84
End: 2019-10-30

## 2019-10-31 ENCOUNTER — CLINICAL SUPPORT NO REQUIREMENTS (OUTPATIENT)
Dept: CARDIOLOGY | Facility: CLINIC | Age: 84
End: 2019-10-31

## 2019-10-31 DIAGNOSIS — I48.0 PAROXYSMAL ATRIAL FIBRILLATION (HCC): ICD-10-CM

## 2019-10-31 PROCEDURE — 93294 REM INTERROG EVL PM/LDLS PM: CPT | Performed by: INTERNAL MEDICINE

## 2019-10-31 PROCEDURE — 93296 REM INTERROG EVL PM/IDS: CPT | Performed by: INTERNAL MEDICINE

## 2019-11-29 NOTE — TELEPHONE ENCOUNTER
Labs faxed over [facility] collected on 11/27/2019 scanned into  and ok'd by .   Patient called [facility] is in going to have daughter pick it up.

## 2019-12-26 ENCOUNTER — TELEPHONE (OUTPATIENT)
Dept: CARDIOLOGY | Facility: CLINIC | Age: 84
End: 2019-12-26

## 2019-12-26 NOTE — TELEPHONE ENCOUNTER
Pt daughter called pt needs refill on Tikosyn through [employer]. I informed her that they are no longer offering pt assistance and I gave her the number for Rx Outreach. She will call them and call us back if approved and we can send in a rx.

## 2020-01-01 ENCOUNTER — OFFICE VISIT (OUTPATIENT)
Dept: CARDIOLOGY | Facility: CLINIC | Age: 85
End: 2020-01-01

## 2020-01-01 ENCOUNTER — TELEPHONE (OUTPATIENT)
Dept: CARDIOLOGY | Facility: CLINIC | Age: 85
End: 2020-01-01

## 2020-01-01 ENCOUNTER — CONSULT (OUTPATIENT)
Dept: SLEEP MEDICINE | Facility: HOSPITAL | Age: 85
End: 2020-01-01

## 2020-01-01 VITALS
SYSTOLIC BLOOD PRESSURE: 163 MMHG | OXYGEN SATURATION: 90 % | HEART RATE: 82 BPM | HEIGHT: 65 IN | BODY MASS INDEX: 29.12 KG/M2 | DIASTOLIC BLOOD PRESSURE: 79 MMHG

## 2020-01-01 VITALS
WEIGHT: 175 LBS | HEIGHT: 65 IN | OXYGEN SATURATION: 94 % | DIASTOLIC BLOOD PRESSURE: 105 MMHG | HEART RATE: 82 BPM | SYSTOLIC BLOOD PRESSURE: 171 MMHG | BODY MASS INDEX: 29.16 KG/M2

## 2020-01-01 VITALS
DIASTOLIC BLOOD PRESSURE: 64 MMHG | HEIGHT: 65 IN | WEIGHT: 175 LBS | HEART RATE: 70 BPM | BODY MASS INDEX: 29.16 KG/M2 | TEMPERATURE: 96.6 F | SYSTOLIC BLOOD PRESSURE: 132 MMHG | OXYGEN SATURATION: 95 %

## 2020-01-01 VITALS
HEART RATE: 69 BPM | DIASTOLIC BLOOD PRESSURE: 85 MMHG | HEIGHT: 65 IN | OXYGEN SATURATION: 94 % | WEIGHT: 175 LBS | SYSTOLIC BLOOD PRESSURE: 150 MMHG | BODY MASS INDEX: 29.16 KG/M2

## 2020-01-01 DIAGNOSIS — I65.23 CAROTID STENOSIS, ASYMPTOMATIC, BILATERAL: ICD-10-CM

## 2020-01-01 DIAGNOSIS — I25.118 CORONARY ARTERY DISEASE OF NATIVE ARTERY OF NATIVE HEART WITH STABLE ANGINA PECTORIS (HCC): Primary | ICD-10-CM

## 2020-01-01 DIAGNOSIS — I25.118 CORONARY ARTERY DISEASE OF NATIVE ARTERY OF NATIVE HEART WITH STABLE ANGINA PECTORIS (HCC): ICD-10-CM

## 2020-01-01 DIAGNOSIS — R06.83 SNORING: ICD-10-CM

## 2020-01-01 DIAGNOSIS — I48.0 PAROXYSMAL ATRIAL FIBRILLATION (HCC): ICD-10-CM

## 2020-01-01 DIAGNOSIS — I10 ESSENTIAL HYPERTENSION: ICD-10-CM

## 2020-01-01 DIAGNOSIS — G47.39 SLEEP APNEA-LIKE BEHAVIOR: ICD-10-CM

## 2020-01-01 DIAGNOSIS — J44.9 CHRONIC OBSTRUCTIVE PULMONARY DISEASE, UNSPECIFIED COPD TYPE (HCC): Primary | ICD-10-CM

## 2020-01-01 DIAGNOSIS — I73.9 PERIPHERAL VASCULAR DISEASE (HCC): ICD-10-CM

## 2020-01-01 DIAGNOSIS — G47.39 SLEEP APNEA-LIKE BEHAVIOR: Primary | ICD-10-CM

## 2020-01-01 DIAGNOSIS — G47.19 EXCESSIVE DAYTIME SLEEPINESS: ICD-10-CM

## 2020-01-01 DIAGNOSIS — Z99.81 HYPOXEMIA REQUIRING SUPPLEMENTAL OXYGEN: ICD-10-CM

## 2020-01-01 DIAGNOSIS — G47.33 OSA (OBSTRUCTIVE SLEEP APNEA): ICD-10-CM

## 2020-01-01 DIAGNOSIS — R09.02 HYPOXEMIA REQUIRING SUPPLEMENTAL OXYGEN: ICD-10-CM

## 2020-01-01 DIAGNOSIS — I71.40 ABDOMINAL AORTIC ANEURYSM (AAA) WITHOUT RUPTURE (HCC): ICD-10-CM

## 2020-01-01 DIAGNOSIS — I10 ESSENTIAL HYPERTENSION: Primary | ICD-10-CM

## 2020-01-01 PROCEDURE — 99442 PR PHYS/QHP TELEPHONE EVALUATION 11-20 MIN: CPT | Performed by: INTERNAL MEDICINE

## 2020-01-01 PROCEDURE — 99204 OFFICE O/P NEW MOD 45 MIN: CPT | Performed by: INTERNAL MEDICINE

## 2020-01-01 PROCEDURE — 99214 OFFICE O/P EST MOD 30 MIN: CPT | Performed by: INTERNAL MEDICINE

## 2020-01-01 PROCEDURE — 93000 ELECTROCARDIOGRAM COMPLETE: CPT | Performed by: INTERNAL MEDICINE

## 2020-01-01 RX ORDER — MONTELUKAST SODIUM 10 MG/1
10 TABLET ORAL DAILY
COMMUNITY
Start: 2020-01-01

## 2020-01-01 RX ORDER — PROPRANOLOL HYDROCHLORIDE 120 MG/1
120 CAPSULE, EXTENDED RELEASE ORAL 2 TIMES DAILY
Qty: 60 CAPSULE | Refills: 11 | Status: SHIPPED | OUTPATIENT
Start: 2020-01-01 | End: 2021-01-01

## 2020-01-01 RX ORDER — PROPRANOLOL HYDROCHLORIDE 60 MG/1
TABLET ORAL
Qty: 90 TABLET | Refills: 5 | Status: SHIPPED | OUTPATIENT
Start: 2020-01-01 | End: 2020-01-01

## 2020-01-01 RX ORDER — PROPRANOLOL HYDROCHLORIDE 60 MG/1
120 TABLET ORAL 2 TIMES DAILY
Qty: 90 TABLET | Refills: 5
Start: 2020-01-01 | End: 2020-01-01

## 2020-01-01 RX ORDER — DOFETILIDE 0.12 MG/1
CAPSULE ORAL
Qty: 180 CAPSULE | Refills: 3 | Status: SHIPPED | OUTPATIENT
Start: 2020-01-01 | End: 2021-01-01

## 2020-01-01 RX ORDER — TIOTROPIUM BROMIDE AND OLODATEROL 3.124; 2.736 UG/1; UG/1
1 SPRAY, METERED RESPIRATORY (INHALATION)
COMMUNITY
End: 2021-01-01

## 2020-01-01 RX ORDER — HYDRALAZINE HYDROCHLORIDE 25 MG/1
25 TABLET, FILM COATED ORAL 2 TIMES DAILY
COMMUNITY
End: 2021-01-01

## 2020-01-07 RX ORDER — DOFETILIDE 0.12 MG/1
125 CAPSULE ORAL 2 TIMES DAILY
Qty: 180 CAPSULE | Refills: 3 | Status: SHIPPED | OUTPATIENT
Start: 2020-01-07 | End: 2020-01-13 | Stop reason: SDUPTHER

## 2020-01-09 ENCOUNTER — OFFICE VISIT (OUTPATIENT)
Dept: CARDIOLOGY | Facility: CLINIC | Age: 85
End: 2020-01-09

## 2020-01-09 VITALS
DIASTOLIC BLOOD PRESSURE: 64 MMHG | BODY MASS INDEX: 27 KG/M2 | OXYGEN SATURATION: 92 % | HEIGHT: 66 IN | SYSTOLIC BLOOD PRESSURE: 118 MMHG | WEIGHT: 168 LBS

## 2020-01-09 DIAGNOSIS — I25.10 CORONARY ARTERY DISEASE INVOLVING NATIVE CORONARY ARTERY OF NATIVE HEART WITHOUT ANGINA PECTORIS: ICD-10-CM

## 2020-01-09 DIAGNOSIS — I49.5 SICK SINUS SYNDROME (HCC): ICD-10-CM

## 2020-01-09 DIAGNOSIS — I48.0 PAROXYSMAL ATRIAL FIBRILLATION (HCC): ICD-10-CM

## 2020-01-09 DIAGNOSIS — I11.0 HYPERTENSIVE HEART DISEASE WITH CONGESTIVE HEART FAILURE, UNSPECIFIED HEART FAILURE TYPE (HCC): Primary | ICD-10-CM

## 2020-01-09 DIAGNOSIS — R00.1 BRADYCARDIA: ICD-10-CM

## 2020-01-09 PROCEDURE — 93280 PM DEVICE PROGR EVAL DUAL: CPT | Performed by: PHYSICIAN ASSISTANT

## 2020-01-09 PROCEDURE — 93000 ELECTROCARDIOGRAM COMPLETE: CPT | Performed by: PHYSICIAN ASSISTANT

## 2020-01-09 PROCEDURE — 99213 OFFICE O/P EST LOW 20 MIN: CPT | Performed by: PHYSICIAN ASSISTANT

## 2020-01-09 RX ORDER — PREDNISONE 10 MG/1
10 TABLET ORAL DAILY
COMMUNITY

## 2020-01-09 RX ORDER — ISOSORBIDE MONONITRATE 60 MG/1
60 TABLET, EXTENDED RELEASE ORAL EVERY MORNING
Qty: 30 TABLET | Refills: 11 | Status: SHIPPED | OUTPATIENT
Start: 2020-01-09 | End: 2020-02-26 | Stop reason: SDUPTHER

## 2020-01-09 RX ORDER — NITROGLYCERIN 0.4 MG/1
0.4 TABLET SUBLINGUAL
Qty: 30 TABLET | Refills: 6 | Status: SHIPPED | OUTPATIENT
Start: 2020-01-09

## 2020-01-09 NOTE — PROGRESS NOTES
[facility]   OFFICE NOTE      Chary Boucher  11/18/1933  PCP: Saige Tobias MD    SUBJECTIVE:   Chary Boucher is a 86 y.o. female seen for a follow up visit regarding the following:     CC:Afib    HPI:   Pleasant 86-year-old female presents today for follow-up regarding coronary disease, atrial fibrillation, Tikosyn therapy and [employer] pacemaker.  Since last office visit the patient has had steady decline in overall functionality.  She also had a bout of Bell's palsy requiring prednisone treatment of the strep because she also had COPD exacerbation and required antibiotics.  Recently the past week she has had some chest pain in the early morning hours she is taken sublingual nitroglycerin with relief.  She denies any ongoing chest pain lasting more than a few minutes or worsening heart failure symptoms or significant palpitations.    Cardiac PMH: (Old records have been reviewed and summarized below)  1. Coronary disease:  a. Left heart catheterization 2002, multi-vessel coronary disease/medical therapy.  b. Stress Cardiolite 2004, no ischemia.  c. Chest pain/left heart catheterization, December 2008, Dr. Mendoza revealing significant LV stenosis treated with drug eluting stent. Normal LV function/”spasm” right coronary artery (catheter induced).  d. Stress Cardiolite, 07/27/2010: Mild anterior ischemia with questionable artifact. Normal LV size and function.   e. Lexiscan Cardiolite, 03/25/2015 showing left ventricular ejection fraction 75%, no ischemia.   f. [facility] Dr Araujo with PTCA/stenting of LAD and LCFX with ESTEPHANIA 4/2017  g. Echocardiogram 8/22/19: EF 60-65%, no significant valvular abnormalities  2. Peripheral vascular disease:  a. Severe right common iliac stenosis.  b. Abdominal aortic aneurysm measuring 2 cm in March 2008.  c. Status post successful PTCA and placement of stent, December 2008.  d. Status-post PTCA and stenting of the right ICA, 09/25/2008.    3. Atrial fibrillation:  a. Tikosyn therapy 10/25/2007.  b. Medtronic dual chamber pacemaker.  c. Generator change ([employer]) 10/10/17  4. Essential Hypertension.  5. Dyslipidemia.  6. Degenerative joint disease/arthritis.  7. GERD.  8. Osteoporosis.  9. Abdominal aortic aneurysm:  a. Ultrasound of the abdomen on 03/18/2015 with size measured at 2.3 centimeters.  10. COPD/Pulmonary Fibrosis    Past Medical History, Past Surgical History, Family history, Social History, and Medications were all reviewed with the patient today and updated as necessary.       Current Outpatient Medications:   •  apixaban (ELIQUIS) 2.5 MG tablet tablet, Take 2.5 mg by mouth 2 (Two) Times a Day., Disp: , Rfl:   •  atorvastatin (LIPITOR) 40 MG tablet, Take 40 mg by mouth Every Night., Disp: , Rfl:   •  budesonide (PULMICORT) 0.5 MG/2ML nebulizer solution, Take 0.5 mg by nebulization Daily As Needed., Disp: , Rfl:   •  busPIRone (BUSPAR) 5 MG tablet, Take 5 mg by mouth 2 (Two) Times a Day., Disp: , Rfl:   •  Calcium Carb-Cholecalciferol (CALCIUM 600 + D PO), Take 1 tablet by mouth Every Night., Disp: , Rfl:   •  CloNIDine (CATAPRES) 0.1 MG tablet, Take 0.1 mg by mouth Daily As Needed for High Blood Pressure (for SBP>170)., Disp: , Rfl:   •  dofetilide (TIKOSYN) 125 MCG capsule, Take 1 capsule by mouth 2 (Two) Times a Day., Disp: 180 capsule, Rfl: 3  •  escitalopram (LEXAPRO) 10 MG tablet, Take 10 mg by mouth Every Morning., Disp: , Rfl:   •  furosemide (LASIX) 40 MG tablet, Take 1 tablet by mouth Daily As Needed (lower extremity edema/SOB). Take once daily for 3 days as needed for swelling/SOB, Disp: 30 tablet, Rfl: 1  •  ipratropium-albuterol (DUO-NEB) 0.5-2.5 mg/3 ml nebulizer, Take 3 mL by nebulization Every 4 (Four) Hours As Needed for Wheezing., Disp: , Rfl:   •  isosorbide mononitrate (IMDUR) 30 MG 24 hr tablet, Take 30 mg by mouth Every Morning., Disp: , Rfl:   •  losartan (COZAAR) 50 MG tablet, Take 1 tablet by mouth Daily.  "(Patient taking differently: Take 100 mg by mouth Daily. 2 tablets at bedtime), Disp: 90 tablet, Rfl: 3  •  nitroglycerin (NITROSTAT) 0.4 MG SL tablet, Place 1 tablet under the tongue Every 5 (Five) Minutes As Needed for Chest Pain. Take no more than 3 doses in 15 minutes., Disp: 30 tablet, Rfl: 6  •  O2 (OXYGEN), Inhale 2 L/min Daily., Disp: , Rfl:   •  omeprazole (PriLOSEC) 20 MG capsule, Take 20 mg by mouth Every Morning., Disp: , Rfl:   •  predniSONE (DELTASONE) 10 MG tablet, Take 1 mg by mouth Daily., Disp: , Rfl:   •  Probiotic Product (PadSquad) capsule, Take 1 capsule by mouth Every Morning., Disp: , Rfl:   •  propranolol LA (INDERAL LA) 80 MG 24 hr capsule, Take 1 capsule by mouth 2 (Two) Times a Day. (Patient taking differently: Take 120 mg by mouth 2 (Two) Times a Day.), Disp: 60 capsule, Rfl: 6  •  traMADol (ULTRAM) 50 MG tablet, Take 50 mg by mouth 3 (Three) Times a Day. TAKES 2 50MG TABLETS 3 TIMES DAILY, Disp: , Rfl:       Allergies   Allergen Reactions   • Adhesive Tape    • Epinephrine      \"SHAKES ALL OVER\"   • Keflex [Cephalexin]      \"UNKNOWN\"   • Niaspan [Niacin Er]      \"UNKNOWN\"   • Pacerone [Amiodarone] Hallucinations   • Celebrex [Celecoxib] GI Intolerance     \"GI PAIN\"     Patient Active Problem List   Diagnosis   • Coronary disease   • Peripheral vascular disease (CMS/HCC)   • Atrial fibrillation (CMS/HCC)   • Hypertension   • Dyslipidemia   • GERD (gastroesophageal reflux disease)   • Osteoporosis   • Abdominal aortic aneurysm (CMS/HCC)   • Bradycardia   • Hypertensive heart disease   • TIA (transient ischemic attack)   • CVA (cerebral vascular accident) (CMS/HCC)   • Syncope   • COPD (chronic obstructive pulmonary disease) (CMS/HCC)   • Chest pain   • Sick sinus syndrome (CMS/HCC)     Past Medical History:   Diagnosis Date   • Abdominal aortic aneurysm (CMS/HCC) 10/5/2016    Ultrasound of the abdomen on 03/18/2015 with size measured at 2.3 centimeters.     • Bradycardia " 10/5/2016   • Broken wrist 12/13/2018   • COPD (chronic obstructive pulmonary disease) (CMS/[facility]) 10/5/2016   • Coronary disease 10/5/2016    Left heart catheterization 2002, multi-vessel coronary disease/medical therapy. Stress Cardiolite 2004, no ischemia. Chest pain/left heart catheterization, December 2008, Dr. Mendoza revealing significant LV stenosis treated with drug eluting stent.  Normal LV function/”spasm” right coronary artery (catheter induced). Stress Cardiolite, 07/27/2010: Mild anterior ischemia with questionable artifact. Normal LV size and function.   Lexiscan Cardiolite, 03/25/2015 showing left ventricular ejection fraction 75%, no ischemia.    • CVA (cerebral vascular accident) (CMS/[facility]) 10/5/2016   • Degenerative joint disease 10/5/2016    Degenerative joint disease/arthritis   • Dyslipidemia 10/5/2016   • GERD (gastroesophageal reflux disease) 10/5/2016   • Hypertension 10/5/2016   • Hypertensive heart disease 10/5/2016   • Osteoporosis 10/5/2016   • Peripheral vascular disease (CMS/[facility]) 10/5/2016    Severe right common iliac stenosis. Abdominal aortic aneurysm measuring 2 cm in March 2008. Status post successful PTCA and placement of stent, December 2008. Status-post PTCA and stenting of the right ICA, 09/25/2008   • Syncope 10/5/2016   • TIA (transient ischemic attack) 10/5/2016     Past Surgical History:   Procedure Laterality Date   • APPENDECTOMY     • CARDIAC CATHETERIZATION N/A 4/26/2017    Procedure: Left Heart Cath;  Surgeon: Jesus Araujo MD;  Location:  [facility];  Service:    • CARDIAC ELECTROPHYSIOLOGY PROCEDURE N/A 10/10/2017    Procedure: PPM generator change - dual- MDT;  Surgeon: Ricardo Cross MD;  Location:  [facility];  Service:    • CAROTID STENT Right    • CARPAL TUNNEL RELEASE Bilateral    • CATARACT EXTRACTION BILATERAL W/ ANTERIOR VITRECTOMY     • CHOLECYSTECTOMY     • CORONARY STENT PLACEMENT      TO THE LAD   • ILIAC ARTERY STENT  "    • KYPHOPLASTY     • PACEMAKER IMPLANTATION  2008    PER DR. BASSETT   • REPLACEMENT TOTAL KNEE BILATERAL      3-4 YEARS IN BETWEEN    • ROTATOR CUFF REPAIR Right    • TUBAL ABDOMINAL LIGATION       History reviewed. No pertinent family history.  Social History     Tobacco Use   • Smoking status: Former Smoker     Years: 15.00     Types: Cigarettes     Last attempt to quit: 1987     Years since quittin.0   • Smokeless tobacco: Never Used   • Tobacco comment: STATES QUIT SMOKING IN    Substance Use Topics   • Alcohol use: No       ROS:  Review of Symptoms:  General: no recent weight loss/gain, weakness or fatigue  Skin: no rashes, lumps, or other skin changes  HEENT: no dizziness, lightheadedness, or vision changes  Respiratory: + COPD  Cardiovascular: + palpitations  Gastrointestinal: no black/tarry stools or diarrhea  Urinary: no change in frequency or urgency  Peripheral Vascular: no claudication or leg cramps  Musculoskeletal: no muscle or joint pain/stiffness  Psychiatric: no depression or excessive stress  Neurological: no sensory or motor loss, no syncope. + [place] palsy.   Hematologic: no anemia, easy bruising or bleeding  Endocrine: no thyroid problems, nor heat or cold intolerance    PHYSICAL EXAM:    /64 (BP Location: Left arm, Patient Position: Sitting)   Ht 167.6 cm (66\")   Wt 76.2 kg (168 lb)   LMP  (LMP Unknown)   SpO2 92%   BMI 27.12 kg/m²        Wt Readings from Last 5 Encounters:   20 76.2 kg (168 lb)   19 75.3 kg (166 lb)   19 72.6 kg (160 lb)   19 72.6 kg (160 lb)   19 71.6 kg (157 lb 12.8 oz)       BP Readings from Last 5 Encounters:   20 118/64   19 120/70   19 98/56   19 118/69   19 136/70       General appearance - Alert, well appearing, and in no distress   Mental status - Affect appropriate to mood.  Eyes - Sclerae anicteric,  ENMT - Hearing grossly normal bilaterally, Dental hygiene good.  Neck - " Carotids upstroke normal bilaterally, no bruits, no JVD.  Resp - Clear to auscultation, no wheezes, rales or rhonchi, symmetric air entry.  Heart - Normal rate, regular rhythm, normal S1, S2, no murmurs, rubs, clicks or gallops.  GI - Soft, nontender, nondistended, no masses or organomegaly.  Neurological - Grossly intact - normal speech, no focal findings  Musculoskeletal - No joint tenderness, deformity or swelling, no muscular tenderness noted.  Extremities - Peripheral pulses normal, no pedal edema, no clubbing or cyanosis.  Skin - Normal coloration and turgor.  Psych -  oriented to person, place, and time.    Medical problems and test results were reviewed with the patient today.     No results found for this or any previous visit (from the past 672 hour(s)).      EKG: (EKG has been independently visualized by me and summarized below)    ECG 12 Lead  Date/Time: 1/9/2020 3:49 PM  Performed by: Nino Espinosa PA  Authorized by: Nino Espinosa PA   Comparison: compared with previous ECG from 9/6/2019  Similar to previous ECG  Rhythm: sinus rhythm and paced  Rate: normal  Conduction: conduction normal  QRS axis: normal    Clinical impression: abnormal EKG            Device Interrogation:  Please see device interrogation form which has been signed and updated for full details. [employer] DDDR rate 60.  Apaced 90% and RV paced 2.2%.  Acceptable sensivity and thresholds. Battery voltage 2.99v. 9.5 years.   6.6% Aifb. Longest episode of 4.5 years.     ASSESSMENT   1. PAF: Tikosyn, Stable EKG.   2. CAD: class II-III angina symptoms   3. SSS: PPM, Normal function.   4. HTN: BB, Cozaar, imdur   5. Carotid disease, RICARDO stent 2008, followed by Dr. Farnsworth.   6. COPD, Nebs, Oxygen recent steroid use. Followed by Pulmonary.     PLAN  · Aspirin 81 mg daily  · Increase Imdur to 60 mg daily if no improvement in symptoms will consider Ranexa 500 mg twice daily  · Return follow-up Dr. Araujo in 1 month with   Tay in 6 months or sooner as needed.      1/9/2020  3:44 PM    Will Jesús TAM

## 2020-01-13 RX ORDER — DOFETILIDE 0.12 MG/1
125 CAPSULE ORAL 2 TIMES DAILY
Qty: 180 CAPSULE | Refills: 3 | Status: SHIPPED | OUTPATIENT
Start: 2020-01-13 | End: 2020-01-01

## 2020-01-20 ENCOUNTER — OFFICE VISIT (OUTPATIENT)
Dept: NEUROSURGERY | Facility: CLINIC | Age: 85
End: 2020-01-20

## 2020-01-20 VITALS
TEMPERATURE: 98.9 F | BODY MASS INDEX: 26.68 KG/M2 | SYSTOLIC BLOOD PRESSURE: 132 MMHG | DIASTOLIC BLOOD PRESSURE: 90 MMHG | HEIGHT: 66 IN | WEIGHT: 166 LBS

## 2020-01-20 DIAGNOSIS — M53.9 MULTILEVEL DEGENERATIVE DISC DISEASE: Primary | ICD-10-CM

## 2020-01-20 DIAGNOSIS — I65.23 BILATERAL CAROTID ARTERY STENOSIS: ICD-10-CM

## 2020-01-20 PROCEDURE — 99203 OFFICE O/P NEW LOW 30 MIN: CPT | Performed by: NEUROLOGICAL SURGERY

## 2020-01-20 NOTE — PROGRESS NOTES
Chary Boucher  11/18/1933  [id number]      Chief Complaint   Patient presents with   • carotid stenosis       HISTORY OF PRESENT ILLNESS: This is an 86-year-old female who is known to my service after having carotid revascularization several years ago who is referred for an opinion in reference to a 70 and 50% stenosis of the internal carotid arteries.  The issues that she has are primarily progressive unsteadiness, generalized weakness, and tremor.  These are well outlined in her medical record.  She is being cared for by neurology and at [facility].  She has been in a wheelchair for the past year.    Past Medical History:   Diagnosis Date   • Abdominal aortic aneurysm (CMS/[facility]) 10/5/2016    Ultrasound of the abdomen on 03/18/2015 with size measured at 2.3 centimeters.     • Bradycardia 10/5/2016   • Broken wrist 12/13/2018   • COPD (chronic obstructive pulmonary disease) (CMS/[facility]) 10/5/2016   • Coronary disease 10/5/2016    Left heart catheterization 2002, multi-vessel coronary disease/medical therapy. Stress Cardiolite 2004, no ischemia. Chest pain/left heart catheterization, December 2008, Dr. Mendoza revealing significant LV stenosis treated with drug eluting stent.  Normal LV function/”spasm” right coronary artery (catheter induced). Stress Cardiolite, 07/27/2010: Mild anterior ischemia with questionable artifact. Normal LV size and function.   Lexiscan Cardiolite, 03/25/2015 showing left ventricular ejection fraction 75%, no ischemia.    • CVA (cerebral vascular accident) (CMS/[facility]) 10/5/2016   • Degenerative joint disease 10/5/2016    Degenerative joint disease/arthritis   • Dyslipidemia 10/5/2016   • GERD (gastroesophageal reflux disease) 10/5/2016   • Hypertension 10/5/2016   • Hypertensive heart disease 10/5/2016   • Osteoporosis 10/5/2016   • Peripheral vascular disease (CMS/[facility]) 10/5/2016    Severe right common iliac stenosis. Abdominal aortic aneurysm measuring 2 cm in March 2008. Status  "post successful PTCA and placement of stent, 2008. Status-post PTCA and stenting of the right ICA, 2008   • Syncope 10/5/2016   • TIA (transient ischemic attack) 10/5/2016       Past Surgical History:   Procedure Laterality Date   • APPENDECTOMY     • CARDIAC CATHETERIZATION N/A 2017    Procedure: Left Heart Cath;  Surgeon: Jseus Araujo MD;  Location:  [facility];  Service:    • CARDIAC ELECTROPHYSIOLOGY PROCEDURE N/A 10/10/2017    Procedure: PPM generator change - dual- MDT;  Surgeon: Ricardo Cross MD;  Location:  [facility];  Service:    • CAROTID STENT Right    • CARPAL TUNNEL RELEASE Bilateral    • CATARACT EXTRACTION BILATERAL W/ ANTERIOR VITRECTOMY     • CHOLECYSTECTOMY     • CORONARY STENT PLACEMENT      TO THE LAD   • ILIAC ARTERY STENT     • KYPHOPLASTY     • PACEMAKER IMPLANTATION      PER DR. CROSS   • REPLACEMENT TOTAL KNEE BILATERAL      3-4 YEARS IN BETWEEN    • ROTATOR CUFF REPAIR Right    • TUBAL ABDOMINAL LIGATION         No family history on file.    Social History     Socioeconomic History   • Marital status:      Spouse name: Not on file   • Number of children: Not on file   • Years of education: Not on file   • Highest education level: Not on file   Tobacco Use   • Smoking status: Former Smoker     Years: 15.00     Types: Cigarettes     Last attempt to quit: 1987     Years since quittin.0   • Smokeless tobacco: Never Used   • Tobacco comment: STATES QUIT SMOKING IN    Substance and Sexual Activity   • Alcohol use: No   • Drug use: No   • Sexual activity: Defer       Allergies   Allergen Reactions   • Adhesive Tape    • Epinephrine      \"SHAKES ALL OVER\"   • Keflex [Cephalexin]      \"UNKNOWN\"   • Niaspan [Niacin Er]      \"UNKNOWN\"   • Pacerone [Amiodarone] Hallucinations   • Celebrex [Celecoxib] GI Intolerance     \"GI PAIN\"         Current Outpatient Medications:   •  apixaban (ELIQUIS) 2.5 MG tablet tablet, " Take 2.5 mg by mouth 2 (Two) Times a Day., Disp: , Rfl:   •  atorvastatin (LIPITOR) 40 MG tablet, Take 40 mg by mouth Every Night., Disp: , Rfl:   •  budesonide (PULMICORT) 0.5 MG/2ML nebulizer solution, Take 0.5 mg by nebulization Daily As Needed., Disp: , Rfl:   •  busPIRone (BUSPAR) 5 MG tablet, Take 5 mg by mouth 2 (Two) Times a Day., Disp: , Rfl:   •  Calcium Carb-Cholecalciferol (CALCIUM 600 + D PO), Take 1 tablet by mouth Every Night., Disp: , Rfl:   •  CloNIDine (CATAPRES) 0.1 MG tablet, Take 0.1 mg by mouth Daily As Needed for High Blood Pressure (for SBP>170)., Disp: , Rfl:   •  dofetilide (TIKOSYN) 125 MCG capsule, Take 1 capsule by mouth 2 (Two) Times a Day., Disp: 180 capsule, Rfl: 3  •  escitalopram (LEXAPRO) 10 MG tablet, Take 10 mg by mouth Every Morning., Disp: , Rfl:   •  furosemide (LASIX) 40 MG tablet, Take 1 tablet by mouth Daily As Needed (lower extremity edema/SOB). Take once daily for 3 days as needed for swelling/SOB, Disp: 30 tablet, Rfl: 1  •  ipratropium-albuterol (DUO-NEB) 0.5-2.5 mg/3 ml nebulizer, Take 3 mL by nebulization Every 4 (Four) Hours As Needed for Wheezing., Disp: , Rfl:   •  isosorbide mononitrate (IMDUR) 60 MG 24 hr tablet, Take 1 tablet by mouth Every Morning., Disp: 30 tablet, Rfl: 11  •  losartan (COZAAR) 50 MG tablet, Take 1 tablet by mouth Daily. (Patient taking differently: Take 100 mg by mouth Daily. 2 tablets at bedtime), Disp: 90 tablet, Rfl: 3  •  nitroglycerin (NITROSTAT) 0.4 MG SL tablet, Place 1 tablet under the tongue Every 5 (Five) Minutes As Needed for Chest Pain. Take no more than 3 doses in 15 minutes., Disp: 30 tablet, Rfl: 6  •  O2 (OXYGEN), Inhale 2 L/min Daily., Disp: , Rfl:   •  omeprazole (PriLOSEC) 20 MG capsule, Take 20 mg by mouth Every Morning., Disp: , Rfl:   •  predniSONE (DELTASONE) 10 MG tablet, Take 1 mg by mouth Daily., Disp: , Rfl:   •  Probiotic Product (Newsreps) capsule, Take 1 capsule by mouth Every Morning., Disp: , Rfl:    •  propranolol LA (INDERAL LA) 80 MG 24 hr capsule, Take 1 capsule by mouth 2 (Two) Times a Day. (Patient taking differently: Take 120 mg by mouth 2 (Two) Times a Day.), Disp: 60 capsule, Rfl: 6  •  traMADol (ULTRAM) 50 MG tablet, Take 50 mg by mouth 3 (Three) Times a Day. TAKES 2 50MG TABLETS 3 TIMES DAILY, Disp: , Rfl:     Review of Systems   Constitutional: Positive for fatigue. Negative for activity change, appetite change, chills, diaphoresis, fever and unexpected weight change.   HENT: Negative for congestion, dental problem, drooling, ear discharge, ear pain, facial swelling, hearing loss, mouth sores, nosebleeds, postnasal drip, rhinorrhea, sinus pressure, sneezing, sore throat, tinnitus, trouble swallowing and voice change.    Eyes: Negative for photophobia, pain, discharge, redness, itching and visual disturbance.   Respiratory: Positive for shortness of breath. Negative for apnea, cough, choking, chest tightness, wheezing and stridor.    Cardiovascular: Positive for palpitations. Negative for chest pain and leg swelling.   Gastrointestinal: Negative for abdominal distention, abdominal pain, anal bleeding, blood in stool, constipation, diarrhea, nausea, rectal pain and vomiting.   Endocrine: Positive for polydipsia. Negative for cold intolerance, heat intolerance, polyphagia and polyuria.   Genitourinary: Positive for frequency. Negative for decreased urine volume, difficulty urinating, dysuria, enuresis, flank pain, genital sores, hematuria and urgency.   Musculoskeletal: Positive for gait problem. Negative for arthralgias, back pain, joint swelling, myalgias, neck pain and neck stiffness.   Skin: Negative for color change, pallor, rash and wound.   Allergic/Immunologic: Negative for environmental allergies, food allergies and immunocompromised state.   Neurological: Positive for tremors and weakness. Negative for dizziness, seizures, syncope, facial asymmetry, speech difficulty, light-headedness,  "numbness and headaches.   Hematological: Negative for adenopathy. Does not bruise/bleed easily.   Psychiatric/Behavioral: Positive for decreased concentration and dysphoric mood. Negative for agitation, behavioral problems, confusion, hallucinations, self-injury, sleep disturbance and suicidal ideas. The patient is nervous/anxious. The patient is not hyperactive.    All other systems reviewed and are negative.      Vitals:    01/20/20 1246   BP: 132/90   BP Location: Right arm   Patient Position: Sitting   Cuff Size: Adult   Temp: 98.9 °F (37.2 °C)   TempSrc: Temporal   Weight: 75.3 kg (166 lb)   Height: 167.6 cm (66\")       Neurological Examination:  Mental status/speech: The patient is alert and oriented.  Speech is clear without aphysia or dysarthria.  No overt cognitive deficits.    Cranial nerve examination:    Olfaction: Smell is intact.  Vision: Vision is intact without visual field abnormalities.  Funduscopic examination is normal.  No pupillary irregularity.  Ocular motor examination: The extraocular muscles are intact.  There is no diplopia.  The pupil is round and reactive to both light and accommodation.  There is no nystagmus.  Facial movement/sensation: There is no facial weakness.  Sensation is intact in the first, second, and third divisions of the trigeminal nerve.  The corneal reflex is intact.  Auditory: Hearing is intact to finger rub bilaterally.  Cranial nerves IX, X, XI, XII: Phonation is normal.  No dysphagia.  Tongue is protruded in the midline without atrophy.  The gag reflex is intact.  Shoulder shrug is normal.    Musculoligamentous ligamentous examination: She has a non-intention tremor more so on the right than the left.  She has a mild right hemiparesis with hyperreflexia.  Her gait was not tested.  Her strength appears to be intact however.  Finger-to-nose is slightly dysmetric.    Medical Decision Making:     Diagnostic Data Set: I have reviewed the CT data set of the brain, " cervical, lumbar and CTA.  She has generalized significant degenerative osteoarthritis through the cervical lumbar area; she has mild cerebral atrophy in the left parietal occipital cortex and has calcified artery with stenosis of both the right and left internal carotid artery      Assessment: Asymptomatic carotid stenosis, degenerative osteoarthritis, nonintentional tremor          Recommendations: She does not require carotid revascularization.  That is not going to solve the issues that she has at this time.  Unfortunately I do not think there is a good answer for her in the context of increasing her mobility.  She has rather significant degenerative osteoarthritis.  Surgery is not a remedy.  I discussed this in detail with Chary and her family.  I am happy to help in any way I can otherwise.        I greatly appreciate the opportunity to see and evaluate this individual.  If you have questions or concerns regarding issues that I may have overlooked please call me at any time: [phone].  Arcadio Farnsworth M.D.  [facility]  [address] [place] [address]    Scribed for Nino Farnsworth MD by Opal Gonsalves CMA. 1/20/2020  2:00 PM    I have read and concur with the information provided by the scribe.  Nino Farnsworth MD

## 2020-01-23 ENCOUNTER — TELEPHONE (OUTPATIENT)
Dept: CARDIOLOGY | Facility: CLINIC | Age: 85
End: 2020-01-23

## 2020-01-23 NOTE — TELEPHONE ENCOUNTER
You saw the patient in clinic on 1/9/20 and started her on Imdur 60 mg daily. She said that for 2 weeks it did really help relieve her chest pain, but she did have to take one SL NTG yesterday. It did relieve the pain. She just wanted to make you aware of this occurrence in case you wanted to make any further medication adjustments.-+

## 2020-01-24 ENCOUNTER — TELEPHONE (OUTPATIENT)
Dept: CARDIOLOGY | Facility: CLINIC | Age: 85
End: 2020-01-24

## 2020-01-24 NOTE — TELEPHONE ENCOUNTER
----- Message from BRAEDEN Boo sent at 1/24/2020 10:48 AM EST -----  Continue IMDUR 60mg daily, if recurrent chest pain with more SL ntg  Use on a daily basis will consider increasing to 120mg Daily.

## 2020-01-30 ENCOUNTER — CLINICAL SUPPORT NO REQUIREMENTS (OUTPATIENT)
Dept: CARDIOLOGY | Facility: CLINIC | Age: 85
End: 2020-01-30

## 2020-01-30 DIAGNOSIS — I48.0 PAROXYSMAL ATRIAL FIBRILLATION (HCC): ICD-10-CM

## 2020-01-30 DIAGNOSIS — R00.1 BRADYCARDIA: ICD-10-CM

## 2020-01-30 PROCEDURE — 93296 REM INTERROG EVL PM/IDS: CPT | Performed by: INTERNAL MEDICINE

## 2020-01-30 PROCEDURE — 93294 REM INTERROG EVL PM/LDLS PM: CPT | Performed by: INTERNAL MEDICINE

## 2020-02-19 ENCOUNTER — OFFICE VISIT (OUTPATIENT)
Dept: CARDIOLOGY | Facility: CLINIC | Age: 85
End: 2020-02-19

## 2020-02-19 VITALS
SYSTOLIC BLOOD PRESSURE: 124 MMHG | HEART RATE: 78 BPM | DIASTOLIC BLOOD PRESSURE: 68 MMHG | WEIGHT: 168 LBS | BODY MASS INDEX: 27.99 KG/M2 | HEIGHT: 65 IN

## 2020-02-19 DIAGNOSIS — I65.23 CAROTID STENOSIS, ASYMPTOMATIC, BILATERAL: ICD-10-CM

## 2020-02-19 DIAGNOSIS — I10 ESSENTIAL HYPERTENSION: ICD-10-CM

## 2020-02-19 DIAGNOSIS — I25.118 CORONARY ARTERY DISEASE OF NATIVE ARTERY OF NATIVE HEART WITH STABLE ANGINA PECTORIS (HCC): ICD-10-CM

## 2020-02-19 DIAGNOSIS — I73.9 PERIPHERAL VASCULAR DISEASE (HCC): ICD-10-CM

## 2020-02-19 DIAGNOSIS — R07.2 PRECORDIAL CHEST PAIN: Primary | ICD-10-CM

## 2020-02-19 DIAGNOSIS — I11.0 HYPERTENSIVE HEART DISEASE WITH CONGESTIVE HEART FAILURE, UNSPECIFIED HEART FAILURE TYPE (HCC): ICD-10-CM

## 2020-02-19 DIAGNOSIS — R00.1 BRADYCARDIA: ICD-10-CM

## 2020-02-19 DIAGNOSIS — I49.5 SICK SINUS SYNDROME (HCC): ICD-10-CM

## 2020-02-19 PROCEDURE — 99215 OFFICE O/P EST HI 40 MIN: CPT | Performed by: INTERNAL MEDICINE

## 2020-02-19 PROCEDURE — 93000 ELECTROCARDIOGRAM COMPLETE: CPT | Performed by: INTERNAL MEDICINE

## 2020-02-19 RX ORDER — RANOLAZINE 500 MG/1
500 TABLET, EXTENDED RELEASE ORAL 2 TIMES DAILY
Qty: 60 TABLET | Refills: 11 | Status: SHIPPED | OUTPATIENT
Start: 2020-02-19 | End: 2020-04-02

## 2020-02-19 RX ORDER — ASPIRIN 81 MG/1
81 TABLET ORAL DAILY
COMMUNITY

## 2020-02-19 NOTE — PROGRESS NOTES
[facility] AT [address], [address]  [place], [place], [id number] [phone]  WWW.[facility]           OUTPATIENT CLINIC FOLLOW UP NOTE    Patient Care Team:  Patient Care Team:  Saige Tobias MD as PCP - General (Internal Medicine)  Nathanael Davis MD as PCP - Claims Attributed  Dex Kelly MD as Consulting Physician (Cardiology)  Ricardo Cross MD as Consulting Physician (Cardiology)    Subjective:      Chief Complaint   Patient presents with   • Hypertension   • Atrial Fibrillation       HPI:    Chary Boucher is a 86 y.o. female.  Cardiac problem list:  1. Coronary disease:  a. Left heart catheterization 2002, multi-vessel coronary disease/medical therapy.  b. Stress Cardiolite 2004, no ischemia.  c. Chest pain/left heart catheterization, December 2008, Dr. Mendoza revealing significant stenosis treated with drug eluting stent. Normal LV function/”spasm” right coronary artery (catheter induced).  d. Stress Cardiolite, 07/27/2010: Mild anterior ischemia with questionable artifact. Normal LV size and function.   e. Lexiscan Cardiolite, 03/25/2015 showing left ventricular ejection fraction 75%, no ischemia.   f. [facility] Dr Araujo with PTCA/stenting of LAD and LCFX with ESTEPHANIA 4/2017  2. Peripheral vascular disease:  a. Severe right common iliac stenosis.  b. Abdominal aortic aneurysm measuring 2 cm in March 2008.  c. Status post successful PTCA and placement of stent, December 2008.  3. Carotid stenosis: Status-post PTCA and stenting of the right ICA, 09/25/2008.   4. Atrial fibrillation:  a. Tikosyn therapy 10/25/2007.  b. Medtronic dual chamber pacemaker.  c. Generator change ([facility]) 10/10/17  5. Essential Hypertension.  6. Dyslipidemia.  7. Degenerative joint disease/arthritis.  8. GERD.  9. Osteoporosis.  10. Possible abdominal aortic aneurysm:  a. Ultrasound of the abdomen on 03/18/2015 with size measured at 2.3 centimeters.  11. COPD/Pulmonary  Fibrosis    Since her last visit the patient has had increasing frequency of chest pains.  Approximately 1-2 times per week.  Improved with sublingual nitro.  Feels like a dull ache in the center of the chest and at times can radiate to the jaw and left arm.  Last about 10 to 15 minutes.  Has been feeling weak, can only exert herself for about a minute before becoming tired with simple activities of daily living.  Has chronic tremor, dyspnea on home oxygen, back pains.  The symptoms seem to have progressed over the last couple months.    Follows with pulmonology in [place]    Seen by Dr. Farnsworth recently for carotid stenosis.  Had an ultrasound recently which showed moderate nonobstructive disease.    Her primary care physician is considering starting Namenda.  They are concerned about starting it while on Tikosyn    Does not have clear evidence of claudication but is not walking much.  Does have weakness in her right leg more than her left    Review of Systems:  Positive for chest pain, dyspnea, weakness, back pain, tremor  Negative for exertional chest pain, dyspnea with exertion, orthopnea, PND, lower extremity edema, palpitations, lightheadedness, syncope.  All other systems were reviewed and are negative     PFSH:  Patient Active Problem List   Diagnosis   • Coronary artery disease of native artery of native heart with stable angina pectoris (CMS/HCC)   • Peripheral vascular disease (CMS/HCC)   • Paroxysmal atrial fibrillation (CMS/HCC)   • Essential hypertension   • Dyslipidemia   • GERD (gastroesophageal reflux disease)   • Osteoporosis   • Abdominal aortic aneurysm (CMS/HCC)   • Bradycardia   • Hypertensive heart disease   • TIA (transient ischemic attack)   • CVA (cerebral vascular accident) (CMS/HCC)   • Syncope   • COPD (chronic obstructive pulmonary disease) (CMS/HCC)   • Chest pain   • Sick sinus syndrome (CMS/HCC)   • Carotid stenosis, asymptomatic, bilateral         Current Outpatient Medications:    •  apixaban (ELIQUIS) 2.5 MG tablet tablet, Take 2.5 mg by mouth 2 (Two) Times a Day., Disp: , Rfl:   •  aspirin 81 MG EC tablet, Take 81 mg by mouth Daily., Disp: , Rfl:   •  atorvastatin (LIPITOR) 40 MG tablet, Take 40 mg by mouth Every Night., Disp: , Rfl:   •  busPIRone (BUSPAR) 5 MG tablet, Take 5 mg by mouth 2 (Two) Times a Day., Disp: , Rfl:   •  Calcium Carb-Cholecalciferol (CALCIUM 600 + D PO), Take 1 tablet by mouth Every Night., Disp: , Rfl:   •  CloNIDine (CATAPRES) 0.1 MG tablet, Take 0.1 mg by mouth Daily As Needed for High Blood Pressure (for SBP>170)., Disp: , Rfl:   •  dofetilide (TIKOSYN) 125 MCG capsule, Take 1 capsule by mouth 2 (Two) Times a Day., Disp: 180 capsule, Rfl: 3  •  escitalopram (LEXAPRO) 10 MG tablet, Take 10 mg by mouth Every Morning., Disp: , Rfl:   •  ipratropium-albuterol (DUO-NEB) 0.5-2.5 mg/3 ml nebulizer, Take 3 mL by nebulization Every 4 (Four) Hours As Needed for Wheezing., Disp: , Rfl:   •  isosorbide mononitrate (IMDUR) 60 MG 24 hr tablet, Take 1 tablet by mouth Every Morning., Disp: 30 tablet, Rfl: 11  •  losartan (COZAAR) 50 MG tablet, Take 1 tablet by mouth Daily. (Patient taking differently: Take 100 mg by mouth Daily. 2 tablets at bedtime), Disp: 90 tablet, Rfl: 3  •  nitroglycerin (NITROSTAT) 0.4 MG SL tablet, Place 1 tablet under the tongue Every 5 (Five) Minutes As Needed for Chest Pain. Take no more than 3 doses in 15 minutes., Disp: 30 tablet, Rfl: 6  •  O2 (OXYGEN), Inhale 2 L/min Daily., Disp: , Rfl:   •  omeprazole (PriLOSEC) 20 MG capsule, Take 20 mg by mouth Every Morning., Disp: , Rfl:   •  predniSONE (DELTASONE) 10 MG tablet, Take 1 mg by mouth Daily., Disp: , Rfl:   •  Probiotic Product (Ayudarum) capsule, Take 1 capsule by mouth Every Morning., Disp: , Rfl:   •  propranolol LA (INDERAL LA) 80 MG 24 hr capsule, Take 1 capsule by mouth 2 (Two) Times a Day. (Patient taking differently: Take 120 mg by mouth 2 (Two) Times a Day.), Disp: 60  "capsule, Rfl: 6  •  traMADol (ULTRAM) 50 MG tablet, Take 50 mg by mouth 3 (Three) Times a Day. TAKES 2 50MG TABLETS 3 TIMES DAILY, Disp: , Rfl:   •  ranolazine (RANEXA) 500 MG 12 hr tablet, Take 1 tablet by mouth 2 (Two) Times a Day., Disp: 60 tablet, Rfl: 11    Allergies   Allergen Reactions   • Adhesive Tape    • Epinephrine      \"SHAKES ALL OVER\"   • Keflex [Cephalexin]      \"UNKNOWN\"   • Niaspan [Niacin Er]      \"UNKNOWN\"   • Pacerone [Amiodarone] Hallucinations   • Celebrex [Celecoxib] GI Intolerance     \"GI PAIN\"        reports that she quit smoking about 33 years ago. Her smoking use included cigarettes. She quit after 15.00 years of use. She has never used smokeless tobacco.    Past family medical history: No immediate family history of premature coronary artery disease.      Objective:   Physical exam:  /68 (BP Location: Left arm, Patient Position: Sitting)   Pulse 78   Ht 165.1 cm (65\")   Wt 76.2 kg (168 lb)   LMP  (LMP Unknown)   BMI 27.96 kg/m²   CONSTITUTIONAL: No acute distress  RESPIRATORY: Normal effort. Clear to auscultation bilaterally without wheezing or rales  CARDIOVASCULAR: Carotids with normal upstrokes without bruits.  Regular rate and rhythm with normal S1 and S2. Without murmur, gallop or rub. Normal radial pulse. There is no lower extremity edema bilaterally.  PSYCH: Normal affect    2/2020 exam: Right DP 0-1+, left DP 2+    Labs:    BUN   Date Value Ref Range Status   05/01/2019 22 8 - 23 mg/dL Final     Creatinine   Date Value Ref Range Status   05/01/2019 1.14 (H) 0.57 - 1.00 mg/dL Final     Potassium   Date Value Ref Range Status   05/01/2019 4.7 3.5 - 5.2 mmol/L Final     ALT (SGPT)   Date Value Ref Range Status   05/01/2019 13 1 - 33 U/L Final     AST (SGOT)   Date Value Ref Range Status   05/01/2019 19 1 - 32 U/L Final     Comment:     Specimen hemolyzed.  Results may be affected.       Lab Results   Component Value Date    CHOL 181 04/26/2017     Lab Results   Component " Value Date    TRIG 224 (H) 04/26/2017     Lab Results   Component Value Date    HDL 46 04/26/2017     Lab Results   Component Value Date    LDL 95 04/26/2017     No components found for: LDLDIRECTC    Diagnostic Data:      ECG 12 Lead  Date/Time: 2/19/2020 4:38 PM  Performed by: Jesus Araujo MD  Authorized by: Jesus Araujo MD   Comparison: compared with previous ECG from 1/9/2020  Similar to previous ECG  Comments: Image reviewed by myself, electronic atrial pacemaker, left axis deviation, LVH, heart rate 78, QRS 74, QTc 453            Echocardiogram 8/22/19: EF 60-65%, no significant valvular abnormalities    [facility] 4/2017, images reviewed by myself today  · Severe CAD involving the mid LAD and mid circumflex.  · There was a discrete, hazy 60% mid LAD stenosis that was found to be functional significant with an iFR 0.85.  The stenosis is now status post intervention with a Xience Alpine 3.0x18 mm drug-eluting stent  · There was a 70% mid circumflex stenosis that is now status post intervention with a Xience Alpine 2.5 x 23 mm drug-eluting stent    Assessment and Plan:   Chary was seen today for hypertension and atrial fibrillation.    Diagnoses and all orders for this visit:    Precordial chest pain  Coronary artery disease involving native coronary artery of native heart without angina pectoris  -Symptoms are concerning for typical angina that at times occurs at rest, improved with sublingual nitro  -Start Ranexa 500 mg twice daily  -Continue increased dose of Imdur at 60 mg daily  -Continue aspirin, statin, beta-blocker  -I discussed the risks and benefits of performing a heart catheterization with possible PCI at length today with the patient and her 2 daughters.  The patient has multiple other comorbidities and the goals of coronary intervention were discussed with the patient.  The focus would be on symptom relief.  They wish to try the above listed medicine first.  We will call them in 1 week.  If she  continues to have chest pains and if it is within the patient's goals of care, we will proceed with a heart catheterization with possible PCI via the right radial artery.    Sick sinus syndrome (CMS/HCC)  Atrial fibrillation  -Status post pacemaker, on Eliquis and Tikosyn, followed by EP, stable  -We will discuss the possible use of Namenda concurrently with Tikosyn with Dr. Cross     peripheral vascular disease (CMS/HCC)  -Decreased pulses on the right pedal vessels  -Possible claudication equivalent  -Due to her multitude of symptoms, and lack of clear exacerbation of this issue, will clinically monitor for now and consider repeat ABIs in the near future    Carotid stenosis, asymptomatic, bilateral  -We will try to obtain her most recent [facility] from [facility], followed by Dr. Farnsworth, by the patient's report her disease is moderate in severity, nonobstructive        - Return in about 8 weeks (around 4/15/2020) for Shira Quevedo.    Jesus Araujo MD, MSc, FACC

## 2020-02-20 ENCOUNTER — TELEPHONE (OUTPATIENT)
Dept: CARDIOLOGY | Facility: CLINIC | Age: 85
End: 2020-02-20

## 2020-02-20 NOTE — TELEPHONE ENCOUNTER
----- Message from Jesus Araujo MD sent at 2/20/2020 10:08 AM EST -----  Please let the patient's daughters know that I spoke with Dr. Cross regarding starting Namenda in the setting of being on Tikosyn.  He advises against it.  In other words, we do not recommend starting Namenda.

## 2020-02-20 NOTE — TELEPHONE ENCOUNTER
Patient's daughter called regarding Namenda and Tikosyn. Pt daughter advised that it is not recommended to take. Pt daughter verbalizes understanding.       Of note- patient daughter requesting to call office next week regarding chest pains rather than our office called them. Pt daughter given number to call to report.

## 2020-02-25 ENCOUNTER — TELEPHONE (OUTPATIENT)
Dept: CARDIOLOGY | Facility: CLINIC | Age: 85
End: 2020-02-25

## 2020-02-25 NOTE — TELEPHONE ENCOUNTER
"Contacted daughter regarding patients chest pain after beginning Ranexa 500 mg BID. Pt's daughter states that the patient have not had any recurrent chest pains, however, she is now experiencing fatigue, low energy, weakness that they believe is caused by the Ranexa. Patients daughter states that her BP has been \"normal\" but does not have specific numbers as the caretaker takes the BP every morning.     Please advise.   "

## 2020-02-26 RX ORDER — ISOSORBIDE MONONITRATE 60 MG/1
90 TABLET, EXTENDED RELEASE ORAL EVERY MORNING
Qty: 45 TABLET | Refills: 11 | Status: SHIPPED | OUTPATIENT
Start: 2020-02-26 | End: 2021-01-01

## 2020-02-26 NOTE — TELEPHONE ENCOUNTER
Daughter contacted to review recommendations per [facility]. Pt to stop Ranexa and monitor symptoms. Pt to increase Imdur to 90 mg daily. Pt daughter advised to call office early next week to update on symptoms off Ranexa. Verbalizes understanding, all questions answered at this time.

## 2020-03-13 ENCOUNTER — TELEPHONE (OUTPATIENT)
Dept: CARDIOLOGY | Facility: CLINIC | Age: 85
End: 2020-03-13

## 2020-03-13 NOTE — TELEPHONE ENCOUNTER
Patient daughter called to report that the patient just got out of the hospital after having Norovirus. Patient received IV fluids while hospitalized and is now experiencing elevated BP and lower extremity edema. Patients daughter advised to have patient take PRN Lasix through the weekend and call office on Monday with update.

## 2020-03-16 NOTE — TELEPHONE ENCOUNTER
Patients daughter called to report that patients swelling is much improved with PRN lasix. Pt BP averaging 133/70. Pt advised to continue current plan of care.

## 2020-03-26 ENCOUNTER — TELEPHONE (OUTPATIENT)
Dept: CARDIOLOGY | Facility: CLINIC | Age: 85
End: 2020-03-26

## 2020-03-26 NOTE — TELEPHONE ENCOUNTER
Patients daughter called to report that patients BP has been averaging 170/110 in the mornings and falls to 138/80 during early afternoon. Pts daughter advised to have patient take Losartan 100 mg at bedtime as prescribed by Dr. Cross. (patient has been taking 50 mg in morning and 50 mg at bedtime). Pt daughter to call Tuesday with update on pts BP.

## 2020-04-02 ENCOUNTER — TELEPHONE (OUTPATIENT)
Dept: CARDIOLOGY | Facility: CLINIC | Age: 85
End: 2020-04-02

## 2020-04-02 ENCOUNTER — TELEMEDICINE (OUTPATIENT)
Dept: CARDIOLOGY | Facility: CLINIC | Age: 85
End: 2020-04-02

## 2020-04-02 VITALS
HEART RATE: 70 BPM | WEIGHT: 170 LBS | DIASTOLIC BLOOD PRESSURE: 83 MMHG | HEIGHT: 65 IN | BODY MASS INDEX: 28.32 KG/M2 | SYSTOLIC BLOOD PRESSURE: 147 MMHG

## 2020-04-02 DIAGNOSIS — I49.5 SICK SINUS SYNDROME (HCC): ICD-10-CM

## 2020-04-02 DIAGNOSIS — I48.0 PAROXYSMAL ATRIAL FIBRILLATION (HCC): ICD-10-CM

## 2020-04-02 DIAGNOSIS — I11.0 HYPERTENSIVE HEART DISEASE WITH CONGESTIVE HEART FAILURE, UNSPECIFIED HEART FAILURE TYPE (HCC): ICD-10-CM

## 2020-04-02 DIAGNOSIS — I65.23 CAROTID STENOSIS, ASYMPTOMATIC, BILATERAL: ICD-10-CM

## 2020-04-02 DIAGNOSIS — I25.118 CORONARY ARTERY DISEASE OF NATIVE ARTERY OF NATIVE HEART WITH STABLE ANGINA PECTORIS (HCC): Primary | ICD-10-CM

## 2020-04-02 DIAGNOSIS — I73.9 PERIPHERAL VASCULAR DISEASE (HCC): ICD-10-CM

## 2020-04-02 PROCEDURE — 99215 OFFICE O/P EST HI 40 MIN: CPT | Performed by: INTERNAL MEDICINE

## 2020-04-02 RX ORDER — AMLODIPINE BESYLATE 5 MG/1
5 TABLET ORAL DAILY
Qty: 30 TABLET | Refills: 11 | Status: SHIPPED | OUTPATIENT
Start: 2020-04-02 | End: 2020-04-17 | Stop reason: SDUPTHER

## 2020-04-02 NOTE — PROGRESS NOTES
[facility] AT [address], [address]  [place], [place], [id number] [phone]  [email or web address]           OUTPATIENT CLINIC FOLLOW UP NOTE    Telemedicine via synchronous audio and video platform    Patient Care Team:  Patient Care Team:  Saige Tobias MD as PCP - General (Internal Medicine)  Nathanael Davis MD as PCP - Claims Attributed  Dex Kelly MD as Consulting Physician (Cardiology)  Ricardo Cross MD as Consulting Physician (Cardiology)    Subjective:      Chief Complaint   Patient presents with   • Chest Pain   • Hypertension       HPI:    Chary Boucher is a 86 y.o. female.  Cardiac problem list:  1. Coronary artery disease:  a. Left heart catheterization 2002, multi-vessel coronary disease/medical therapy.  b. Stress Cardiolite 2004, no ischemia.  c. Chest pain/left heart catheterization, December 2008, Dr. Mendoza revealing significant stenosis treated with drug eluting stent. Normal LV function/”spasm” right coronary artery (catheter induced).  d. Stress Cardiolite, 07/27/2010: Mild anterior ischemia with questionable artifact. Normal LV size and function.   e. Lexiscan Cardiolite, 03/25/2015 showing left ventricular ejection fraction 75%, no ischemia.   f. [facility] Dr Araujo with PTCA/stenting of LAD and LCFX with ESTEPHANIA 4/2017  2. Peripheral vascular disease:  a. Severe right common iliac stenosis.  b. Status post successful PTCA and placement of stent, December 2008.  c. Abdominal aortic aneurysm  3. Carotid stenosis: Status-post PTCA and stenting of the right ICA, 09/25/2008.   4. Atrial fibrillation:  a. Tikosyn therapy 10/25/2007.  b. Medtronic dual chamber pacemaker.  c. Generator change ([facility]) 10/10/17  5. Essential Hypertension.  6. Dyslipidemia.  7. Degenerative joint disease/arthritis.  8. GERD.  9. Osteoporosis.  10. Possible abdominal aortic aneurysm:  a. Ultrasound of the abdomen on 03/18/2015 with size measured at 2.3  centimeters.  11. COPD/Pulmonary Fibrosis  12. Tremor, on propranolol    Today's visit was a synchronous audio and video visit for follow-up on her above cardiovascular problems.    Since her last visit the patient was hospitalized at [facility] with pneumonia.  Details are not known.  Since discharge she has been having hypertension, with blood pressure in the mornings ranging upwards of the 180s systolic.  Changing the timing of her losartan has not helped.  Blood pressure decreases to the 140s by midday.  The patient has associated fatigue.    The patient has chronic dyspnea that is at its baseline.  Uses home oxygen.  Denies associated chest pains.  Her previous chest pains that she was having at her last visit are no longer present.  Had side effects from Ranexa.  Those side effects resolved after discontinuation of Ranexa.    Possible sleep apnea with some mouth breathing and changes in breathing pattern at night.  Sleeps a full night but still sometimes is fatigued during the daytime.  No overt snoring noted.    Denies claudication with exertion such as cramping in her legs when she walks.  Has not been getting very far due to the current stay at home orders during this coronavirus outbreak.  Noted weakness in her right leg more than left in the past.    Follows with pulmonology in [place]    Seen by Dr. Farnsworth recently for carotid stenosis.  Had an ultrasound recently which showed moderate nonobstructive disease.    Review of Systems:  Positive for dyspnea, weakness, fatigue,  Negative for exertional chest pain, dyspnea with exertion, orthopnea, PND, lower extremity edema, palpitations, lightheadedness, syncope.     PFSH:  Patient Active Problem List   Diagnosis   • Coronary artery disease of native artery of native heart with stable angina pectoris (CMS/HCC)   • Peripheral vascular disease (CMS/HCC)   • Paroxysmal atrial fibrillation (CMS/HCC)   • Essential hypertension   •  Dyslipidemia   • GERD (gastroesophageal reflux disease)   • Osteoporosis   • Abdominal aortic aneurysm (CMS/HCC)   • Bradycardia   • Hypertensive heart disease   • TIA (transient ischemic attack)   • CVA (cerebral vascular accident) (CMS/HCC)   • Syncope   • COPD (chronic obstructive pulmonary disease) (CMS/HCC)   • Chest pain   • Sick sinus syndrome (CMS/HCC)   • Carotid stenosis, asymptomatic, bilateral         Current Outpatient Medications:   •  apixaban (ELIQUIS) 2.5 MG tablet tablet, Take 2.5 mg by mouth 2 (Two) Times a Day., Disp: , Rfl:   •  aspirin 81 MG EC tablet, Take 81 mg by mouth Daily., Disp: , Rfl:   •  atorvastatin (LIPITOR) 40 MG tablet, Take 40 mg by mouth Every Night., Disp: , Rfl:   •  busPIRone (BUSPAR) 5 MG tablet, Take 5 mg by mouth 2 (Two) Times a Day., Disp: , Rfl:   •  Calcium Carb-Cholecalciferol (CALCIUM 600 + D PO), Take 1 tablet by mouth Every Night., Disp: , Rfl:   •  CloNIDine (CATAPRES) 0.1 MG tablet, Take 0.1 mg by mouth Daily As Needed for High Blood Pressure (for SBP>170)., Disp: , Rfl:   •  dofetilide (TIKOSYN) 125 MCG capsule, Take 1 capsule by mouth 2 (Two) Times a Day., Disp: 180 capsule, Rfl: 3  •  escitalopram (LEXAPRO) 10 MG tablet, Take 10 mg by mouth Every Morning., Disp: , Rfl:   •  ipratropium-albuterol (DUO-NEB) 0.5-2.5 mg/3 ml nebulizer, Take 3 mL by nebulization Every 4 (Four) Hours As Needed for Wheezing., Disp: , Rfl:   •  isosorbide mononitrate (IMDUR) 60 MG 24 hr tablet, Take 1.5 tablets by mouth Every Morning. (Patient taking differently: Take 60 mg by mouth Every Morning.), Disp: 45 tablet, Rfl: 11  •  losartan (COZAAR) 50 MG tablet, Take 1 tablet by mouth Daily. (Patient taking differently: Take 100 mg by mouth Daily. 2 tablets at bedtime), Disp: 90 tablet, Rfl: 3  •  nitroglycerin (NITROSTAT) 0.4 MG SL tablet, Place 1 tablet under the tongue Every 5 (Five) Minutes As Needed for Chest Pain. Take no more than 3 doses in 15 minutes., Disp: 30 tablet, Rfl: 6  •   "O2 (OXYGEN), Inhale 2 L/min Daily., Disp: , Rfl:   •  omeprazole (PriLOSEC) 20 MG capsule, Take 20 mg by mouth Every Morning., Disp: , Rfl:   •  predniSONE (DELTASONE) 10 MG tablet, Take 1 mg by mouth Daily., Disp: , Rfl:   •  Probiotic Product (Pandorama) capsule, Take 1 capsule by mouth Every Morning., Disp: , Rfl:   •  propranolol LA (INDERAL LA) 80 MG 24 hr capsule, Take 1 capsule by mouth 2 (Two) Times a Day. (Patient taking differently: Take 120 mg by mouth 2 (Two) Times a Day.), Disp: 60 capsule, Rfl: 6  •  traMADol (ULTRAM) 50 MG tablet, Take 50 mg by mouth 3 (Three) Times a Day. TAKES 2 50MG TABLETS 3 TIMES DAILY, Disp: , Rfl:   •  amLODIPine (NORVASC) 5 MG tablet, Take 1 tablet by mouth Daily., Disp: 30 tablet, Rfl: 11    Allergies   Allergen Reactions   • Epinephrine Other (See Comments)     \"SHAKES ALL OVER\"   • Pacerone [Amiodarone] Hallucinations   • Adhesive Tape    • Celebrex [Celecoxib] GI Intolerance     \"GI PAIN\"   • Keflex [Cephalexin] Other (See Comments)     \"UNKNOWN\"   • Niaspan [Niacin Er] Other (See Comments)     \"UNKNOWN\"        reports that she quit smoking about 33 years ago. Her smoking use included cigarettes. She quit after 15.00 years of use. She has never used smokeless tobacco.    Past family medical history: No immediate family history of premature coronary artery disease.      Objective:   Physical exam:  /83   Pulse 70   Ht 165.1 cm (65\")   Wt 77.1 kg (170 lb)   LMP  (LMP Unknown)   BMI 28.29 kg/m²   General: No acute distress  Pulmonary: Normal effort, breathing comfortably at rest, nasal cannula oxygen being utilized  Psych: Normal affect and mood    2/2020 exam: Right DP 0-1+, left DP 2+    Labs:    BUN   Date Value Ref Range Status   05/01/2019 22 8 - 23 mg/dL Final     Creatinine   Date Value Ref Range Status   05/01/2019 1.14 (H) 0.57 - 1.00 mg/dL Final     Potassium   Date Value Ref Range Status   05/01/2019 4.7 3.5 - 5.2 mmol/L Final     ALT (SGPT) "   Date Value Ref Range Status   05/01/2019 13 1 - 33 U/L Final     AST (SGOT)   Date Value Ref Range Status   05/01/2019 19 1 - 32 U/L Final     Comment:     Specimen hemolyzed.  Results may be affected.       Lab Results   Component Value Date    CHOL 181 04/26/2017     Lab Results   Component Value Date    TRIG 224 (H) 04/26/2017     Lab Results   Component Value Date    HDL 46 04/26/2017     Lab Results   Component Value Date    LDL 95 04/26/2017     No components found for: LDLDIRECTC    Diagnostic Data:    Procedures    Echocardiogram 8/22/19: EF 60-65%, no significant valvular abnormalities    [facility] 4/2017, images reviewed by myself today  · Severe CAD involving the mid LAD and mid circumflex.  · There was a discrete, hazy 60% mid LAD stenosis that was found to be functional significant with an iFR 0.85.  The stenosis is now status post intervention with a Xience Alpine 3.0x18 mm drug-eluting stent  · There was a 70% mid circumflex stenosis that is now status post intervention with a Xience Alpine 2.5 x 23 mm drug-eluting stent    PFTs 2/2020 - Dr Null, pulmonology, moderate reduction in FVC suggestive of restrictive defect, but FEV1/FVC was normal, flow volume loop compatible with restrictive defect    Carotid duplex ultrasound 12/2019, outside facility: Less than 20% proximal ICA stenosis bilaterally, antegrade flow in the vertebral arteries bilaterally    Assessment and Plan:   Chary was seen today for hypertension and atrial fibrillation.    Diagnoses and all orders for this visit:    Essential hypertension  -Severe exacerbation of hypertension after her recent hospitalization  -We will obtain the discharge summary from her recent [facility] admission   -Blood pressure has been elevated, adding amlodipine 5 mg daily, we will call her next week to check on how her blood pressure is doing and potentially increase it to 10 mg daily if needed if averaging above 140/90 mmHg  -Continue  losartan and propranolol  -Has clonidine as needed but received it once in the hospital and had a significant drop in her blood pressure from 190 mmHg to 90 mmHg systolic    Precordial chest pain  Coronary artery disease involving native coronary artery of native heart without angina pectoris  -CCS class 0 symptoms currently  -Continue aspirin, statin, beta-blocker  -Continue dose of Imdur at 60 mg daily  -Didn't tolerate Ranexa   -Adding amlodipine 5 mg daily both as an antianginal and antihypertensive.  -Discussed in 1/2020 the risks and benefits of repeat heart catheterization.  Will defer for now since she is clinically stable from a CAD standpoint.      Sick sinus syndrome (CMS/HCC)  Atrial fibrillation  -Possibly with symptomatic atrial fibrillation, continue Tikosyn and propranolol  -Status post pacemaker, followed by EP, next appointment 7/2020  -We will make sure the patient is well connected with our remote device clinic since she is no longer living in an assisted living facility and is living with her daughters during the coronavirus outbreak.  She is due for a remote device check.  The last one in 1/2020 showed many years of battery life and 7% mode switching    Peripheral vascular disease (CMS/HCC)  -Decreased pulses on the right pedal vessels in the past.  -Possible claudication equivalent, but currently stable  -Due to her multitude of symptoms, and lack of clear exacerbation of this issue, will clinically monitor for now and consider repeat ABIs in the near future    Carotid stenosis, asymptomatic, bilateral  -Mild as of 12/2019    Restrictive lung disease  Possible sleep apnea  -On home O2, follows up with pulmonology in [place], [place], with Dr. Null  -Advised that she electively discusses the possibility of sleep apnea with her pulmonologist at their next visit.  This may be influencing her hypertension    - Return in about 6 months (around 10/2/2020).    This patient has consented to a  telehealth visit via a synchronous audio and video platform, [employer]. The visit was scheduled as a telemedicine visit to comply with patient safety concerns in accordance with CDC recommendations.  All vitals recorded within this visit are reported by the patient.  I spent 40 minutes in total including but not limited to the 24 minutes spent in direct conversation with this patient.       Jesus Araujo MD, MSc, FACC

## 2020-04-10 ENCOUNTER — TELEPHONE (OUTPATIENT)
Dept: CARDIOLOGY | Facility: CLINIC | Age: 85
End: 2020-04-10

## 2020-04-10 NOTE — TELEPHONE ENCOUNTER
Daughter returned call. Patients BP averaging 152/86 HR 74. Per MJS last note, advised daughter to increase patients Norvasc to 10 mg daily. Pt daughter to call back next week with update on BP.

## 2020-04-17 ENCOUNTER — TELEPHONE (OUTPATIENT)
Dept: CARDIOLOGY | Facility: CLINIC | Age: 85
End: 2020-04-17

## 2020-04-17 RX ORDER — AMLODIPINE BESYLATE 10 MG/1
10 TABLET ORAL DAILY
Qty: 30 TABLET | Refills: 3 | Status: SHIPPED | OUTPATIENT
Start: 2020-04-17 | End: 2020-05-05

## 2020-04-17 NOTE — TELEPHONE ENCOUNTER
Patient bp averaging 144/90. Pts daughter states that they are very happy with this number as they do not want her BP to be any lower. Pt does complain of increased swelling in lower legs after increasing Norvasc. Pt states that the swelling is better with elevation. Daughter states that they will watch swelling over the next week and call if worsens.

## 2020-05-04 ENCOUNTER — TELEPHONE (OUTPATIENT)
Dept: CARDIOLOGY | Facility: CLINIC | Age: 85
End: 2020-05-04

## 2020-05-04 NOTE — TELEPHONE ENCOUNTER
Patients daughter called to ask about switching patients blood pressure medicine. Pt daughter states that the Amlodipine is causing an increase in lower extremity swelling. Pts BP averaging 120-140/70.     Please advise. Thanks!

## 2020-05-05 RX ORDER — DOXAZOSIN 2 MG/1
2 TABLET ORAL NIGHTLY
Qty: 30 TABLET | Refills: 11 | Status: SHIPPED | OUTPATIENT
Start: 2020-05-05 | End: 2020-05-29

## 2020-05-05 NOTE — TELEPHONE ENCOUNTER
Patients daughter contacted to review recommendations per MJS. Pts daughter to call office if BP remains above 140/90.

## 2020-05-05 NOTE — TELEPHONE ENCOUNTER
Switch amlodipine to doxazosin 2 mg nightly, and have them call us if she averages above 140/90 mmHg.

## 2020-05-18 ENCOUNTER — TELEPHONE (OUTPATIENT)
Dept: CARDIOLOGY | Facility: CLINIC | Age: 85
End: 2020-05-18

## 2020-05-18 NOTE — TELEPHONE ENCOUNTER
Patients daughter called to report that since Saturday, patient has had low blood pressure ranging 80//80 despite holding BP medications. Patient's daughter also reports inability to void during the day (no more than a tsp each time), high heart rate, and rapid shallow breathing. Pt's daughter states that her RR is as fast as her HR but is unable to count respirations. Patient daughter denies fever but does state that patient does complain of SOA. Patient is having some edema.     Patient's daughter advised to get patient evaluated in ER. Patients daughter reluctant. Patient's daughter to make appt with PCP today.

## 2020-05-18 NOTE — TELEPHONE ENCOUNTER
Patients daughter called back and they are unable to see her PCP until Wednesday but does not feel like she needs to be seen. She now states that her breathing is worse when she is having periods of AFIB although the patient cannot tell when she is in it. See note below for other symptoms.     Patients daughter sent in remote device interrogation. Also- see note from [place].     Please advise. Thanks.

## 2020-05-18 NOTE — TELEPHONE ENCOUNTER
Reviewed remote interrogation. AMS 8% of the time, rate controlled during those times.     If the patient is that dyspneic, I also recommend going to the ER. If stable, also strongly recommend a PCP visit for checking vitals, labs, discussing dyspnea and poor urine output.

## 2020-05-18 NOTE — TELEPHONE ENCOUNTER
Pts daughter called requesting to send a manual transmission due to her mom being short of air.After reviewing transmission I called to inform the daughter that her mom is still having episodes of atrial fib. However, it is nothing new or different than what she has had in the past.The daughter is convinced that her moms breathing episodes are caused from her atrial fib. I explained to her that unless her mom was on a monitor and we could see her heart rhythm, there would be no way we could correlate her breathing episodes with her atrial fib. I advised her we would continue to monitor her mom if she had any more question she could call.

## 2020-05-19 NOTE — TELEPHONE ENCOUNTER
Patients daughter called with recommendations per MJS. Patients daughter states that they have an appt with PCP on Wednesday but she is undecided if she is going to keep the appt. Strongly advised daughter to keep appt to be evaluated at PCP and if patients breathing is worse, to be evaluated at ER. All questions answered at this time.      Of note- patients daughter stated she had to hold patients medications last night again due to low BP.

## 2020-05-29 ENCOUNTER — TELEPHONE (OUTPATIENT)
Dept: CARDIOLOGY | Facility: CLINIC | Age: 85
End: 2020-05-29

## 2020-05-29 RX ORDER — HYDRALAZINE HYDROCHLORIDE 25 MG/1
25 TABLET, FILM COATED ORAL 2 TIMES DAILY
Qty: 60 TABLET | Refills: 11 | Status: SHIPPED | OUTPATIENT
Start: 2020-05-29 | End: 2020-06-05

## 2020-05-29 NOTE — TELEPHONE ENCOUNTER
Patients daughter called to report BP elevated in 180's systolic in early morning and normalizes by lunch time. Patients daughter states she is going to discuss sleep apnea and referral to sleep medicine with patients pulmonologist.     Daughter is asking about possibly switching some of her BP medications that could potentially be causing patient to have some urinary retention. She states this mostly started after adding in Cardura at night.     Of note- recent PCP note and labs are available to view under Media.     Please advise. Thanks!

## 2020-06-05 RX ORDER — HYDRALAZINE HYDROCHLORIDE 50 MG/1
50 TABLET, FILM COATED ORAL 2 TIMES DAILY
Start: 2020-06-05 | End: 2020-06-09

## 2020-06-09 ENCOUNTER — TRANSCRIBE ORDERS (OUTPATIENT)
Dept: ADMINISTRATIVE | Facility: HOSPITAL | Age: 85
End: 2020-06-09

## 2020-06-09 ENCOUNTER — LAB (OUTPATIENT)
Dept: LAB | Facility: HOSPITAL | Age: 85
End: 2020-06-09

## 2020-06-09 ENCOUNTER — HOSPITAL ENCOUNTER (OUTPATIENT)
Dept: GENERAL RADIOLOGY | Facility: HOSPITAL | Age: 85
Discharge: HOME | End: 2020-06-09

## 2020-06-09 ENCOUNTER — HOSPITAL ENCOUNTER (OUTPATIENT)
Dept: GENERAL RADIOLOGY | Facility: HOSPITAL | Age: 85
Discharge: HOME | End: 2020-06-09
Admitting: NURSE PRACTITIONER

## 2020-06-09 ENCOUNTER — OFFICE VISIT (OUTPATIENT)
Dept: CARDIOLOGY | Facility: CLINIC | Age: 85
End: 2020-06-09

## 2020-06-09 VITALS
HEIGHT: 65 IN | DIASTOLIC BLOOD PRESSURE: 87 MMHG | SYSTOLIC BLOOD PRESSURE: 153 MMHG | HEART RATE: 76 BPM | WEIGHT: 177 LBS | BODY MASS INDEX: 29.49 KG/M2

## 2020-06-09 DIAGNOSIS — I25.10 CORONARY ARTERY DISEASE INVOLVING NATIVE CORONARY ARTERY OF NATIVE HEART WITHOUT ANGINA PECTORIS: ICD-10-CM

## 2020-06-09 DIAGNOSIS — R06.02 SHORTNESS OF BREATH: Primary | ICD-10-CM

## 2020-06-09 DIAGNOSIS — R06.02 SHORTNESS OF BREATH: ICD-10-CM

## 2020-06-09 DIAGNOSIS — M25.559 HIP PAIN: Primary | ICD-10-CM

## 2020-06-09 DIAGNOSIS — I48.0 PAROXYSMAL ATRIAL FIBRILLATION (HCC): ICD-10-CM

## 2020-06-09 DIAGNOSIS — I49.5 SICK SINUS SYNDROME (HCC): ICD-10-CM

## 2020-06-09 DIAGNOSIS — I10 ESSENTIAL HYPERTENSION: ICD-10-CM

## 2020-06-09 LAB
ALBUMIN SERPL-MCNC: 3.9 G/DL (ref 3.5–5.2)
ALBUMIN/GLOB SERPL: 1.3 G/DL
ALP SERPL-CCNC: 53 U/L (ref 39–117)
ALT SERPL W P-5'-P-CCNC: 14 U/L (ref 1–33)
ANION GAP SERPL CALCULATED.3IONS-SCNC: 12.5 MMOL/L (ref 5–15)
AST SERPL-CCNC: 18 U/L (ref 1–32)
BILIRUB SERPL-MCNC: 1.1 MG/DL (ref 0.2–1.2)
BUN BLD-MCNC: 17 MG/DL (ref 8–23)
BUN/CREAT SERPL: 13.5 (ref 7–25)
CALCIUM SPEC-SCNC: 9.6 MG/DL (ref 8.6–10.5)
CHLORIDE SERPL-SCNC: 101 MMOL/L (ref 98–107)
CO2 SERPL-SCNC: 27.5 MMOL/L (ref 22–29)
CREAT BLD-MCNC: 1.26 MG/DL (ref 0.57–1)
GFR SERPL CREATININE-BSD FRML MDRD: 40 ML/MIN/1.73
GLOBULIN UR ELPH-MCNC: 3.1 GM/DL
GLUCOSE BLD-MCNC: 123 MG/DL (ref 65–99)
NT-PROBNP SERPL-MCNC: 770.9 PG/ML (ref 5–1800)
POTASSIUM BLD-SCNC: 3.6 MMOL/L (ref 3.5–5.2)
PROT SERPL-MCNC: 7 G/DL (ref 6–8.5)
SODIUM BLD-SCNC: 141 MMOL/L (ref 136–145)

## 2020-06-09 PROCEDURE — 80053 COMPREHEN METABOLIC PANEL: CPT

## 2020-06-09 PROCEDURE — 83880 ASSAY OF NATRIURETIC PEPTIDE: CPT

## 2020-06-09 PROCEDURE — 71046 X-RAY EXAM CHEST 2 VIEWS: CPT

## 2020-06-09 PROCEDURE — 99214 OFFICE O/P EST MOD 30 MIN: CPT | Performed by: NURSE PRACTITIONER

## 2020-06-09 PROCEDURE — 73502 X-RAY EXAM HIP UNI 2-3 VIEWS: CPT

## 2020-06-09 PROCEDURE — 36415 COLL VENOUS BLD VENIPUNCTURE: CPT

## 2020-06-09 PROCEDURE — 93000 ELECTROCARDIOGRAM COMPLETE: CPT | Performed by: NURSE PRACTITIONER

## 2020-06-09 RX ORDER — AMLODIPINE BESYLATE 5 MG/1
5 TABLET ORAL DAILY
COMMUNITY
Start: 2020-06-08 | End: 2020-08-26

## 2020-06-09 NOTE — PROGRESS NOTES
[facility] AT [address], [address]  [place], [place], [id number] [phone]  WWW.[facility]           OUTPATIENT CLINIC FOLLOW UP NOTE    Patient Care Team:  Patient Care Team:  Saige Tobias MD as PCP - General (Internal Medicine)  Nathanael Davis MD as PCP - Claims Attributed  Dex Kelly MD as Consulting Physician (Cardiology)  Ricardo Cross MD as Consulting Physician (Cardiology)    Subjective:      Chief Complaint   Patient presents with   • Hypertensive heart disease with congestive heart failure, un     f/u       HPI:    Chary Boucher is a 86 y.o. female.  Cardiac problem list:  1. Coronary artery disease:  a. Left heart catheterization 2002, multi-vessel coronary disease/medical therapy.  b. Stress Cardiolite 2004, no ischemia.  c. Chest pain/left heart catheterization, December 2008, Dr. Mendoza revealing significant stenosis treated with drug eluting stent. Normal LV function/”spasm” right coronary artery (catheter induced).  d. Stress Cardiolite, 07/27/2010: Mild anterior ischemia with questionable artifact. Normal LV size and function.   e. Lexiscan Cardiolite, 03/25/2015 showing left ventricular ejection fraction 75%, no ischemia.   f. [facility] Dr Araujo with PTCA/stenting of LAD and LCFX with ESTEPHANIA 4/2017  2. Peripheral vascular disease:  a. Severe right common iliac stenosis.  b. Status post successful PTCA and placement of stent, December 2008.  c. Abdominal aortic aneurysm  3. Carotid stenosis: Status-post PTCA and stenting of the right ICA, 09/25/2008.   4. Atrial fibrillation:  a. Tikosyn therapy 10/25/2007.  b. Medtronic dual chamber pacemaker.  c. Generator change ([employer]) 10/10/17  5. Essential Hypertension.  6. Dyslipidemia.  7. Degenerative joint disease/arthritis.  8. GERD.  9. Osteoporosis.  10. Possible abdominal aortic aneurysm:  a. Ultrasound of the abdomen on 03/18/2015 with size measured at 2.3  "centimeters.  11. COPD/Pulmonary Fibrosis  12. Tremor, on propranolol    The patient presents today for follow-up.  The patient was last seen via telehealth she has had difficulty controlling her blood pressure.  She also notes increased dyspnea.  She also reports lower extremity edema bilaterally.  Yesterday, her dyspnea was much more prominent and required increased oxygen briefly.  The patient has nebulizer treatments, but feels that these do not always improve her symptoms.  She also notes an associated elevated heart rate and her daughter is concerned this is an increased amount of atrial fibrillation.  Her last remote device interrogation last month was stable.  She is also recently had difficulty while eating.  She often gets short of breath during this time and does frequently \"choke\" per her daughter.  Her daughter also notes that the patient has previously undergone swallow evaluations.    Her blood pressure is often in the 180s to 190 systolic in the morning.  However, after taking her morning medications it improves to the 140-150s range.  In the evening before bed it is often in the 110s.  Her daughter notes that her blood pressure was best controlled with amlodipine, but she had significant swelling at the 10 mg dosing.  She has not been able to tolerate Cardura.  She did not have significant improvement while taking hydralazine.    Her bilateral lower extremity edema is most prominent in the evening.  She notes that she keeps her legs elevated most of the day.  She does not typically have edema upon waking.    Review of Systems:  Positive for dyspnea, lower extremity edema, choking  Negative for exertional chest pain, orthopnea, PND,  palpitations, lightheadedness, syncope.     PFSH:  Patient Active Problem List   Diagnosis   • Coronary artery disease of native artery of native heart with stable angina pectoris (CMS/[facility])   • Peripheral vascular disease (CMS/[facility])   • Paroxysmal atrial fibrillation " (CMS/[facility])   • Essential hypertension   • Dyslipidemia   • GERD (gastroesophageal reflux disease)   • Osteoporosis   • Abdominal aortic aneurysm (CMS/[facility])   • Bradycardia   • Hypertensive heart disease   • TIA (transient ischemic attack)   • CVA (cerebral vascular accident) (CMS/[facility])   • Syncope   • COPD (chronic obstructive pulmonary disease) (CMS/[facility])   • Chest pain   • Sick sinus syndrome (CMS/[facility])   • Carotid stenosis, asymptomatic, bilateral         Current Outpatient Medications:   •  amLODIPine (NORVASC) 5 MG tablet, , Disp: , Rfl:   •  apixaban (ELIQUIS) 2.5 MG tablet tablet, Take 2.5 mg by mouth 2 (Two) Times a Day., Disp: , Rfl:   •  aspirin 81 MG EC tablet, Take 81 mg by mouth Daily., Disp: , Rfl:   •  atorvastatin (LIPITOR) 40 MG tablet, Take 40 mg by mouth Every Night., Disp: , Rfl:   •  busPIRone (BUSPAR) 5 MG tablet, Take 5 mg by mouth 2 (Two) Times a Day., Disp: , Rfl:   •  Calcium Carb-Cholecalciferol (CALCIUM 600 + D PO), Take 1 tablet by mouth Every Night., Disp: , Rfl:   •  CloNIDine (CATAPRES) 0.1 MG tablet, Take 0.1 mg by mouth Daily As Needed for High Blood Pressure (for SBP>170)., Disp: , Rfl:   •  dofetilide (TIKOSYN) 125 MCG capsule, Take 1 capsule by mouth 2 (Two) Times a Day., Disp: 180 capsule, Rfl: 3  •  escitalopram (LEXAPRO) 10 MG tablet, Take 10 mg by mouth Every Morning., Disp: , Rfl:   •  ipratropium-albuterol (DUO-NEB) 0.5-2.5 mg/3 ml nebulizer, Take 3 mL by nebulization Daily., Disp: , Rfl:   •  isosorbide mononitrate (IMDUR) 60 MG 24 hr tablet, Take 1.5 tablets by mouth Every Morning. (Patient taking differently: Take 60 mg by mouth Every Morning.), Disp: 45 tablet, Rfl: 11  •  losartan (COZAAR) 50 MG tablet, Take 1 tablet by mouth Daily. (Patient taking differently: Take 100 mg by mouth Daily. 2 tablets at bedtime), Disp: 90 tablet, Rfl: 3  •  nitroglycerin (NITROSTAT) 0.4 MG SL tablet, Place 1 tablet under the tongue Every 5 (Five) Minutes As Needed for Chest Pain. Take no more  "than 3 doses in 15 minutes., Disp: 30 tablet, Rfl: 6  •  O2 (OXYGEN), Inhale 2 L/min Daily., Disp: , Rfl:   •  omeprazole (PriLOSEC) 20 MG capsule, Take 20 mg by mouth Every Morning., Disp: , Rfl:   •  predniSONE (DELTASONE) 10 MG tablet, Take 1 mg by mouth Daily., Disp: , Rfl:   •  Probiotic Product ([facility]) capsule, Take 1 capsule by mouth Every Morning., Disp: , Rfl:   •  propranolol LA (INDERAL LA) 80 MG 24 hr capsule, Take 1 capsule by mouth 2 (Two) Times a Day. (Patient taking differently: Take 120 mg by mouth 2 (Two) Times a Day.), Disp: 60 capsule, Rfl: 6  •  traMADol (ULTRAM) 50 MG tablet, Take 50 mg by mouth 3 (Three) Times a Day. TAKES 2 50MG TABLETS 3 TIMES DAILY, Disp: , Rfl:     Allergies   Allergen Reactions   • Epinephrine Other (See Comments)     \"SHAKES ALL OVER\"   • Pacerone [Amiodarone] Hallucinations   • Adhesive Tape    • Celebrex [Celecoxib] GI Intolerance     \"GI PAIN\"   • Keflex [Cephalexin] Other (See Comments)     \"UNKNOWN\"   • Niaspan [Niacin Er] Other (See Comments)     \"UNKNOWN\"        reports that she quit smoking about 33 years ago. Her smoking use included cigarettes. She quit after 15.00 years of use. She has never used smokeless tobacco.    Past family medical history: No immediate family history of premature coronary artery disease.      Objective:   Physical exam:  /87 (BP Location: Right arm, Patient Position: Sitting)   Pulse 76   Ht 165.1 cm (65\")   Wt 80.3 kg (177 lb)   LMP  (LMP Unknown)   BMI 29.45 kg/m²   CONSTITUTIONAL: No acute distress  RESPIRATORY: Normal effort on O2/NC. Diminished breath sounds in the right base, mild rales in the left base.  CARDIOVASCULAR: Regular rate and rhythm with normal S1 and S2. Without murmur, gallop or rub.  PERIPHERAL VASCULAR: Normal radial pulse. There is mild lower extremity edema bilaterally.  PSYCH: Normal affect and mood     Labs:    BUN   Date Value Ref Range Status   05/01/2019 22 8 - 23 mg/dL Final "     Creatinine   Date Value Ref Range Status   05/01/2019 1.14 (H) 0.57 - 1.00 mg/dL Final     Potassium   Date Value Ref Range Status   05/01/2019 4.7 3.5 - 5.2 mmol/L Final     ALT (SGPT)   Date Value Ref Range Status   05/01/2019 13 1 - 33 U/L Final     AST (SGOT)   Date Value Ref Range Status   05/01/2019 19 1 - 32 U/L Final     Comment:     Specimen hemolyzed.  Results may be affected.       Lab Results   Component Value Date    CHOL 181 04/26/2017     Lab Results   Component Value Date    TRIG 224 (H) 04/26/2017     Lab Results   Component Value Date    HDL 46 04/26/2017     Lab Results   Component Value Date    LDL 95 04/26/2017     No components found for: LDLDIRECTC    5/22/2020 PCP labs, reviewed by myself    BUN 20  Creatinine 1.37  Sodium 140  Potassium 4.0    Diagnostic Data:      ECG 12 Lead  Date/Time: 6/9/2020 11:49 AM  Performed by: Shira Quevedo APRN  Authorized by: Shira Quevedo APRN   Comparison: compared with previous ECG from 2/19/2020  Similar to previous ECG  Rhythm: sinus rhythm and paced  Rate: normal  BPM: 78  Comments: QRS 74 ms   ms            Echocardiogram 8/22/19: EF 60-65%, no significant valvular abnormalities    [facility] 4/2017, images reviewed by myself today  · Severe CAD involving the mid LAD and mid circumflex.  · There was a discrete, hazy 60% mid LAD stenosis that was found to be functional significant with an iFR 0.85.  The stenosis is now status post intervention with a Xience Alpine 3.0x18 mm drug-eluting stent  · There was a 70% mid circumflex stenosis that is now status post intervention with a Xience Alpine 2.5 x 23 mm drug-eluting stent    PFTs 2/2020 - Dr Null, pulmonology, moderate reduction in FVC suggestive of restrictive defect, but FEV1/FVC was normal, flow volume loop compatible with restrictive defect    Carotid duplex ultrasound 12/2019, outside facility: Less than 20% proximal ICA stenosis bilaterally, antegrade flow in the vertebral arteries  bilaterally    Assessment and Plan:   Chary was seen today for hypertension and atrial fibrillation.    Diagnoses and all orders for this visit:    Dyspnea  Lower extremity edema  -Worsening over the past week.  Patient with mildly elevated BNP and creatinine on 5/22/2020 at PCPs office.  -Also concern for possible aspiration.  -We will repeat proBNP and CMP today as well as a chest x-ray.  -Pending results will consider short-term diuresis.    Essential hypertension  -Continued labile blood pressures at home.  -Patient to restart amlodipine at 5 mg daily.  Patient to keep a BP log for the next week and call with results.  Of note, patient did not tolerate higher 10 mg dosing due to bilateral lower extremity edema.  She has also been recently trialed on Cardura and hydralazine.    Coronary artery disease involving native coronary artery of native heart without angina pectoris  -Currently without chest pain.  -Continue aspirin, statin, beta-blocker, Imdur.  -Didn't tolerate Ranexa     Sick sinus syndrome (CMS/HCC)  Atrial fibrillation  -Possibly with symptomatic atrial fibrillation, continue Tikosyn and propranolol  -Status post pacemaker, followed by EP, next appointment with Dr. Cross 7/2020.  -We will have the patient send in a remote transmission.  Her remote interrogation last month was stable with 8 percent mode switching.    Peripheral vascular disease (CMS/HCC)  -Decreased pulses on the right pedal vessels in the past.  -Possible claudication equivalent, but currently stable  -Continue to monitor clinically    Carotid stenosis, asymptomatic, bilateral  -Mild as of 12/2019, continue to monitor clinically.    Restrictive lung disease  Possible sleep apnea  -On home O2, follows up with pulmonology in [place], [place], with Dr. Null  -Patient reports plans to discuss possible sleep apnea evaluation at time of next visit.    - Return in about 3 months (around 9/9/2020).    Shira Quevedo, APRN  06/09/20  11:58

## 2020-06-10 ENCOUNTER — TELEPHONE (OUTPATIENT)
Dept: CARDIOLOGY | Facility: CLINIC | Age: 85
End: 2020-06-10

## 2020-06-10 RX ORDER — FUROSEMIDE 20 MG/1
20 TABLET ORAL DAILY PRN
Qty: 30 TABLET | Refills: 2 | Status: SHIPPED | OUTPATIENT
Start: 2020-06-10 | End: 2021-01-01 | Stop reason: SDUPTHER

## 2020-06-10 NOTE — TELEPHONE ENCOUNTER
Patients daughter contacted to review CXRAY and lab results. Patient to take Lasix 20 mg daily for 3 days and PRN thereafter. Pts daughter advised to call office if Lasix is needed more than 3 times a week on average. verbalizes understanding, all questions answered at this time.

## 2020-06-11 ENCOUNTER — TELEPHONE (OUTPATIENT)
Dept: CARDIOLOGY | Facility: CLINIC | Age: 85
End: 2020-06-11

## 2020-06-11 NOTE — TELEPHONE ENCOUNTER
Patient's daughter would like to know what antifungal is safe with Tikosyn? Her mother has thrush.

## 2020-06-12 NOTE — TELEPHONE ENCOUNTER
Per an outpatient pharmacist at [facility], liquid Nystatin has no interaction with Tikosyn.

## 2020-07-29 ENCOUNTER — OFFICE VISIT (OUTPATIENT)
Dept: CARDIOLOGY | Facility: CLINIC | Age: 85
End: 2020-07-29

## 2020-07-29 VITALS
TEMPERATURE: 98.9 F | HEART RATE: 69 BPM | SYSTOLIC BLOOD PRESSURE: 146 MMHG | BODY MASS INDEX: 29.49 KG/M2 | HEIGHT: 65 IN | DIASTOLIC BLOOD PRESSURE: 80 MMHG | OXYGEN SATURATION: 95 % | WEIGHT: 177 LBS

## 2020-07-29 DIAGNOSIS — I48.0 PAROXYSMAL ATRIAL FIBRILLATION (HCC): Primary | ICD-10-CM

## 2020-07-29 DIAGNOSIS — I25.118 CORONARY ARTERY DISEASE OF NATIVE ARTERY OF NATIVE HEART WITH STABLE ANGINA PECTORIS (HCC): ICD-10-CM

## 2020-07-29 DIAGNOSIS — I49.5 SICK SINUS SYNDROME (HCC): ICD-10-CM

## 2020-07-29 DIAGNOSIS — I10 ESSENTIAL HYPERTENSION: ICD-10-CM

## 2020-07-29 PROCEDURE — 93000 ELECTROCARDIOGRAM COMPLETE: CPT | Performed by: INTERNAL MEDICINE

## 2020-07-29 PROCEDURE — 93280 PM DEVICE PROGR EVAL DUAL: CPT | Performed by: INTERNAL MEDICINE

## 2020-07-29 PROCEDURE — 99214 OFFICE O/P EST MOD 30 MIN: CPT | Performed by: INTERNAL MEDICINE

## 2020-07-29 NOTE — PROGRESS NOTES
"Chary Boucher  11/18/1933  [phone]    07/29/2020    [facility]     Saige Tobias MD  [address] [place] [address]    Chief Complaint   Patient presents with   • Atrial Fibrillation       Problem List:   1. Coronary disease:  a. Left heart catheterization 2002, multi-vessel coronary disease/medical therapy.  b. Stress Cardiolite 2004, no ischemia.  c. Chest pain/left heart catheterization, December 2008, Dr. Mendoza revealing significant LV stenosis treated with drug eluting stent. Normal LV function/”spasm” right coronary artery (catheter induced).  d. Stress Cardiolite, 07/27/2010: Mild anterior ischemia with questionable artifact. Normal LV size and function.   e. Lexiscan Cardiolite, 03/25/2015 showing left ventricular ejection fraction 75%, no ischemia.   f. [facility] Dr Araujo with PTCA/stenting of LAD and LCFX with ESTEPHANIA 4/2017  g. Echocardiogram 8/22/19: EF 60-65%, no significant valvular abnormalities  2. Peripheral vascular disease:  a. Severe right common iliac stenosis.  b. Abdominal aortic aneurysm measuring 2 cm in March 2008.  c. Status post successful PTCA and placement of stent, December 2008.  d. Status-post PTCA and stenting of the right ICA, 09/25/2008.   3. Atrial fibrillation:  a. Tikosyn therapy 10/25/2007.  b. Medtronic dual chamber pacemaker.  c. Generator change ([employer]) 10/10/17  4. Essential Hypertension.  5. Dyslipidemia.  6. Degenerative joint disease/arthritis.  7. GERD.  8. Osteoporosis.  9. Abdominal aortic aneurysm:  a. Ultrasound of the abdomen on 03/18/2015 with size measured at 2.3 centimeters.  10. COPD/Pulmonary Fibrosis    Allergies  Allergies   Allergen Reactions   • Epinephrine Other (See Comments)     \"SHAKES ALL OVER\"   • Pacerone [Amiodarone] Hallucinations   • Adhesive Tape    • Celebrex [Celecoxib] GI Intolerance     \"GI PAIN\"   • Keflex [Cephalexin] Other (See Comments)     \"UNKNOWN\"   • Niaspan [Niacin Er] " "Other (See Comments)     \"UNKNOWN\"       Current Medications    Current Outpatient Medications:   •  amLODIPine (NORVASC) 5 MG tablet, Take 5 mg by mouth Daily., Disp: , Rfl:   •  apixaban (ELIQUIS) 2.5 MG tablet tablet, Take 2.5 mg by mouth 2 (Two) Times a Day., Disp: , Rfl:   •  aspirin 81 MG EC tablet, Take 81 mg by mouth Daily., Disp: , Rfl:   •  atorvastatin (LIPITOR) 40 MG tablet, Take 40 mg by mouth Every Night., Disp: , Rfl:   •  busPIRone (BUSPAR) 5 MG tablet, Take 5 mg by mouth 2 (Two) Times a Day., Disp: , Rfl:   •  Calcium Carb-Cholecalciferol (CALCIUM 600 + D PO), Take 1 tablet by mouth Every Night., Disp: , Rfl:   •  CloNIDine (CATAPRES) 0.1 MG tablet, Take 0.1 mg by mouth Daily As Needed for High Blood Pressure (for SBP>170)., Disp: , Rfl:   •  dofetilide (TIKOSYN) 125 MCG capsule, Take 1 capsule by mouth 2 (Two) Times a Day., Disp: 180 capsule, Rfl: 3  •  escitalopram (LEXAPRO) 10 MG tablet, Take 10 mg by mouth Every Morning., Disp: , Rfl:   •  furosemide (LASIX) 20 MG tablet, Take 1 tablet by mouth Daily As Needed (swelling.)., Disp: 30 tablet, Rfl: 2  •  ipratropium-albuterol (DUO-NEB) 0.5-2.5 mg/3 ml nebulizer, Take 3 mL by nebulization Daily., Disp: , Rfl:   •  isosorbide mononitrate (IMDUR) 60 MG 24 hr tablet, Take 1.5 tablets by mouth Every Morning. (Patient taking differently: Take 60 mg by mouth Every Morning.), Disp: 45 tablet, Rfl: 11  •  losartan (COZAAR) 50 MG tablet, Take 1 tablet by mouth Daily. (Patient taking differently: Take 100 mg by mouth Daily. 2 tablets at bedtime), Disp: 90 tablet, Rfl: 3  •  nitroglycerin (NITROSTAT) 0.4 MG SL tablet, Place 1 tablet under the tongue Every 5 (Five) Minutes As Needed for Chest Pain. Take no more than 3 doses in 15 minutes., Disp: 30 tablet, Rfl: 6  •  O2 (OXYGEN), Inhale 2 L/min Daily., Disp: , Rfl:   •  omeprazole (PriLOSEC) 20 MG capsule, Take 20 mg by mouth Every Morning., Disp: , Rfl:   •  predniSONE (DELTASONE) 10 MG tablet, Take 1 mg by " "mouth Daily., Disp: , Rfl:   •  Probiotic Product ([facility]) capsule, Take 1 capsule by mouth Every Morning., Disp: , Rfl:   •  propranolol LA (INDERAL LA) 80 MG 24 hr capsule, Take 1 capsule by mouth 2 (Two) Times a Day. (Patient taking differently: Take 120 mg by mouth 2 (Two) Times a Day.), Disp: 60 capsule, Rfl: 6  •  traMADol (ULTRAM) 50 MG tablet, Take 50 mg by mouth 3 (Three) Times a Day. TAKES 2 50MG TABLETS 3 TIMES DAILY, Disp: , Rfl:     History of Present Illness     Pt presents for follow up of atrial fibrillation and HTN.  Since we last saw the pt, pt denies any awareness of AF episodes. Daughter states that there are times when she is completely drained. She has chronic SOB and wears O2 N/C. She has had some fluid retention and daughter wonders if it is the Norvasc. She has had some CP episodes in which she takes NTG with relief. She takes Imdur and was prescribed 90 mg daily but only takes 60 mg daily.  Denies any hospitalizations, ER visits, bleeding, or TIA/CVA symptoms. She is having issues with her BP. + edema with norvasc    ROS:  General: +  fatigue, No weight gain or loss  Cardiovascular:  Denies CP, PND, syncope, near syncope, + edema No palpitations.  Pulmonary:  + DENNIS, cough, or wheezing      Vitals:    07/29/20 1527   BP: 146/80   BP Location: Left arm   Patient Position: Sitting   Pulse: 69   Temp: 98.9 °F (37.2 °C)   SpO2: 95%   Weight: 80.3 kg (177 lb)   Height: 165.1 cm (65\")     Body mass index is 29.45 kg/m².  PE:  General: NAD. A & O x 3   Neck: no JVD, no carotid bruits, no TM  Heart RRR, NL S1, S2, S4 present, no rubs, murmurs  Lungs: CTA, no wheezes, rhonchi, or rales  Abd: soft, non-tender, NL BS  Ext: No musculoskeletal deformities, + edema, No cyanosis, or clubbing  Psych: normal mood and affect    Diagnostic Data:    PM Manual Interrogation: normal function. 86% RA paced. 3% RV paced. 8.8% atrial fibrillation. 9.2-9.8 years on battery.       ECG 12 " Lead  Date/Time: 7/29/2020 4:28 PM  Performed by: Ricardo Cross MD  Authorized by: Ricardo Cross MD   Comparison: compared with previous ECG from 6/9/2020  Similar to previous ECG  Rhythm: sinus rhythm and paced  BPM: 69              1. Paroxysmal atrial fibrillation (CMS/HCC)    2. Coronary artery disease of native artery of native heart with stable angina pectoris (CMS/HCC)    3. Sick sinus syndrome (CMS/HCC)    4. Essential hypertension          Plan:  1. PAF: Tikosyn, Stable EKG. QTc 8% AF overall  2. CAD: class II-III angina symptoms   3. SSS: PPM, Normal function.   4. HTN: BB, Cozaar, Imdur. Stop Norvasc. Try Cardura 1/2 dose of 0.1 mg po qhs      F/up in 6 months    Scribed for Ricardo Cross MD by Bridget Kerr PA-C. 7/29/2020  16:28     I, Ricardo Cross MD, personally performed the services described in this documentation as scribed by the above named individual in my presence, and it is both accurate and complete.  7/29/2020  16:41

## 2020-08-18 ENCOUNTER — TELEPHONE (OUTPATIENT)
Dept: CARDIOLOGY | Facility: CLINIC | Age: 85
End: 2020-08-18

## 2020-08-18 NOTE — TELEPHONE ENCOUNTER
Patients daughter called to state that during last visit with GFT, patient was switched to clonidine 0.1 mg at bedtime. Her daughter felt that this was not controlling her BP and increased patient to 0.2 mg at night. The patients BP was averaging systolic 170-180's.  At this time, pts daughter placed patient back on Amlodipine 5 mg. Patient BP averaging 120-130s on Amlodipine, however, patient has extreme swelling and SOA on this medication. Patient is taking 100 mg Losartan at bedtime as well.       Patients daughter would like recommendations on what other drug they can try to control patients BP. They have previously tried Cardura as well. Please advise.

## 2020-08-18 NOTE — TELEPHONE ENCOUNTER
Stay on amlodipine since BP better (is the better BP with the clonidine also at night?). Take Lasix 20mg daily as opposed to PRN. Check BMP in 1 week.

## 2020-08-19 NOTE — TELEPHONE ENCOUNTER
Patient daughter contacted to review recommendations. Daughter states that when the patient had previously taken Lasix 20 mg, it did not help the swelling and dropped her BP to 108 systolic and the patient was symptomatic (this was with taking the Norvasc as well). The daughter feels that the Lasix is too strong for her BP, not effective and is seeking additional recommendations.     Please advise.

## 2020-08-21 NOTE — TELEPHONE ENCOUNTER
Per MJS, pt to keep medications same and come into office on Wednesday morning for appt. Pts daughter to call back to verify appt time.

## 2020-08-24 NOTE — TELEPHONE ENCOUNTER
Patients daughter returned call. They would like appt with MJS on Wednesday @ 9am as previously recommended. Will have scheduling make appt.

## 2020-08-26 ENCOUNTER — OFFICE VISIT (OUTPATIENT)
Dept: CARDIOLOGY | Facility: CLINIC | Age: 85
End: 2020-08-26

## 2020-08-26 VITALS
DIASTOLIC BLOOD PRESSURE: 74 MMHG | WEIGHT: 176.6 LBS | HEIGHT: 65 IN | SYSTOLIC BLOOD PRESSURE: 126 MMHG | HEART RATE: 80 BPM | BODY MASS INDEX: 29.42 KG/M2 | OXYGEN SATURATION: 95 % | TEMPERATURE: 97.7 F

## 2020-08-26 DIAGNOSIS — I48.0 PAROXYSMAL ATRIAL FIBRILLATION (HCC): ICD-10-CM

## 2020-08-26 DIAGNOSIS — R06.02 SHORTNESS OF BREATH: ICD-10-CM

## 2020-08-26 DIAGNOSIS — I65.23 CAROTID STENOSIS, ASYMPTOMATIC, BILATERAL: ICD-10-CM

## 2020-08-26 DIAGNOSIS — I10 ESSENTIAL HYPERTENSION: ICD-10-CM

## 2020-08-26 DIAGNOSIS — I25.118 CORONARY ARTERY DISEASE OF NATIVE ARTERY OF NATIVE HEART WITH STABLE ANGINA PECTORIS (HCC): Primary | ICD-10-CM

## 2020-08-26 DIAGNOSIS — I73.9 PERIPHERAL VASCULAR DISEASE (HCC): ICD-10-CM

## 2020-08-26 PROCEDURE — 99214 OFFICE O/P EST MOD 30 MIN: CPT | Performed by: INTERNAL MEDICINE

## 2020-08-26 RX ORDER — CLONIDINE HYDROCHLORIDE 0.1 MG/1
0.1 TABLET ORAL NIGHTLY
Qty: 30 TABLET | Refills: 11 | Status: SHIPPED | OUTPATIENT
Start: 2020-08-26 | End: 2020-01-01

## 2020-08-26 NOTE — PROGRESS NOTES
[facility] AT [address], [address]  [place], [place], [address] [phone]  WWW.[facility]           OUTPATIENT CLINIC FOLLOW UP NOTE    Patient Care Team:  Patient Care Team:  Saige Tobias MD as PCP - General (Internal Medicine)  Nathanael Davis MD as PCP - Claims Attributed  Dex Kelly MD as Consulting Physician (Cardiology)  Ricardo Cross MD as Consulting Physician (Cardiology)    Subjective:      Chief Complaint   Patient presents with   • Precordial chest pain       HPI:    Chary Boucher is a 86 y.o. female.  Cardiac problem list:  1. Coronary artery disease:  a. Left heart catheterization 2002, multi-vessel coronary disease/medical therapy.  b. Stress Cardiolite 2004, no ischemia.  c. Chest pain/left heart catheterization, December 2008, Dr. Mendoza revealing significant stenosis treated with drug eluting stent. Normal LV function/”spasm” right coronary artery (catheter induced).  d. Stress Cardiolite, 07/27/2010: Mild anterior ischemia with questionable artifact. Normal LV size and function.   e. Lexiscan Cardiolite, 03/25/2015 showing left ventricular ejection fraction 75%, no ischemia.   f. [facility] Dr Araujo with PTCA/stenting of LAD and LCFX with ESTEPHANIA 4/2017  2. Peripheral vascular disease:  a. Severe right common iliac stenosis.  b. Status post successful PTCA and placement of stent, December 2008.  c. Abdominal aortic aneurysm  3. Carotid stenosis: Status-post PTCA and stenting of the right ICA, 09/25/2008.   4. Atrial fibrillation:  a. Tikosyn therapy 10/25/2007.  b. Medtronic dual chamber pacemaker.  c. Generator change ([employer]) 10/10/17  5. Essential Hypertension.  6. Dyslipidemia.  7. Degenerative joint disease/arthritis.  8. GERD.  9. Osteoporosis.  10. Possible abdominal aortic aneurysm:  a. Ultrasound of the abdomen on 03/18/2015 with size measured at 2.3 centimeters.  11. COPD/Pulmonary Fibrosis  12. Tremor, on  propranolol    The patient presents today for follow-up.      The patient has been having labile BPs and intermittent swelling despite titrations of BP meds and Lasix at our direction over the phone. Acute on chronic. Mild to moderate in severity. Brought in to clinic early to re-assess    Current home regimen includes propranolol 120 mg twice daily, losartan 100 mg nightly, amlodipine 5 mg daily.  Intermittent severe bilateral lower extremity swelling with amlodipine.  Patient's daughter has images on her phone to show this.  Lasix low-dose intermittently/partially helpful.  Today with mild swelling bilateral    Review of Systems:  Positive for dyspnea, lower extremity edema  Negative for exertional chest pain, orthopnea, PND,  palpitations, lightheadedness, syncope.     PFSH:  Patient Active Problem List   Diagnosis   • Coronary artery disease of native artery of native heart with stable angina pectoris (CMS/HCC)   • Peripheral vascular disease (CMS/HCC)   • Paroxysmal atrial fibrillation (CMS/HCC)   • Essential hypertension   • Dyslipidemia   • GERD (gastroesophageal reflux disease)   • Osteoporosis   • Abdominal aortic aneurysm (CMS/HCC)   • Bradycardia   • Hypertensive heart disease   • TIA (transient ischemic attack)   • CVA (cerebral vascular accident) (CMS/HCC)   • Syncope   • COPD (chronic obstructive pulmonary disease) (CMS/HCC)   • Chest pain   • Sick sinus syndrome (CMS/HCC)   • Carotid stenosis, asymptomatic, bilateral         Current Outpatient Medications:   •  apixaban (ELIQUIS) 2.5 MG tablet tablet, Take 2.5 mg by mouth 2 (Two) Times a Day., Disp: , Rfl:   •  aspirin 81 MG EC tablet, Take 81 mg by mouth Daily., Disp: , Rfl:   •  atorvastatin (LIPITOR) 40 MG tablet, Take 40 mg by mouth Every Night., Disp: , Rfl:   •  busPIRone (BUSPAR) 5 MG tablet, Take 5 mg by mouth 2 (Two) Times a Day., Disp: , Rfl:   •  Calcium Carb-Cholecalciferol (CALCIUM 600 + D PO), Take 1 tablet by mouth Every Night., Disp: ,  "Rfl:   •  cloNIDine (CATAPRES) 0.1 MG tablet, Take 1 tablet by mouth Every Night., Disp: 30 tablet, Rfl: 11  •  dofetilide (TIKOSYN) 125 MCG capsule, Take 1 capsule by mouth 2 (Two) Times a Day., Disp: 180 capsule, Rfl: 3  •  escitalopram (LEXAPRO) 10 MG tablet, Take 10 mg by mouth Every Morning., Disp: , Rfl:   •  furosemide (LASIX) 20 MG tablet, Take 1 tablet by mouth Daily As Needed (swelling.)., Disp: 30 tablet, Rfl: 2  •  ipratropium-albuterol (DUO-NEB) 0.5-2.5 mg/3 ml nebulizer, Take 3 mL by nebulization Daily., Disp: , Rfl:   •  isosorbide mononitrate (IMDUR) 60 MG 24 hr tablet, Take 1.5 tablets by mouth Every Morning. (Patient taking differently: Take 60 mg by mouth Every Morning.), Disp: 45 tablet, Rfl: 11  •  losartan (COZAAR) 50 MG tablet, Take 1 tablet by mouth Daily., Disp: 90 tablet, Rfl: 3  •  nitroglycerin (NITROSTAT) 0.4 MG SL tablet, Place 1 tablet under the tongue Every 5 (Five) Minutes As Needed for Chest Pain. Take no more than 3 doses in 15 minutes., Disp: 30 tablet, Rfl: 6  •  O2 (OXYGEN), Inhale 2 L/min Daily., Disp: , Rfl:   •  omeprazole (PriLOSEC) 20 MG capsule, Take 20 mg by mouth Every Morning., Disp: , Rfl:   •  predniSONE (DELTASONE) 10 MG tablet, Take 5 mg by mouth Daily., Disp: , Rfl:   •  Probiotic Product ([employer]) capsule, Take 1 capsule by mouth Every Morning., Disp: , Rfl:   •  propranolol LA (INDERAL LA) 80 MG 24 hr capsule, Take 1 capsule by mouth 2 (Two) Times a Day. (Patient taking differently: Take 120 mg by mouth 2 (Two) Times a Day.), Disp: 60 capsule, Rfl: 6  •  traMADol (ULTRAM) 50 MG tablet, Take 50 mg by mouth 3 (Three) Times a Day. TAKES 2 50MG TABLETS 3 TIMES DAILY, Disp: , Rfl:     Allergies   Allergen Reactions   • Epinephrine Other (See Comments)     \"SHAKES ALL OVER\"   • Pacerone [Amiodarone] Hallucinations   • Adhesive Tape    • Celebrex [Celecoxib] GI Intolerance     \"GI PAIN\"   • Keflex [Cephalexin] Other (See Comments)     \"UNKNOWN\"   • Niaspan " "[Niacin Er] Other (See Comments)     \"UNKNOWN\"        reports that she quit smoking about 33 years ago. Her smoking use included cigarettes. She quit after 15.00 years of use. She has never used smokeless tobacco.    Past family medical history: No immediate family history of premature coronary artery disease.      Objective:   Physical exam:  /74 (BP Location: Right arm, Patient Position: Sitting)   Pulse 80   Temp 97.7 °F (36.5 °C)   Ht 165.1 cm (65\")   Wt 80.1 kg (176 lb 9.6 oz)   LMP  (LMP Unknown)   SpO2 95%   BMI 29.39 kg/m²   CONSTITUTIONAL: No acute distress  RESPIRATORY: Normal effort on O2/NC. Mild rales bilaterally  CARDIOVASCULAR: Regular rate and rhythm with normal S1 and S2. Without murmur, gallop or rub.  PERIPHERAL VASCULAR: Normal radial pulse. There is mild lower extremity edema bilaterally.  PSYCH: Normal affect and mood     Labs:    BUN   Date Value Ref Range Status   06/09/2020 17 8 - 23 mg/dL Final     Creatinine   Date Value Ref Range Status   06/09/2020 1.26 (H) 0.57 - 1.00 mg/dL Final     Potassium   Date Value Ref Range Status   06/09/2020 3.6 3.5 - 5.2 mmol/L Final     ALT (SGPT)   Date Value Ref Range Status   06/09/2020 14 1 - 33 U/L Final     AST (SGOT)   Date Value Ref Range Status   06/09/2020 18 1 - 32 U/L Final       Lab Results   Component Value Date    CHOL 181 04/26/2017     Lab Results   Component Value Date    TRIG 224 (H) 04/26/2017     Lab Results   Component Value Date    HDL 46 04/26/2017     Lab Results   Component Value Date    LDL 95 04/26/2017     No components found for: LDLDIRECTC    5/22/2020 PCP labs: , BUN 20, Cr 1.37, Sodium 140, Potassium 4.0    Diagnostic Data:    Procedures    Echocardiogram 8/22/19: EF 60-65%, no significant valvular abnormalities    [facility] 4/2017  · Severe CAD involving the mid LAD and mid circumflex.  · There was a discrete, hazy 60% mid LAD stenosis that was found to be functional significant with an iFR 0.85.  The stenosis " is now status post intervention with a Xience Alpine 3.0x18 mm drug-eluting stent  · There was a 70% mid circumflex stenosis that is now status post intervention with a Xience Alpine 2.5 x 23 mm drug-eluting stent    PFTs 2/2020 - Dr Null, pulmonology, moderate reduction in FVC suggestive of restrictive defect, but FEV1/FVC was normal, flow volume loop compatible with restrictive defect    Carotid duplex ultrasound 12/2019, outside facility: Less than 20% proximal ICA stenosis bilaterally, antegrade flow in the vertebral arteries bilaterally    Assessment and Plan:   Chary was seen today for hypertension and atrial fibrillation.    Diagnoses and all orders for this visit:    Dyspnea  Lower extremity edema  -Worsening, intermittent.   -Patient with mildly elevated BNP and creatinine on 5/22/2020 at PCPs office.  -proBNP WNL on 6/9/2020  -Also concern for possible aspiration.  -Continue Lasix 20mg daily PRN    Essential hypertension  CKD  -Continued labile blood pressures at home.  -Swelling with amlodipine but seems to have a good BP response   -Unclear response to clonidine  -No good response when hydralazine was uptitrated to 50 mg twice daily  -Urinary retention with Cardura  -Avoiding regular diuretic therapy due to CKD    -Continue propranolol 120mg BID  -Switching losartan to 50 mg twice daily  -Clonidine 0.1 mg nightly due to nocturnal hypertension secondary to strongly suspect apnea    -If the above approach does not work.  We will switch losartan and clonidine to [place] 20mg nightly (half a 40 mg tablet).  Will need to assess cost    Coronary artery disease involving native coronary artery of native heart without angina pectoris  -Currently without chest pain.  -Continue aspirin, statin, beta-blocker, Imdur.  -Didn't tolerate Ranexa     Sick sinus syndrome (CMS/HCC)  Atrial fibrillation  -Possibly with symptomatic atrial fibrillation, continue Tikosyn and propranolol  -Status post pacemaker, followed by  EP, next appointment with Dr. Cross 7/2020.  -We will have the patient send in a remote transmission.  Her remote interrogation last month was stable with 8 percent mode switching.    Peripheral vascular disease (CMS/HCC)  -Decreased pulses on the right pedal vessels in the past.  -Possible claudication equivalent, but currently stable  -Continue to monitor clinically    Carotid stenosis, asymptomatic, bilateral  -Mild as of 12/2019, continue to monitor clinically.    Restrictive lung disease  Possible sleep apnea  -On home O2, follows up with pulmonology in [place], [place], with Dr. Null  -Patient reports plans to discuss possible sleep apnea evaluation at time of next visit.    - Return in about 3 months (around 11/26/2020) for Next scheduled follow up with Dr Araujo.    Jesus Araujo MD  08/26/20 11:46

## 2020-08-28 ENCOUNTER — TELEPHONE (OUTPATIENT)
Dept: CARDIOLOGY | Facility: CLINIC | Age: 85
End: 2020-08-28

## 2020-08-28 NOTE — TELEPHONE ENCOUNTER
Patients daughter, Zelda, called to report that after 2 days of medication changes (clonidine 0.1 mg at night, Losartan 50 mg daily) the patient seems to be worse. Daughter states that now she is more swollen, especially in her hands and face, has no energy, and will not eat. She also reports that she is not urinating enough. Patient sp02 averaging 89%-91% since yesterday. She denies fever, productive cough.     BP through today: 169/100, 113 systolic, 130 systolic, 105 systolic.     Patients daughter states that the patient has significantly worsened since being in office Wednesday. Daughter states that the patient is miserable and cannot continue like this. She believes the clonidine is still to blame for some symptoms, although she does not wish to stop medications at night due to high BP in the mornings.       Please advise.

## 2020-08-31 NOTE — TELEPHONE ENCOUNTER
Is that on losartan twice daily?  That blood pressure range and her blood pressure medicines do not seem to fully account for all the symptoms that she is having.  Would not explain her persistent swelling (off amlodipine), urination issue, and low oxygen.      Please make have the patient and her daughter contact their primary care doctor. If her shortness of breath worsens or if she is not urinating, she needs to consider taking her to the ER for more thorough evaluation.  Needs a repeat BMP to monitor her kidney function if she is not urinating well.  Would let PCP make this call.  Needs to stay hydrated.    Continue current blood pressure medicines for now

## 2020-09-30 NOTE — PROGRESS NOTES
[facility] AT [address], [address]  [place], [place], [address] [phone]  WWW.[facility]           OUTPATIENT CLINIC FOLLOW UP NOTE    Patient Care Team:  Patient Care Team:  Saige Tobias MD as PCP - General (Internal Medicine)  Nathanael Davis MD as PCP - Claims Attributed  Dex Kelly MD as Consulting Physician (Cardiology)  Ricardo Cross MD as Consulting Physician (Cardiology)    Subjective:      Chief Complaint   Patient presents with   • Atrial Fibrillation       HPI:    Chary Boucher is a 86 y.o. female.  Cardiac problem list:  1. Coronary artery disease:  a. Left heart catheterization 2002, multi-vessel coronary disease/medical therapy.  b. Stress Cardiolite 2004, no ischemia.  c. Chest pain/left heart catheterization, December 2008, Dr. Mendoza revealing significant stenosis treated with drug eluting stent. Normal LV function/”spasm” right coronary artery (catheter induced).  d. Stress Cardiolite, 07/27/2010: Mild anterior ischemia with questionable artifact. Normal LV size and function.   e. Lexiscan Cardiolite, 03/25/2015 showing left ventricular ejection fraction 75%, no ischemia.   f. [facility] Dr Araujo with PTCA/stenting of LAD and LCFX with ESTEPHANIA 4/2017  2. Peripheral vascular disease:  a. Severe right common iliac stenosis.  b. Status post successful PTCA and placement of stent, December 2008.  c. Abdominal aortic aneurysm  3. Carotid stenosis: Status-post PTCA and stenting of the right ICA, 09/25/2008.   4. Atrial fibrillation:  a. Tikosyn therapy 10/25/2007.  b. Medtronic dual chamber pacemaker.  c. Generator change ([employer]) 10/10/17  5. Essential Hypertension.  6. Dyslipidemia.  7. Degenerative joint disease/arthritis.  8. GERD.  9. Osteoporosis.  10. Possible abdominal aortic aneurysm:  a. Ultrasound of the abdomen on 03/18/2015 with size measured at 2.3 centimeters.  11. COPD/Pulmonary Fibrosis  12. Tremor, on propranolol    The  patient presents today for follow-up.      Recently treated at [facility] for acute hypoxic respiratory failure, combination mild COPD/pulmonary fibrosis/pulmonary vascular congestion.  Diuresed, given duo nebs, given steroids, continued on nasal cannula oxygen.  Had acute renal injury with a creatinine of 1.9, that improved back to her baseline of 1.5.    At the time of discharge she was hypotensive.  Blood pressure medicines were held.  Started having increasing blood pressures and occasional tachycardia this past weekend.  They were in touch with the hospitalist who cared for the patient by phone.  She has since resumed propranolol 60 mg twice daily.  Stable shortness of breath, mild lower extremity edema.  Has not needed to take additional torsemide or Lasix since discharge    Review of Systems:  Positive for dyspnea, lower extremity edema  Negative for exertional chest pain, orthopnea, PND,  palpitations, lightheadedness, syncope.     PFSH:  Patient Active Problem List   Diagnosis   • Coronary artery disease of native artery of native heart with stable angina pectoris (CMS/HCC)   • Peripheral vascular disease (CMS/HCC)   • Paroxysmal atrial fibrillation (CMS/HCC)   • Essential hypertension   • Dyslipidemia   • GERD (gastroesophageal reflux disease)   • Osteoporosis   • Abdominal aortic aneurysm (CMS/HCC)   • Bradycardia   • Hypertensive heart disease   • TIA (transient ischemic attack)   • CVA (cerebral vascular accident) (CMS/HCC)   • Syncope   • COPD (chronic obstructive pulmonary disease) (CMS/HCC)   • Chest pain   • Sick sinus syndrome (CMS/HCC)   • Carotid stenosis, asymptomatic, bilateral         Current Outpatient Medications:   •  apixaban (ELIQUIS) 2.5 MG tablet tablet, Take 2.5 mg by mouth 2 (Two) Times a Day., Disp: , Rfl:   •  aspirin 81 MG EC tablet, Take 81 mg by mouth Daily., Disp: , Rfl:   •  atorvastatin (LIPITOR) 40 MG tablet, Take 40 mg by mouth Every Night., Disp: ,  "Rfl:   •  busPIRone (BUSPAR) 5 MG tablet, Take 5 mg by mouth 2 (Two) Times a Day., Disp: , Rfl:   •  Calcium Carb-Cholecalciferol (CALCIUM 600 + D PO), Take 1 tablet by mouth Every Night., Disp: , Rfl:   •  cloNIDine (CATAPRES) 0.1 MG tablet, Take 1 tablet by mouth Every Night., Disp: 30 tablet, Rfl: 11  •  dofetilide (TIKOSYN) 125 MCG capsule, Take 1 capsule by mouth 2 (Two) Times a Day., Disp: 180 capsule, Rfl: 3  •  escitalopram (LEXAPRO) 10 MG tablet, Take 10 mg by mouth Every Morning., Disp: , Rfl:   •  ipratropium-albuterol (DUO-NEB) 0.5-2.5 mg/3 ml nebulizer, Take 3 mL by nebulization Daily., Disp: , Rfl:   •  isosorbide mononitrate (IMDUR) 60 MG 24 hr tablet, Take 1.5 tablets by mouth Every Morning. (Patient taking differently: Take 60 mg by mouth Every Morning.), Disp: 45 tablet, Rfl: 11  •  nitroglycerin (NITROSTAT) 0.4 MG SL tablet, Place 1 tablet under the tongue Every 5 (Five) Minutes As Needed for Chest Pain. Take no more than 3 doses in 15 minutes., Disp: 30 tablet, Rfl: 6  •  O2 (OXYGEN), Inhale 2 L/min Daily., Disp: , Rfl:   •  omeprazole (PriLOSEC) 20 MG capsule, Take 20 mg by mouth Every Morning., Disp: , Rfl:   •  predniSONE (DELTASONE) 10 MG tablet, Take 5 mg by mouth Daily., Disp: , Rfl:   •  Probiotic Product ([employer]) capsule, Take 1 capsule by mouth Every Morning., Disp: , Rfl:   •  propranolol LA (INDERAL LA) 80 MG 24 hr capsule, Take 1 capsule by mouth 2 (Two) Times a Day. (Patient taking differently: Take 60 mg by mouth 2 (Two) Times a Day.), Disp: 60 capsule, Rfl: 6  •  traMADol (ULTRAM) 50 MG tablet, Take 50 mg by mouth 3 (Three) Times a Day. TAKES 2 50MG TABLETS 3 TIMES DAILY, Disp: , Rfl:   •  furosemide (LASIX) 20 MG tablet, Take 1 tablet by mouth Daily As Needed (swelling.)., Disp: 30 tablet, Rfl: 2    Allergies   Allergen Reactions   • Epinephrine Other (See Comments)     \"SHAKES ALL OVER\"   • Pacerone [Amiodarone] Hallucinations   • Adhesive Tape    • Celebrex " "[Celecoxib] GI Intolerance     \"GI PAIN\"   • Keflex [Cephalexin] Other (See Comments)     \"UNKNOWN\"   • Niaspan [Niacin Er] Other (See Comments)     \"UNKNOWN\"        reports that she quit smoking about 33 years ago. Her smoking use included cigarettes. She quit after 15.00 years of use. She has never used smokeless tobacco.    Past family medical history: No immediate family history of premature coronary artery disease.      Objective:   Physical exam:  /64 (BP Location: Left arm, Patient Position: Sitting)   Pulse 70   Temp 96.6 °F (35.9 °C)   Ht 165.1 cm (65\")   Wt 79.4 kg (175 lb)   LMP  (LMP Unknown)   SpO2 95%   BMI 29.12 kg/m²   CONSTITUTIONAL: No acute distress  RESPIRATORY: Normal effort on O2/NC. Mild rales bilaterally  CARDIOVASCULAR: Regular rate and rhythm with normal S1 and S2. Without murmur, gallop or rub.  PERIPHERAL VASCULAR: Normal radial pulse. There is mild lower extremity edema bilaterally.  PSYCH: Normal affect and mood     Labs:    BUN   Date Value Ref Range Status   06/09/2020 17 8 - 23 mg/dL Final     Creatinine   Date Value Ref Range Status   06/09/2020 1.26 (H) 0.57 - 1.00 mg/dL Final     Potassium   Date Value Ref Range Status   06/09/2020 3.6 3.5 - 5.2 mmol/L Final     ALT (SGPT)   Date Value Ref Range Status   06/09/2020 14 1 - 33 U/L Final     AST (SGOT)   Date Value Ref Range Status   06/09/2020 18 1 - 32 U/L Final       Lab Results   Component Value Date    CHOL 181 04/26/2017     Lab Results   Component Value Date    TRIG 224 (H) 04/26/2017     Lab Results   Component Value Date    HDL 46 04/26/2017     Lab Results   Component Value Date    LDL 95 04/26/2017     No components found for: LDLDIRECTC    5/22/2020 PCP labs: , BUN 20, Cr 1.37, Sodium 140, Potassium 4.0    9/2020 outlying facility labs: Potassium 3.9, creatinine 1.5, hemoglobin 14.6, platelets 244    Diagnostic Data:      ECG 12 Lead    Date/Time: 9/30/2020 3:17 PM  Performed by: Jesus Araujo, " MD  Authorized by: Jesus Araujo MD   Comparison: compared with previous ECG from 7/29/2020  Similar to previous ECG  Rhythm: paced  QRS axis: left  Comments: Atrial paced, possible lateral infarct, heart rate 71, QRS 70, QTc 449            EKG, outlying facility, 9/19/2020: A. fib with PVCs versus aberrancy, heart rate 92, QRS 80, QTc 521    Echo, outlying facility, 9/2020: EF 55 to 60%, grade 1 diastolic dysfunction, aortic valve sclerosis without stenosis, mild mitral annular calcification, normal left atrial size, normal RV and RA size/function, mild tricuspid regurgitation with a peak regurgitation velocity of 2.8 m/s    Echocardiogram 8/22/19: EF 60-65%, no significant valvular abnormalities    [facility] 4/2017  · Severe CAD involving the mid LAD and mid circumflex.  · There was a discrete, hazy 60% mid LAD stenosis that was found to be functional significant with an iFR 0.85.  The stenosis is now status post intervention with a Xience Alpine 3.0x18 mm drug-eluting stent  · There was a 70% mid circumflex stenosis that is now status post intervention with a Xience Alpine 2.5 x 23 mm drug-eluting stent    PFTs 2/2020 - Dr Null, pulmonology, moderate reduction in FVC suggestive of restrictive defect, but FEV1/FVC was normal, flow volume loop compatible with restrictive defect    Carotid duplex ultrasound 12/2019, outside facility: Less than 20% proximal ICA stenosis bilaterally, antegrade flow in the vertebral arteries bilaterally    Assessment and Plan:   Chary was seen today for hypertension and atrial fibrillation.    Diagnoses and all orders for this visit:    Dyspnea  Lower extremity edema  -Stable, intermittent.   -Continue Lasix daily PRN    Essential hypertension  CKD  -Continued labile blood pressures at home.  -Swelling with amlodipine but had a good BP response   -Side effects with clonidine  -Previous poor response with hydralazine when uptitrated to 50 mg twice daily; restarted on hydralazine during a  hospital admission 9/2020, [facility]  -Urinary retention with Cardura  -Occasional JOSIE, losartan discontinued    -Switching propranolol ER to short acting 60 mg every morning and 120 mg every afternoon    -Next option will be to reintroduce hydralazine    Coronary artery disease involving native coronary artery of native heart without angina pectoris  -Currently without chest pain.  -Continue aspirin, statin, beta-blocker, Imdur.  -Didn't tolerate Ranexa     Sick sinus syndrome (CMS/HCC)  Atrial fibrillation  -Possibly with symptomatic atrial fibrillation, continue Tikosyn and propranolol  -Status post pacemaker, followed by EP    Peripheral vascular disease (CMS/HCC)  -Decreased pulses on the right pedal vessels in the past.  -Possible claudication equivalent, but currently stable  -Continue to monitor clinically    Carotid stenosis, asymptomatic, bilateral  -Mild as of 12/2019, continue to monitor clinically.    Restrictive lung disease  Possible sleep apnea  -On home O2, follows up with pulmonology in [place], [place], with Dr. Null  -Patient reports plans to discuss possible sleep apnea evaluation at time of next visit.    - Return for Follow-up in 8 to 12 weeks, telephone appointment.    Jesus Araujo MD  09/30/20 14:08 EDT

## 2020-10-05 NOTE — TELEPHONE ENCOUNTER
Patients daughter called to report that patients morning BP still remaining 185//114, HR 60-70 despite increasing evening dose of propranolol to 160 mg. Daughter states that patient's BP drops 30-40 pts systolic throughout the day. Patient also has mild lower extremity swelling. She did report taking a Lasix on Saturday and this helped.     Please advise on next step with BP. Thanks.

## 2020-10-05 NOTE — TELEPHONE ENCOUNTER
At her last visit, we discussed restarting hydralazine if this happens. Let's reintroduce hydralazine at just 25mg nightly for now and see how that goes. Needs to discuss a sleep study.  Previously advised that she talk to her pulmonologist about this.  If they are having difficulty with that office, would refer her to our sleep center

## 2020-10-06 NOTE — TELEPHONE ENCOUNTER
Patient daughter contacted with recommendations per MJS. Patient to begin Hydralazine 25 mg nightly.     Daughter states that they have not had the sleep study because they are hesitant because of COVID19. Patients daughter states that they will consider a referral to our sleep center here and call the office if they would like order placed.

## 2020-11-04 NOTE — TELEPHONE ENCOUNTER
Patient's daughter called regarding taking a chewable baby ASA with a SL nitro when needed. Per KERRY SilvaN : it is not needed.

## 2020-11-10 NOTE — TELEPHONE ENCOUNTER
Patients daughter returned call. Patients BP has been elevated in the mornings averaging 180-200/ before taking her morning medications. After taking her medications, her BP averages around 120//85.     Patients daughter is requesting a medication adjustment to help with morning blood pressures. Daughter would also like referral to sleep medicine to evaluate for possible sleep apnea. Referral placed.     Please advise.

## 2020-11-30 PROBLEM — G47.33 OSA (OBSTRUCTIVE SLEEP APNEA): Status: ACTIVE | Noted: 2020-01-01

## 2020-11-30 PROBLEM — R09.02 HYPOXEMIA REQUIRING SUPPLEMENTAL OXYGEN: Status: ACTIVE | Noted: 2020-01-01

## 2020-11-30 PROBLEM — G47.19 EXCESSIVE DAYTIME SLEEPINESS: Status: ACTIVE | Noted: 2020-01-01

## 2020-11-30 PROBLEM — R06.83 SNORING: Status: ACTIVE | Noted: 2020-01-01

## 2020-11-30 PROBLEM — Z99.81 HYPOXEMIA REQUIRING SUPPLEMENTAL OXYGEN: Status: ACTIVE | Noted: 2020-01-01

## 2020-11-30 NOTE — PROGRESS NOTES
Chary Boucher is a 87 y.o. female.   Chief Complaint   Patient presents with   • Sleeping Problem       HPI     87 y.o. female seen in consultation at the request of Jesus Araujo MD for evaluation of the above.     She has longstanding history of coronary artery disease.  She has a history of multiple coronary stents.  She has preserved LV function.  She has peripheral vascular disease and carotid vascular disease.  She also has a longstanding history of atrial fibrillation and has a history of a dual-chamber pacemaker placed.  She also has history of COPD and respiratory failure.  She has been hospitalized for this in [place] and is followed by a pulmonologist there.  She is on oxygen 2 L 24 hours a day.  She is also on low-dose steroids.  She also carries a diagnosis of pulmonary fibrosis though this was not noted on imaging studies here in the past.    In any event, recently she is began having problems with very high blood pressures in the morning despite multiple medications.  Her blood pressure during the day is acceptable and in fact can be low at times.    Furthermore she has a history of very long sleep time.  She will sleep 11-12 hours.  She often does not even wake up during this period of time.  Despite this amount of sleep she remains sleepy for the remainder of the day.  Her [place] Sleepiness Scale is 16.  Her daughter has noted unusual breathing patterns and occasional labored breathing.  She does snore but is not excessively loud according to her daughter.  She does have a dry mouth.    She was referred due to possible diagnose obstructive sleep apnea and its effects on her blood pressure.    Further details are as follows:    D[place] Scale is: 16/24    Estimated average amount of sleep per night: 11 hrs  Number of times she wakes up at night: 0-2  Difficulty falling back asleep: no  It usually takes 25 minutes to go to sleep.  She feels sleepy upon waking up: yes  Rotating or night shift  work: no    Drowsiness/Sleepiness:  She exhibits the following:  excessive daytime sleepiness    Snoring/Breathing:  She exhibits the following:  loud snoring  awoken with dry mouth    Reflux:  She describes the following:  takes medication for reflux    Narcolepsy:  She exhibits the following:  none    RLS/PLMs:  She describes the following:  none    Insomnia:  She describes the following:  frequent awakenings    Parasomnia:  She exhibits the following:  none    Weight:  Weight change in the last year:  gain: 5 lbs      The patient's relevant past medical, surgical, family, and social history reviewed and updated in Epic as appropriate.    Current medications are:   Current Outpatient Medications:   •  apixaban (ELIQUIS) 2.5 MG tablet tablet, Take 2.5 mg by mouth 2 (Two) Times a Day., Disp: , Rfl:   •  aspirin 81 MG EC tablet, Take 81 mg by mouth Daily., Disp: , Rfl:   •  atorvastatin (LIPITOR) 40 MG tablet, Take 40 mg by mouth Every Night., Disp: , Rfl:   •  busPIRone (BUSPAR) 5 MG tablet, Take 5 mg by mouth 2 (Two) Times a Day., Disp: , Rfl:   •  Calcium Carb-Cholecalciferol (CALCIUM 600 + D PO), Take 1 tablet by mouth Every Night., Disp: , Rfl:   •  cloNIDine (CATAPRES) 0.1 MG tablet, Take 1 tablet by mouth Every Night., Disp: 30 tablet, Rfl: 11  •  dofetilide (TIKOSYN) 125 MCG capsule, TAKE 1 CAPSULE TWICE DAILY, Disp: 180 capsule, Rfl: 3  •  escitalopram (LEXAPRO) 10 MG tablet, Take 10 mg by mouth Every Morning., Disp: , Rfl:   •  furosemide (LASIX) 20 MG tablet, Take 1 tablet by mouth Daily As Needed (swelling.)., Disp: 30 tablet, Rfl: 2  •  hydrALAZINE (APRESOLINE) 25 MG tablet, Take 25 mg by mouth Every Night., Disp: , Rfl:   •  ipratropium-albuterol (DUO-NEB) 0.5-2.5 mg/3 ml nebulizer, Take 3 mL by nebulization Daily., Disp: , Rfl:   •  isosorbide mononitrate (IMDUR) 60 MG 24 hr tablet, Take 1.5 tablets by mouth Every Morning. (Patient taking differently: Take 60 mg by mouth Every Morning.), Disp: 45  "tablet, Rfl: 11  •  nitroglycerin (NITROSTAT) 0.4 MG SL tablet, Place 1 tablet under the tongue Every 5 (Five) Minutes As Needed for Chest Pain. Take no more than 3 doses in 15 minutes., Disp: 30 tablet, Rfl: 6  •  O2 (OXYGEN), Inhale 2 L/min Daily., Disp: , Rfl:   •  omeprazole (PriLOSEC) 20 MG capsule, Take 20 mg by mouth Every Morning., Disp: , Rfl:   •  predniSONE (DELTASONE) 10 MG tablet, Take 5 mg by mouth Daily., Disp: , Rfl:   •  Probiotic Product ([employer]) capsule, Take 1 capsule by mouth Every Morning., Disp: , Rfl:   •  propranolol (INDERAL) 60 MG tablet, Take 60 mg in the morning, take 120 mg in the evening, Disp: 90 tablet, Rfl: 5  •  traMADol (ULTRAM) 50 MG tablet, Take 50 mg by mouth 3 (Three) Times a Day. TAKES 2 50MG TABLETS 3 TIMES DAILY, Disp: , Rfl: .    Review of Systems    Review of Systems  ROS documented in patient questionnaire ×14 systems.  Reviewed with patient.  Otherwise negative except as noted in HPI.    Physical Exam    Blood pressure 163/79, pulse 82, height 165.1 cm (65\"), SpO2 90 %. Body mass index is 29.12 kg/m².    Physical Exam  Vitals signs and nursing note reviewed.   Constitutional:       Appearance: Normal appearance. She is well-developed.   HENT:      Head: Normocephalic and atraumatic.      Nose: Nose normal.      Mouth/Throat:      Mouth: Mucous membranes are dry.      Pharynx: No oropharyngeal exudate.      Comments: Class IV airway  Eyes:      General: No scleral icterus.     Conjunctiva/sclera: Conjunctivae normal.   Neck:      Thyroid: No thyromegaly.      Trachea: No tracheal deviation.   Cardiovascular:      Rate and Rhythm: Normal rate and regular rhythm.      Heart sounds: No murmur. No friction rub. No gallop.    Pulmonary:      Effort: Pulmonary effort is normal. No respiratory distress.      Breath sounds: No wheezing or rales.   Musculoskeletal: Normal range of motion.         General: No deformity.   Skin:     General: Skin is warm and dry.     "  Findings: No rash.   Neurological:      Mental Status: She is alert and oriented to person, place, and time.   Psychiatric:         Behavior: Behavior normal.         Thought Content: Thought content normal.         ASSESSMENT:    Problem List Items Addressed This Visit        Pulmonary Problems    COPD (chronic obstructive pulmonary disease) (CMS/HCC) - Primary    Hypoxemia requiring supplemental oxygen    Relevant Orders    Polysomnography 4 or More Parameters    ALCIDES (obstructive sleep apnea) - possible    Relevant Orders    Polysomnography 4 or More Parameters    Snoring    Relevant Orders    Polysomnography 4 or More Parameters       Other    Excessive daytime sleepiness    Relevant Orders    Polysomnography 4 or More Parameters        87-year-old female with multiple medical problems as delineated above.  She does have chronic coronary disease, cardiac arrhythmias with prior pacemaker placement, and COPD on home O2.  She needs help with activities of daily living.  She is accompanied by her daughter today.    PLAN:    1. I think she could very well have untreated obstructive sleep apnea in addition to her other issues.  To properly make this diagnosis she would need an in lab polysomnogram.  A home study would not be accurate given her nocturnal oxygen use and want to be approved by Medicare in any event.  If she were to come for an in lab polysomnogram her daughter would need to make arrangements to stay with her to assist our technician with her care at night.  2. The patient and her daughter are unsure whether they want to go through with the polysomnogram.  They do understand that with treatment of obstructive sleep apnea that she may have a significant benefit in her quality of life.  They are equally not sure if she would tolerate or acclimate to CPAP or BiPAP therapy in the final analysis.  3. I think they are well-informed as to their options and to the risks and benefits involved in the  decision-making process.  They will let us know if they want to proceed with the polysomnogram and we can proceed with scheduling.    I have reviewed the results of my evaluation and impression and discussed my recommendations in detail with the patient.    Level of Risk Moderate due to: undiagnosed new problem    Signed by  Mahendra Presley MD    November 30, 2020      CC: Saige Tobias MD Shih, Michael, MD

## 2020-12-07 NOTE — PROGRESS NOTES
[facility] AT [address], [address]  [place], [place], [address]    [phone]  [email or web address]           OUTPATIENT CLINIC FOLLOW UP NOTE    Patient Care Team:  Patient Care Team:  Saige Tobias MD as PCP - General (Internal Medicine)  Dex Kelly MD as Consulting Physician (Cardiology)  Ricardo Cross MD as Consulting Physician (Cardiology)    Subjective:      Chief Complaint   Patient presents with   • Coronary artery disease of native artery of native heart wit       HPI:    Chary Boucher is a 87 y.o. female.  Cardiac problem list:  1. Coronary artery disease:  a. Left heart catheterization 2002, multi-vessel coronary disease/medical therapy.  b. Stress Cardiolite 2004, no ischemia.  c. Chest pain/left heart catheterization, December 2008, Dr. Mendoza revealing significant stenosis treated with drug eluting stent. Normal LV function/”spasm” right coronary artery (catheter induced).  d. Stress Cardiolite, 07/27/2010: Mild anterior ischemia with questionable artifact. Normal LV size and function.   e. Lexiscan Cardiolite, 03/25/2015 showing left ventricular ejection fraction 75%, no ischemia.   f. [facility] Dr Araujo with PTCA/stenting of LAD and LCFX with ESTEPHANIA 4/2017  2. Peripheral vascular disease:  a. Severe right common iliac stenosis.  b. Status post successful PTCA and placement of stent, December 2008.  c. Abdominal aortic aneurysm  3. Carotid stenosis: Status-post PTCA and stenting of the right ICA, 09/25/2008.   4. Atrial fibrillation:  a. Tikosyn therapy 10/25/2007.  b. Medtronic dual chamber pacemaker.  c. Generator change ([facility]) 10/10/17  5. Essential Hypertension.  6. Dyslipidemia.  7. Degenerative joint disease/arthritis.  8. GERD.  9. Osteoporosis.  10. Possible abdominal aortic aneurysm:  a. Ultrasound of the abdomen on 03/18/2015 with size measured at 2.3 centimeters.  11. COPD/Pulmonary Fibrosis  12. Tremor, on propranolol    The  patient presents today for follow-up by telephone    To the patient's daughters were in on her phone call today.    Patient continues to have intermittent variable blood pressure.  Mostly high.  Higher in the mornings and better in the afternoons but blood pressure climbs again by the evening.  Has not been at goal.  Fatigue.  Chronic shortness of air.    Review of Systems:  Positive for dyspnea, fatigue  Negative for exertional chest pain, orthopnea, PND,  palpitations, lightheadedness, syncope.     PFSH:  Patient Active Problem List   Diagnosis   • Coronary artery disease of native artery of native heart with stable angina pectoris (CMS/HCC)   • Peripheral vascular disease (CMS/HCC)   • Paroxysmal atrial fibrillation (CMS/HCC)   • Essential hypertension   • Dyslipidemia   • GERD (gastroesophageal reflux disease)   • Osteoporosis   • Abdominal aortic aneurysm (CMS/HCC)   • Bradycardia   • Hypertensive heart disease   • TIA (transient ischemic attack)   • CVA (cerebral vascular accident) (CMS/[facility])   • Syncope   • COPD (chronic obstructive pulmonary disease) (CMS/[facility])   • Chest pain   • Sick sinus syndrome (CMS/[facility])   • Carotid stenosis, asymptomatic, bilateral   • Hypoxemia requiring supplemental oxygen   • ALCIDES (obstructive sleep apnea) - possible   • Excessive daytime sleepiness   • Snoring         Current Outpatient Medications:   •  apixaban (ELIQUIS) 2.5 MG tablet tablet, Take 2.5 mg by mouth 2 (Two) Times a Day., Disp: , Rfl:   •  aspirin 81 MG EC tablet, Take 81 mg by mouth Daily., Disp: , Rfl:   •  atorvastatin (LIPITOR) 40 MG tablet, Take 40 mg by mouth Every Night., Disp: , Rfl:   •  busPIRone (BUSPAR) 5 MG tablet, Take 5 mg by mouth 2 (Two) Times a Day., Disp: , Rfl:   •  Calcium Carb-Cholecalciferol (CALCIUM 600 + D PO), Take 1 tablet by mouth Every Night., Disp: , Rfl:   •  calcium citrate-vitamin d (CITRACAL) 200-250 MG-UNIT tablet tablet, Take 1 tablet by mouth Daily., Disp: , Rfl:   •  dofetilide  "(TIKOSYN) 125 MCG capsule, TAKE 1 CAPSULE TWICE DAILY, Disp: 180 capsule, Rfl: 3  •  escitalopram (LEXAPRO) 10 MG tablet, Take 10 mg by mouth Every Morning., Disp: , Rfl:   •  furosemide (LASIX) 20 MG tablet, Take 1 tablet by mouth Daily As Needed (swelling.)., Disp: 30 tablet, Rfl: 2  •  hydrALAZINE (APRESOLINE) 25 MG tablet, Take 25 mg by mouth 2 (Two) Times a Day., Disp: , Rfl:   •  ipratropium-albuterol (DUO-NEB) 0.5-2.5 mg/3 ml nebulizer, Take 3 mL by nebulization Daily., Disp: , Rfl:   •  isosorbide mononitrate (IMDUR) 60 MG 24 hr tablet, Take 1.5 tablets by mouth Every Morning. (Patient taking differently: Take 60 mg by mouth Every Morning.), Disp: 45 tablet, Rfl: 11  •  montelukast (SINGULAIR) 10 MG tablet, Take 10 mg by mouth Daily., Disp: , Rfl:   •  nitroglycerin (NITROSTAT) 0.4 MG SL tablet, Place 1 tablet under the tongue Every 5 (Five) Minutes As Needed for Chest Pain. Take no more than 3 doses in 15 minutes., Disp: 30 tablet, Rfl: 6  •  O2 (OXYGEN), Inhale 2 L/min Daily., Disp: , Rfl:   •  omeprazole (PriLOSEC) 20 MG capsule, Take 20 mg by mouth Every Morning., Disp: , Rfl:   •  predniSONE (DELTASONE) 10 MG tablet, Take 10 mg by mouth Daily., Disp: , Rfl:   •  Probiotic Product ([employer]) capsule, Take 1 capsule by mouth Every Morning., Disp: , Rfl:   •  propranolol (INDERAL) 60 MG tablet, Take 2 tablets by mouth 2 (Two) Times a Day. Take 60 mg in the morning, take 120 mg in the evening, Disp: 90 tablet, Rfl: 5  •  traMADol (ULTRAM) 50 MG tablet, Take 50 mg by mouth 3 (Three) Times a Day. TAKES 2 50MG TABLETS 3 TIMES DAILY, Disp: , Rfl:     Allergies   Allergen Reactions   • Epinephrine Other (See Comments)     \"SHAKES ALL OVER\"   • Pacerone [Amiodarone] Hallucinations   • Adhesive Tape    • Celebrex [Celecoxib] GI Intolerance     \"GI PAIN\"   • Keflex [Cephalexin] Other (See Comments)     \"UNKNOWN\"   • Niaspan [Niacin Er] Other (See Comments)     \"UNKNOWN\"        reports that she quit " "smoking about 33 years ago. Her smoking use included cigarettes. She started smoking about 70 years ago. She has a 15.00 pack-year smoking history. She has never used smokeless tobacco.    Past family medical history: No immediate family history of premature coronary artery disease.      Objective:   Physical exam:  BP (!) 171/105 (BP Location: Left arm, Patient Position: Sitting)   Pulse 82   Ht 165.1 cm (65\")   Wt 79.4 kg (175 lb)   LMP  (LMP Unknown)   SpO2 94%   BMI 29.12 kg/m²   CONSTITUTIONAL: No acute distress    Labs:    BUN   Date Value Ref Range Status   06/09/2020 17 8 - 23 mg/dL Final     Creatinine   Date Value Ref Range Status   06/09/2020 1.26 (H) 0.57 - 1.00 mg/dL Final     Potassium   Date Value Ref Range Status   06/09/2020 3.6 3.5 - 5.2 mmol/L Final     ALT (SGPT)   Date Value Ref Range Status   06/09/2020 14 1 - 33 U/L Final     AST (SGOT)   Date Value Ref Range Status   06/09/2020 18 1 - 32 U/L Final       Lab Results   Component Value Date    CHOL 181 04/26/2017     Lab Results   Component Value Date    TRIG 224 (H) 04/26/2017     Lab Results   Component Value Date    HDL 46 04/26/2017     Lab Results   Component Value Date    LDL 95 04/26/2017     No components found for: LDLDIRECTC    5/22/2020 PCP labs: , BUN 20, Cr 1.37, Sodium 140, Potassium 4.0    9/2020 [facility] labs: Potassium 3.9, creatinine 1.5, hemoglobin 14.6, platelets 244    Diagnostic Data:    Procedures    EKG, [facility], 9/19/2020: A. fib with PVCs versus aberrancy, heart rate 92, QRS 80, QTc 521    Echo, [facility], 9/2020: EF 55 to 60%, grade 1 diastolic dysfunction, aortic valve sclerosis without stenosis, mild mitral annular calcification, normal left atrial size, normal RV and RA size/function, mild tricuspid regurgitation with a peak regurgitation velocity of 2.8 m/s    Echocardiogram 8/22/19: EF 60-65%, no significant valvular abnormalities    [facility] 4/2017  · Severe CAD involving the " mid LAD and mid circumflex.  · There was a discrete, hazy 60% mid LAD stenosis that was found to be functional significant with an iFR 0.85.  The stenosis is now status post intervention with a Xience Alpine 3.0x18 mm drug-eluting stent  · There was a 70% mid circumflex stenosis that is now status post intervention with a Xience Alpine 2.5 x 23 mm drug-eluting stent    PFTs 2/2020 - Dr Null, pulmonology, moderate reduction in FVC suggestive of restrictive defect, but FEV1/FVC was normal, flow volume loop compatible with restrictive defect    Carotid duplex ultrasound 12/2019, outside facility: Less than 20% proximal ICA stenosis bilaterally, antegrade flow in the vertebral arteries bilaterally    Assessment and Plan:   Chary was seen today for hypertension and atrial fibrillation.    Diagnoses and all orders for this visit:    Dyspnea  Lower extremity edema  -Stable, intermittent.   -Continue Lasix daily PRN    Essential hypertension  CKD  -Continued labile blood pressures at home.  -Increasing propranolol to short acting 120 mg twice daily.  Can consider switching back to long-acting, if we believe that she did better on it  -Increase hydralazine to 25 mg twice daily    Medicines with side effects:  -Swelling with amlodipine but had a good BP response   -Side effects with clonidine  -Urinary retention with Cardura  -Occasional JOSIE, losartan discontinued    Coronary artery disease involving native coronary artery of native heart without angina pectoris  -Currently without chest pain.  -Continue aspirin, statin, beta-blocker, Imdur.  -Didn't tolerate Ranexa     Sick sinus syndrome (CMS/HCC)  Atrial fibrillation  -Status post pacemaker, followed by EP  -Possibly with symptomatic atrial fibrillation, continue Tikosyn, increasing propranolol as noted above  -Discussed increasing Tikosyn, but it would warrant a hospitalization which the patient is currently trying to avoid during the Covid pandemic.  If A. fib  persists, will recommend admission for increased Tikosyn, with Dr. Cross    Peripheral vascular disease (CMS/HCC)  -Decreased pulses on the right pedal vessels in the past.  -Possible claudication equivalent, but currently stable  -Continue to monitor clinically    Carotid stenosis, asymptomatic, bilateral  -Mild as of 12/2019, continue to monitor clinically.    Restrictive lung disease  Possible sleep apnea  -On home O2, follows up with pulmonology in [place], [place], with Dr. Null  -Seen by sleep medicine, holding off on sleep study during Covid pandemic    - Return in about 2 weeks (around 12/21/2020) for Telemedicine visit.    This patient has consented to a telehealth visit via telephone. The visit was scheduled as a telephone visit (patient did not have reliable access to a video platform) to comply with patient safety concerns in accordance with CDC recommendations.  All vitals recorded within this visit are reported by the patient.  I spent  25 minutes total on patient care including 16 minutes spent in direct conversation with this patient.       Jesus Araujo MD  12/07/20 13:13 EST

## 2020-12-21 NOTE — PROGRESS NOTES
[facility] AT [address], [address]  [place], [place], [id number] [phone]  [email or web address]           OUTPATIENT CLINIC FOLLOW UP NOTE    Patient Care Team:  Patient Care Team:  Saige Tobias MD as PCP - General (Internal Medicine)  Dex Kelly MD as Consulting Physician (Cardiology)  Ricardo Cross MD as Consulting Physician (Cardiology)    Subjective:      Chief Complaint   Patient presents with   • Coronary Artery Disease       HPI:    Chary Boucher is a 87 y.o. female.  Cardiac problem list:  1. Coronary artery disease:  a. Left heart catheterization 2002, multi-vessel coronary disease/medical therapy.  b. Stress Cardiolite 2004, no ischemia.  c. Chest pain/left heart catheterization, December 2008, Dr. Mendoza revealing significant stenosis treated with drug eluting stent. Normal LV function/”spasm” right coronary artery (catheter induced).  d. Stress Cardiolite, 07/27/2010: Mild anterior ischemia with questionable artifact. Normal LV size and function.   e. Lexiscan Cardiolite, 03/25/2015 showing left ventricular ejection fraction 75%, no ischemia.   f. [facility] Dr Araujo with PTCA/stenting of LAD and LCFX with ESTEPHANIA 4/2017  2. Peripheral vascular disease:  a. Severe right common iliac stenosis.  b. Status post successful PTCA and placement of stent, December 2008.  c. Abdominal aortic aneurysm  3. Carotid stenosis: Status-post PTCA and stenting of the right ICA, 09/25/2008.   4. Atrial fibrillation:  a. Tikosyn therapy 10/25/2007.  b. Medtronic dual chamber pacemaker.  c. Generator change ([facility]) 10/10/17  5. Essential Hypertension.  6. Dyslipidemia.  7. Degenerative joint disease/arthritis.  8. GERD.  9. Osteoporosis.  10. Possible abdominal aortic aneurysm:  a. Ultrasound of the abdomen on 03/18/2015 with size measured at 2.3 centimeters.  11. COPD/Pulmonary Fibrosis  12. Tremor, on propranolol    The patient presents today for follow-up by  telephone    To the patient's daughters were in on her phone call today.    Mornings in 150s-170s/90s. Mid-day 120-130s. Bedtime 160s.    Otherwise patient has some mild lower extremity swelling.  Comes and goes.  Denies chest pain.  Chronic stable dyspnea    Review of Systems:  Positive for dyspnea, fatigue, mild lower extremity swelling  Negative for exertional chest pain, orthopnea, PND,  palpitations, lightheadedness, syncope.     PFSH:  Patient Active Problem List   Diagnosis   • Coronary artery disease of native artery of native heart with stable angina pectoris (CMS/HCC)   • Peripheral vascular disease (CMS/HCC)   • Paroxysmal atrial fibrillation (CMS/HCC)   • Essential hypertension   • Dyslipidemia   • GERD (gastroesophageal reflux disease)   • Osteoporosis   • Abdominal aortic aneurysm (CMS/HCC)   • Bradycardia   • Hypertensive heart disease   • TIA (transient ischemic attack)   • CVA (cerebral vascular accident) (CMS/HCC)   • Syncope   • COPD (chronic obstructive pulmonary disease) (CMS/HCC)   • Chest pain   • Sick sinus syndrome (CMS/[facility])   • Carotid stenosis, asymptomatic, bilateral   • Hypoxemia requiring supplemental oxygen   • ALCIDES (obstructive sleep apnea) - possible   • Excessive daytime sleepiness   • Snoring         Current Outpatient Medications:   •  apixaban (ELIQUIS) 2.5 MG tablet tablet, Take 2.5 mg by mouth 2 (Two) Times a Day., Disp: , Rfl:   •  aspirin 81 MG EC tablet, Take 81 mg by mouth Daily., Disp: , Rfl:   •  atorvastatin (LIPITOR) 40 MG tablet, Take 40 mg by mouth Every Night., Disp: , Rfl:   •  busPIRone (BUSPAR) 5 MG tablet, Take 5 mg by mouth 2 (Two) Times a Day., Disp: , Rfl:   •  Calcium Carb-Cholecalciferol (CALCIUM 600 + D PO), Take 1 tablet by mouth Every Night., Disp: , Rfl:   •  calcium citrate-vitamin d (CITRACAL) 200-250 MG-UNIT tablet tablet, Take 1 tablet by mouth Daily., Disp: , Rfl:   •  dofetilide (TIKOSYN) 125 MCG capsule, TAKE 1 CAPSULE TWICE DAILY, Disp: 180  "capsule, Rfl: 3  •  escitalopram (LEXAPRO) 10 MG tablet, Take 10 mg by mouth Every Morning., Disp: , Rfl:   •  furosemide (LASIX) 20 MG tablet, Take 1 tablet by mouth Daily As Needed (swelling.)., Disp: 30 tablet, Rfl: 2  •  hydrALAZINE (APRESOLINE) 25 MG tablet, Take 25 mg by mouth 2 (Two) Times a Day., Disp: , Rfl:   •  ipratropium-albuterol (DUO-NEB) 0.5-2.5 mg/3 ml nebulizer, Take 3 mL by nebulization Daily., Disp: , Rfl:   •  isosorbide mononitrate (IMDUR) 60 MG 24 hr tablet, Take 1.5 tablets by mouth Every Morning. (Patient taking differently: Take 60 mg by mouth Every Morning.), Disp: 45 tablet, Rfl: 11  •  montelukast (SINGULAIR) 10 MG tablet, Take 10 mg by mouth Daily., Disp: , Rfl:   •  nitroglycerin (NITROSTAT) 0.4 MG SL tablet, Place 1 tablet under the tongue Every 5 (Five) Minutes As Needed for Chest Pain. Take no more than 3 doses in 15 minutes., Disp: 30 tablet, Rfl: 6  •  O2 (OXYGEN), Inhale 2 L/min Daily., Disp: , Rfl:   •  omeprazole (PriLOSEC) 20 MG capsule, Take 20 mg by mouth Every Morning., Disp: , Rfl:   •  predniSONE (DELTASONE) 10 MG tablet, Take 10 mg by mouth Daily., Disp: , Rfl:   •  Probiotic Product (Niche) capsule, Take 1 capsule by mouth Every Morning., Disp: , Rfl:   •  tiotropium bromide-olodaterol (Stiolto Respimat) 2.5-2.5 MCG/ACT aerosol solution inhaler, Inhale 1 puff Daily., Disp: , Rfl:   •  traMADol (ULTRAM) 50 MG tablet, Take 50 mg by mouth 3 (Three) Times a Day. TAKES 2 50MG TABLETS 3 TIMES DAILY, Disp: , Rfl:   •  propranolol LA (INDERAL LA) 120 MG 24 hr capsule, Take 1 capsule by mouth 2 (Two) Times a Day., Disp: 60 capsule, Rfl: 11    Allergies   Allergen Reactions   • Epinephrine Other (See Comments)     \"SHAKES ALL OVER\"   • Pacerone [Amiodarone] Hallucinations   • Adhesive Tape    • Celebrex [Celecoxib] GI Intolerance     \"GI PAIN\"   • Keflex [Cephalexin] Other (See Comments)     \"UNKNOWN\"   • Niaspan [Niacin Er] Other (See Comments)     \"UNKNOWN\" " "       reports that she quit smoking about 33 years ago. Her smoking use included cigarettes. She started smoking about 71 years ago. She has a 15.00 pack-year smoking history. She has never used smokeless tobacco.    Past family medical history: No immediate family history of premature coronary artery disease.      Objective:   Physical exam:  /85 (BP Location: Left arm, Patient Position: Sitting)   Pulse 69   Ht 165.1 cm (65\")   Wt 79.4 kg (175 lb)   LMP  (LMP Unknown)   SpO2 94%   BMI 29.12 kg/m²   CONSTITUTIONAL: No acute distress    Labs:    BUN   Date Value Ref Range Status   06/09/2020 17 8 - 23 mg/dL Final     Creatinine   Date Value Ref Range Status   06/09/2020 1.26 (H) 0.57 - 1.00 mg/dL Final     Potassium   Date Value Ref Range Status   06/09/2020 3.6 3.5 - 5.2 mmol/L Final     ALT (SGPT)   Date Value Ref Range Status   06/09/2020 14 1 - 33 U/L Final     AST (SGOT)   Date Value Ref Range Status   06/09/2020 18 1 - 32 U/L Final       Lab Results   Component Value Date    CHOL 181 04/26/2017     Lab Results   Component Value Date    TRIG 224 (H) 04/26/2017     Lab Results   Component Value Date    HDL 46 04/26/2017     Lab Results   Component Value Date    LDL 95 04/26/2017     No components found for: LDLDIRECTC    5/22/2020 PCP labs: , BUN 20, Cr 1.37, Sodium 140, Potassium 4.0    9/2020 [facility] labs: Potassium 3.9, creatinine 1.5, hemoglobin 14.6, platelets 244    Diagnostic Data:    Procedures    EKG, [facility], 9/19/2020: A. fib with PVCs versus aberrancy, heart rate 92, QRS 80, QTc 521    DEVICE INTERROGATION:  [facility], Interrogation date 12/21/2020-   26% mode switching.     Echo, [facility], 9/2020: EF 55 to 60%, grade 1 diastolic dysfunction, aortic valve sclerosis without stenosis, mild mitral annular calcification, normal left atrial size, normal RV and RA size/function, mild tricuspid regurgitation with a peak regurgitation velocity of 2.8 " m/s    Echocardiogram 8/22/19: EF 60-65%, no significant valvular abnormalities    [facility] 4/2017  · Severe CAD involving the mid LAD and mid circumflex.  · There was a discrete, hazy 60% mid LAD stenosis that was found to be functional significant with an iFR 0.85.  The stenosis is now status post intervention with a Xience Alpine 3.0x18 mm drug-eluting stent  · There was a 70% mid circumflex stenosis that is now status post intervention with a Xience Alpine 2.5 x 23 mm drug-eluting stent    PFTs 2/2020 - Dr Null, pulmonology, moderate reduction in FVC suggestive of restrictive defect, but FEV1/FVC was normal, flow volume loop compatible with restrictive defect    Carotid duplex ultrasound 12/2019, outside facility: Less than 20% proximal ICA stenosis bilaterally, antegrade flow in the vertebral arteries bilaterally    Assessment and Plan:   Chary was seen today for hypertension and atrial fibrillation.    Diagnoses and all orders for this visit:      Essential hypertension  CKD  -Blood pressure is better controlled over the last 2 weeks.  Still with some high blood pressures but without hypotension    -Changing short acting propranolol to long-acting propranolol 120 mg twice daily to see if that helps with better consistency in her blood pressure  -Discussed increasing hydralazine to 3 times daily, but the patient does not have someone available midday to help her with that third dose.    Prior medicine side effects:  -Amlodipine: swelling but had a good BP response   -Clonidine: Did not tolerate, side effects not clear  -Cardura: Urinary retention   -Losartan: Occasional JOSIE    Sick sinus syndrome (CMS/HCC)  Atrial fibrillation  -Status post pacemaker, followed by EP  -21% AMS on 11/2020 interrogation.  26% AMS on 12/21/2020 interrogation  -Continue Tikosyn, changing propranolol to long-acting formulation  -Discussed increasing Tikosyn again, but it would warrant a hospitalization, which the patient is currently  trying to avoid during the Covid pandemic.  We will continue to consider this option.    -We will try to obtain the repeat remote monitor interrogation from earlier this morning and review.  If with significant changes, will update the family/patient    -Repeat EKG with the recent initiation of Stiolto to monitor QTC    Dyspnea  Lower extremity edema  -Stable, intermittent.   -Continue Lasix daily PRN    Coronary artery disease involving native coronary artery of native heart without angina pectoris  -Currently without chest pain.  -Continue aspirin, statin, beta-blocker, Imdur.  -Didn't tolerate Ranexa     Peripheral vascular disease (CMS/HCC)  -Decreased pulses on the right pedal vessels in the past.  -Possible claudication equivalent, but currently stable  -Continue to monitor clinically    Carotid stenosis, asymptomatic, bilateral  -Mild as of 12/2019, continue to monitor clinically.    Restrictive lung disease  Possible sleep apnea  -On home O2, follows up with pulmonology in [place], [place], with Dr. Null  -Seen by sleep medicine, holding off on sleep study during Covid pandemic    - Return in about 4 weeks (around 1/18/2021) for Telemedicine visit.    This patient has consented to a telehealth visit via telephone. The visit was scheduled as a telephone visit (patient did not have reliable access to a video platform) to comply with patient safety concerns in accordance with CDC recommendations.  All vitals recorded within this visit are reported by the patient.  I spent  25 minutes total on patient care including 13 minutes spent in direct conversation with this patient.       Jesus Araujo MD  12/21/20 12:09 EST

## 2020-12-22 NOTE — TELEPHONE ENCOUNTER
Patients daughter notified of EKG recommendations per JOLYNN Millard. No new recommendations. Patient to continue current plan of care.

## 2021-01-01 ENCOUNTER — DOCUMENTATION (OUTPATIENT)
Dept: PULMONOLOGY | Facility: CLINIC | Age: 86
End: 2021-01-01

## 2021-01-01 ENCOUNTER — OFFICE VISIT (OUTPATIENT)
Dept: CARDIOLOGY | Facility: CLINIC | Age: 86
End: 2021-01-01

## 2021-01-01 ENCOUNTER — IMMUNIZATION (OUTPATIENT)
Dept: VACCINE CLINIC | Facility: HOSPITAL | Age: 86
End: 2021-01-01

## 2021-01-01 ENCOUNTER — TELEPHONE (OUTPATIENT)
Dept: PULMONOLOGY | Facility: CLINIC | Age: 86
End: 2021-01-01

## 2021-01-01 ENCOUNTER — TELEPHONE (OUTPATIENT)
Dept: CARDIOLOGY | Facility: HOSPITAL | Age: 86
End: 2021-01-01

## 2021-01-01 ENCOUNTER — TELEPHONE (OUTPATIENT)
Dept: CARDIOLOGY | Facility: CLINIC | Age: 86
End: 2021-01-01

## 2021-01-01 ENCOUNTER — OFFICE VISIT (OUTPATIENT)
Dept: PULMONOLOGY | Facility: CLINIC | Age: 86
End: 2021-01-01

## 2021-01-01 ENCOUNTER — TRANSCRIBE ORDERS (OUTPATIENT)
Dept: GENERAL RADIOLOGY | Facility: HOSPITAL | Age: 86
End: 2021-01-01

## 2021-01-01 ENCOUNTER — DOCUMENTATION (OUTPATIENT)
Dept: CARDIOLOGY | Facility: CLINIC | Age: 86
End: 2021-01-01

## 2021-01-01 ENCOUNTER — OFFICE VISIT (OUTPATIENT)
Dept: CARDIOLOGY | Facility: HOSPITAL | Age: 86
End: 2021-01-01

## 2021-01-01 ENCOUNTER — HOSPITAL ENCOUNTER (OUTPATIENT)
Dept: GENERAL RADIOLOGY | Facility: HOSPITAL | Age: 86
Discharge: HOME | End: 2021-03-31
Admitting: NURSE PRACTITIONER

## 2021-01-01 VITALS
BODY MASS INDEX: 30.04 KG/M2 | HEIGHT: 64 IN | HEART RATE: 80 BPM | SYSTOLIC BLOOD PRESSURE: 122 MMHG | OXYGEN SATURATION: 97 % | TEMPERATURE: 97.1 F | DIASTOLIC BLOOD PRESSURE: 80 MMHG

## 2021-01-01 VITALS
SYSTOLIC BLOOD PRESSURE: 130 MMHG | HEART RATE: 60 BPM | HEIGHT: 64 IN | WEIGHT: 175 LBS | BODY MASS INDEX: 29.88 KG/M2 | OXYGEN SATURATION: 92 % | DIASTOLIC BLOOD PRESSURE: 82 MMHG | TEMPERATURE: 97.5 F

## 2021-01-01 VITALS
DIASTOLIC BLOOD PRESSURE: 73 MMHG | BODY MASS INDEX: 29.16 KG/M2 | HEIGHT: 65 IN | SYSTOLIC BLOOD PRESSURE: 139 MMHG | HEART RATE: 79 BPM | WEIGHT: 175 LBS

## 2021-01-01 VITALS
HEART RATE: 81 BPM | DIASTOLIC BLOOD PRESSURE: 78 MMHG | HEIGHT: 64 IN | OXYGEN SATURATION: 90 % | BODY MASS INDEX: 30.02 KG/M2 | SYSTOLIC BLOOD PRESSURE: 122 MMHG

## 2021-01-01 VITALS
RESPIRATION RATE: 23 BRPM | DIASTOLIC BLOOD PRESSURE: 84 MMHG | TEMPERATURE: 98 F | HEART RATE: 96 BPM | WEIGHT: 173.38 LBS | BODY MASS INDEX: 28.89 KG/M2 | SYSTOLIC BLOOD PRESSURE: 148 MMHG | OXYGEN SATURATION: 95 % | HEIGHT: 65 IN

## 2021-01-01 VITALS
HEIGHT: 65 IN | OXYGEN SATURATION: 92 % | HEART RATE: 86 BPM | BODY MASS INDEX: 29.16 KG/M2 | SYSTOLIC BLOOD PRESSURE: 178 MMHG | DIASTOLIC BLOOD PRESSURE: 98 MMHG | WEIGHT: 175 LBS

## 2021-01-01 DIAGNOSIS — I48.0 PAROXYSMAL ATRIAL FIBRILLATION (HCC): ICD-10-CM

## 2021-01-01 DIAGNOSIS — J84.10 PULMONARY FIBROSIS (HCC): ICD-10-CM

## 2021-01-01 DIAGNOSIS — I10 ESSENTIAL HYPERTENSION: ICD-10-CM

## 2021-01-01 DIAGNOSIS — I48.0 PAROXYSMAL ATRIAL FIBRILLATION (HCC): Primary | ICD-10-CM

## 2021-01-01 DIAGNOSIS — Z99.81 HYPOXEMIA REQUIRING SUPPLEMENTAL OXYGEN: ICD-10-CM

## 2021-01-01 DIAGNOSIS — E87.70 HYPERVOLEMIA, UNSPECIFIED HYPERVOLEMIA TYPE: ICD-10-CM

## 2021-01-01 DIAGNOSIS — J84.10 PULMONARY FIBROSIS (HCC): Primary | ICD-10-CM

## 2021-01-01 DIAGNOSIS — Z87.09 H/O PULMONARY FIBROSIS: ICD-10-CM

## 2021-01-01 DIAGNOSIS — R06.00 DYSPNEA, UNSPECIFIED TYPE: ICD-10-CM

## 2021-01-01 DIAGNOSIS — R06.00 DYSPNEA, UNSPECIFIED TYPE: Primary | ICD-10-CM

## 2021-01-01 DIAGNOSIS — I25.118 CORONARY ARTERY DISEASE OF NATIVE ARTERY OF NATIVE HEART WITH STABLE ANGINA PECTORIS (HCC): ICD-10-CM

## 2021-01-01 DIAGNOSIS — I65.23 CAROTID STENOSIS, ASYMPTOMATIC, BILATERAL: ICD-10-CM

## 2021-01-01 DIAGNOSIS — E87.70 HYPERVOLEMIA, UNSPECIFIED HYPERVOLEMIA TYPE: Primary | ICD-10-CM

## 2021-01-01 DIAGNOSIS — I73.9 PERIPHERAL VASCULAR DISEASE (HCC): ICD-10-CM

## 2021-01-01 DIAGNOSIS — I26.99 ACUTE PULMONARY EMBOLISM WITHOUT ACUTE COR PULMONALE, UNSPECIFIED PULMONARY EMBOLISM TYPE (HCC): ICD-10-CM

## 2021-01-01 DIAGNOSIS — R09.02 HYPOXEMIA REQUIRING SUPPLEMENTAL OXYGEN: ICD-10-CM

## 2021-01-01 DIAGNOSIS — Z00.6 EXAMINATION FOR NORMAL COMPARISON OR CONTROL IN CLINICAL RESEARCH: Primary | ICD-10-CM

## 2021-01-01 DIAGNOSIS — I25.118 CORONARY ARTERY DISEASE OF NATIVE ARTERY OF NATIVE HEART WITH STABLE ANGINA PECTORIS (HCC): Primary | ICD-10-CM

## 2021-01-01 DIAGNOSIS — I49.5 SICK SINUS SYNDROME (HCC): ICD-10-CM

## 2021-01-01 DIAGNOSIS — J44.1 COPD WITH ACUTE EXACERBATION (HCC): Primary | ICD-10-CM

## 2021-01-01 DIAGNOSIS — R06.09 DYSPNEA ON EXERTION: Primary | ICD-10-CM

## 2021-01-01 DIAGNOSIS — K21.9 GASTROESOPHAGEAL REFLUX DISEASE, UNSPECIFIED WHETHER ESOPHAGITIS PRESENT: ICD-10-CM

## 2021-01-01 DIAGNOSIS — J44.9 CHRONIC OBSTRUCTIVE PULMONARY DISEASE, UNSPECIFIED COPD TYPE (HCC): Primary | ICD-10-CM

## 2021-01-01 LAB
ANION GAP SERPL CALCULATED.3IONS-SCNC: 10.2 MMOL/L (ref 5–15)
BUN SERPL-MCNC: 15 MG/DL (ref 8–23)
BUN/CREAT SERPL: 15.2 (ref 7–25)
CALCIUM SPEC-SCNC: 8.8 MG/DL (ref 8.6–10.5)
CHLORIDE SERPL-SCNC: 105 MMOL/L (ref 98–107)
CO2 SERPL-SCNC: 21.8 MMOL/L (ref 22–29)
CREAT SERPL-MCNC: 0.99 MG/DL (ref 0.57–1)
GFR SERPL CREATININE-BSD FRML MDRD: 53 ML/MIN/1.73
GLUCOSE SERPL-MCNC: 91 MG/DL (ref 65–99)
POTASSIUM SERPL-SCNC: 3.8 MMOL/L (ref 3.5–5.2)
SODIUM SERPL-SCNC: 137 MMOL/L (ref 136–145)

## 2021-01-01 PROCEDURE — 99214 OFFICE O/P EST MOD 30 MIN: CPT | Performed by: INTERNAL MEDICINE

## 2021-01-01 PROCEDURE — 93280 PM DEVICE PROGR EVAL DUAL: CPT | Performed by: INTERNAL MEDICINE

## 2021-01-01 PROCEDURE — 93296 REM INTERROG EVL PM/IDS: CPT | Performed by: INTERNAL MEDICINE

## 2021-01-01 PROCEDURE — 71046 X-RAY EXAM CHEST 2 VIEWS: CPT

## 2021-01-01 PROCEDURE — 0001A: CPT | Performed by: INTERNAL MEDICINE

## 2021-01-01 PROCEDURE — 91300 HC SARSCOV02 VAC 30MCG/0.3ML IM: CPT | Performed by: INTERNAL MEDICINE

## 2021-01-01 PROCEDURE — 93294 REM INTERROG EVL PM/LDLS PM: CPT | Performed by: INTERNAL MEDICINE

## 2021-01-01 PROCEDURE — 99214 OFFICE O/P EST MOD 30 MIN: CPT | Performed by: NURSE PRACTITIONER

## 2021-01-01 PROCEDURE — 80048 BASIC METABOLIC PNL TOTAL CA: CPT | Performed by: NURSE PRACTITIONER

## 2021-01-01 PROCEDURE — 99213 OFFICE O/P EST LOW 20 MIN: CPT | Performed by: NURSE PRACTITIONER

## 2021-01-01 PROCEDURE — 99442 PR PHYS/QHP TELEPHONE EVALUATION 11-20 MIN: CPT | Performed by: INTERNAL MEDICINE

## 2021-01-01 PROCEDURE — 0002A: CPT | Performed by: INTERNAL MEDICINE

## 2021-01-01 RX ORDER — FUROSEMIDE 20 MG/1
TABLET ORAL
Qty: 12 TABLET | Refills: 3 | Status: SHIPPED | OUTPATIENT
Start: 2021-01-01 | End: 2021-01-01 | Stop reason: SDUPTHER

## 2021-01-01 RX ORDER — FUROSEMIDE 20 MG/1
TABLET ORAL
Qty: 45 TABLET | Refills: 2 | Status: SHIPPED | OUTPATIENT
Start: 2021-01-01 | End: 2021-01-01 | Stop reason: SDUPTHER

## 2021-01-01 RX ORDER — ISOSORBIDE MONONITRATE 60 MG/1
60 TABLET, EXTENDED RELEASE ORAL DAILY
Qty: 90 TABLET | Refills: 0 | Status: SHIPPED | OUTPATIENT
Start: 2021-01-01 | End: 2021-01-01

## 2021-01-01 RX ORDER — METOLAZONE 2.5 MG/1
2.5 TABLET ORAL TAKE AS DIRECTED
Qty: 10 TABLET | Refills: 0 | Status: SHIPPED | OUTPATIENT
Start: 2021-01-01 | End: 2021-01-01 | Stop reason: SDUPTHER

## 2021-01-01 RX ORDER — NYSTATIN 100000 [USP'U]/G
1 POWDER TOPICAL 3 TIMES DAILY
COMMUNITY

## 2021-01-01 RX ORDER — BUMETANIDE 1 MG/1
1 TABLET ORAL TAKE AS DIRECTED
Qty: 3 TABLET | Refills: 0 | Status: SHIPPED | OUTPATIENT
Start: 2021-01-01 | End: 2021-01-01 | Stop reason: SDUPTHER

## 2021-01-01 RX ORDER — FLUTICASONE PROPIONATE 50 MCG
2 SPRAY, SUSPENSION (ML) NASAL DAILY
COMMUNITY

## 2021-01-01 RX ORDER — FUROSEMIDE 20 MG/1
TABLET ORAL
Qty: 28 TABLET | Refills: 11 | OUTPATIENT
Start: 2021-01-01

## 2021-01-01 RX ORDER — DOFETILIDE 0.12 MG/1
CAPSULE ORAL
Qty: 56 CAPSULE | Refills: 11 | Status: SHIPPED | OUTPATIENT
Start: 2021-01-01

## 2021-01-01 RX ORDER — DOFETILIDE 0.12 MG/1
CAPSULE ORAL
Qty: 56 CAPSULE | Refills: 11 | Status: SHIPPED | OUTPATIENT
Start: 2021-01-01 | End: 2021-01-01

## 2021-01-01 RX ORDER — PROPRANOLOL HYDROCHLORIDE 120 MG/1
120 CAPSULE, EXTENDED RELEASE ORAL 2 TIMES DAILY
Qty: 90 CAPSULE | Refills: 3 | Status: SHIPPED | OUTPATIENT
Start: 2021-01-01 | End: 2021-01-01 | Stop reason: SDUPTHER

## 2021-01-01 RX ORDER — METOLAZONE 2.5 MG/1
2.5 TABLET ORAL TAKE AS DIRECTED
Qty: 10 TABLET | Refills: 0 | Status: SHIPPED | OUTPATIENT
Start: 2021-01-01 | End: 2021-01-01

## 2021-01-01 RX ORDER — FUROSEMIDE 20 MG/1
20 TABLET ORAL DAILY
Qty: 90 TABLET | Refills: 3
Start: 2021-01-01

## 2021-01-01 RX ORDER — CETIRIZINE HYDROCHLORIDE 10 MG/1
10 TABLET ORAL DAILY
COMMUNITY

## 2021-01-01 RX ORDER — ASPIRIN 81 MG
TABLET,CHEWABLE ORAL
Qty: 30 TABLET | Refills: 11 | Status: SHIPPED | OUTPATIENT
Start: 2021-01-01

## 2021-01-01 RX ORDER — FUROSEMIDE 20 MG/1
TABLET ORAL
Qty: 12 TABLET | Refills: 3
Start: 2021-01-01 | End: 2021-01-01 | Stop reason: SDUPTHER

## 2021-01-01 RX ORDER — ISOSORBIDE MONONITRATE 60 MG/1
90 TABLET, EXTENDED RELEASE ORAL DAILY
Qty: 45 TABLET | Refills: 6 | Status: SHIPPED | OUTPATIENT
Start: 2021-01-01 | End: 2021-01-01 | Stop reason: SDUPTHER

## 2021-01-01 RX ORDER — POTASSIUM CHLORIDE 20 MEQ/1
TABLET, EXTENDED RELEASE ORAL
Qty: 56 TABLET | Refills: 10 | Status: SHIPPED | OUTPATIENT
Start: 2021-01-01

## 2021-01-01 RX ORDER — PROPRANOLOL HCL 60 MG
60 CAPSULE, EXTENDED RELEASE 24HR ORAL TAKE AS DIRECTED
Qty: 90 CAPSULE | Refills: 11 | Status: SHIPPED | OUTPATIENT
Start: 2021-01-01

## 2021-01-01 RX ORDER — AMOXICILLIN 875 MG/1
875 TABLET, COATED ORAL 2 TIMES DAILY
COMMUNITY
End: 2021-01-01

## 2021-01-01 RX ORDER — APIXABAN 5 MG/1
TABLET, FILM COATED ORAL
Qty: 4 TABLET | Refills: 11 | Status: SHIPPED | OUTPATIENT
Start: 2021-01-01

## 2021-01-01 RX ORDER — CLONIDINE HYDROCHLORIDE 0.1 MG/1
TABLET ORAL DAILY PRN
COMMUNITY

## 2021-01-01 RX ORDER — NITROGLYCERIN 400 UG/1
SPRAY ORAL
COMMUNITY
Start: 2021-01-01

## 2021-01-01 RX ORDER — ONDANSETRON HYDROCHLORIDE 8 MG/1
8 TABLET, FILM COATED ORAL EVERY 8 HOURS PRN
COMMUNITY

## 2021-01-01 RX ORDER — BUMETANIDE 1 MG/1
1 TABLET ORAL TAKE AS DIRECTED
Qty: 15 TABLET | Refills: 2 | Status: SHIPPED | OUTPATIENT
Start: 2021-01-01 | End: 2021-01-01

## 2021-01-01 RX ORDER — APIXABAN 5 MG/1
TABLET, FILM COATED ORAL
Qty: 60 TABLET | Refills: 0 | Status: SHIPPED | OUTPATIENT
Start: 2021-01-01 | End: 2021-01-01

## 2021-01-01 RX ORDER — ISOSORBIDE MONONITRATE 60 MG/1
TABLET, EXTENDED RELEASE ORAL
Qty: 30 TABLET | Refills: 11 | Status: SHIPPED | OUTPATIENT
Start: 2021-01-01

## 2021-01-04 NOTE — TELEPHONE ENCOUNTER
Patient started having increased SOA and edema on 12/23. Dr. Tobias treated pt with increased doses of torsemide (20 mg) and lasix (20 mg). Patient had no improvement by 12/25 so family increased Lasix dose (40 mg daily) for 12/25, 12/26, 12/27. Patient still had no improvement and visited Dr. Tobias for f/u chest xray and labs. Patient was instructed to call our office for further instructions.       Patient is currently complaining of shortness of breath without exertion, increased AFIB episodes, and lower extremity edema. Patient's daughter states that she is improved from the week of 12/28, however, still not back to her baseline. Daughter expresses that the patient has declined within the past month. Jessica believes that the hydralazine may be causing her issues with breathing and swelling as well.     Patient has been taking Torsemide 10 mg every other day. She has not been on Lasix since 12/27.    I have requested xray and labs from Dr. Tobias's office. Please advise.

## 2021-01-06 NOTE — TELEPHONE ENCOUNTER
Leann Silva, looked at her last interrogation had HVR but the AFib recordings appear to have a controlled ventricular rate. Any additional thoughts on her? Thanks!

## 2021-01-06 NOTE — TELEPHONE ENCOUNTER
Jessica, patients daughter, called back. Patients daughters are requesting the patient be switched from Hydralazine back to Losartan. They state that the strongly feel all of these issues (along with the increase in Afib) are being caused by the medication and not Afib. They do not wish to consider adjusting Tikosyn dosing without first seeing if the hydralazine is causing her issues.     Please advise. The patients daughter did not seem receptive to any other possible plan of care BUT to take patient off hydralazine. She is aware you are waiting to hear back from [facility].

## 2021-01-06 NOTE — TELEPHONE ENCOUNTER
Contacted patients daughter, Jessica, with recommendations per MJS. Patient to take Metolazone as directed.

## 2021-01-07 NOTE — TELEPHONE ENCOUNTER
Patients daughter, Jessica, contacted with recommendations. Daughter had additional concerns regarding patients hydralazine and it worsening her AFIB. Patients daughters still wish to have her on something other than this medication.     Patients daughter advised these issues will be addressed with Dr. Araujo and office will return call.

## 2021-01-07 NOTE — TELEPHONE ENCOUNTER
----- Message from Jesus Araujo MD sent at 1/7/2021  3:46 PM EST -----  I would not recommend the use losartan due to the patient's intermittent JOSIE with a creatinine that has gone up and down throughout the last 6 months.  Also possibly worsened by the atrial fibrillation.

## 2021-01-08 NOTE — PROGRESS NOTES
Called the patient's daughter, Jessica. Discussed clinically situation. Still has some swelling, occasionally shortness of air/mild congestion on CXR.    Patient's family continue to be concerned that this may be due to the initiation of hydralazine.  I personally believe that this is less likely the cause.  She likely has ongoing diastolic heart failure in the setting of increased atrial fibrillation.    -Recommend metolazone as previously ordered.  I will have my staff call [employer] to make sure the prescription went through.  Would not treat with maintenance dosing due to intermittent JOSIE on CKD  -Holding hydralazine for the next couple weeks as a trial. Patient's family concerned it may be increasing her AFib. I find it unlikely, but will hold off on it as a trial. Monitoring blood pressure.  If blood pressure still elevated, will try low-dose nifedipine.  Discussed that the patient had swelling with amlodipine, but since that she has multiple drug intolerances, there are few options left.  -We will set up a 2 week telemedicine visit.    Jesus Araujo MD, MSc, FACC, [facility]  Interventional Cardiology  [facility]

## 2021-01-08 NOTE — TELEPHONE ENCOUNTER
Per pharmacist, metolazone is contraindicated with Tikosyn as outpatient therapy, and cannot be dispensed. Discussed with MJS. Patient to take Bumex 1 mg daily for 3 days. Patients daughter instructed not to give Lasix when taking Bumex. All questions answered at this time, pt daughter agreeable to plan.

## 2021-01-13 NOTE — TELEPHONE ENCOUNTER
"Patients daughter called to report that patient is still having trouble with edema and shortness of breath. Zelda reports that she has not given her the Bumex that was prescribed due to her not wanting to \"overdue it with diuretics\". She did state that over the weekend she felt her mother was dehydrated so she forced fluids and now she has worsening edema.       Patients daughter advised to take Bumex for a few days as prescribed (instructed to not give with Lasix) and call office on Friday for follow up on symptoms. Patient daughter agreeable to plan.     Of note- will aim to put patient on schedule for telephone visit on 01/20/21.     "

## 2021-01-18 NOTE — PROGRESS NOTES
[facility] AT [address], [address]  [place], [place], [address] [phone]  WWW.[facility] ImageSaint [facility]           OUTPATIENT CLINIC FOLLOW UP NOTE    Patient Care Team:  Patient Care Team:  Saige Tobias MD as PCP - General (Internal Medicine)  Dex Kelly MD as Consulting Physician (Cardiology)  Ricardo Cross MD as Consulting Physician (Cardiology)    Subjective:      Chief Complaint   Patient presents with   • Coronary Artery Disease       HPI:    Chary Boucher is a 87 y.o. female.  Cardiac problem list:  1. Coronary artery disease:  a. Left heart catheterization 2002, multi-vessel coronary disease/medical therapy.  b. Stress Cardiolite 2004, no ischemia.  c. Chest pain/left heart catheterization, December 2008, Dr. Mendoza revealing significant stenosis treated with drug eluting stent. Normal LV function/”spasm” right coronary artery (catheter induced).  d. Stress Cardiolite, 07/27/2010: Mild anterior ischemia with questionable artifact. Normal LV size and function.   e. Lexiscan Cardiolite, 03/25/2015 showing left ventricular ejection fraction 75%, no ischemia.   f. [facility] Dr Araujo with PTCA/stenting of LAD and LCFX with ESTEPHANIA 4/2017  2. Peripheral vascular disease:  a. Severe right common iliac stenosis.  b. Status post successful PTCA and placement of stent, December 2008.  c. Abdominal aortic aneurysm  3. Carotid stenosis: Status-post PTCA and stenting of the right ICA, 09/25/2008.   4. Atrial fibrillation:  a. Tikosyn therapy 10/25/2007.  b. Medtronic dual chamber pacemaker.  c. Generator change ([employer] [facility]) 10/10/17  5. Essential Hypertension.  6. Dyslipidemia.  7. Degenerative joint disease/arthritis.  8. GERD.  9. Osteoporosis.  10. Possible abdominal aortic aneurysm:  a. Ultrasound of the abdomen on 03/18/2015 with size measured at 2.3 centimeters.  11. COPD/Pulmonary Fibrosis  12. Tremor, on propranolol    The patient presents today for follow-up by  telephone    The patient's daughters were in on her phone call today.    Trial of being off hydralazine the last couple weeks - four mornings over the last two weeks with BP 150s/90s. Mid-day 120-130s. Bedtime 160s. Acceptable for her.    Took Bumex on a Thurs/Fri (Jan 9 and 10). Ankle and feet reportedly improved significantly. No severe swelling.     Some tough days. As an example, no Afib episodes yesterday. Felt better. Today, while in Afib she has been more lethargic, more trouble breathing, less appetite.     Review of Systems:  Positive for dyspnea, fatigue, mild lower extremity swelling  Negative for exertional chest pain, orthopnea, PND,  palpitations, lightheadedness, syncope.     PFSH:  Patient Active Problem List   Diagnosis   • Coronary artery disease of native artery of native heart with stable angina pectoris (CMS/HCC)   • Peripheral vascular disease (CMS/[facility])   • Paroxysmal atrial fibrillation (CMS/[facility])   • Essential hypertension   • Dyslipidemia   • GERD (gastroesophageal reflux disease)   • Osteoporosis   • Abdominal aortic aneurysm (CMS/[facility])   • Bradycardia   • Hypertensive heart disease   • TIA (transient ischemic attack)   • CVA (cerebral vascular accident) (CMS/[facility])   • Syncope   • COPD (chronic obstructive pulmonary disease) (CMS/[facility])   • Chest pain   • Sick sinus syndrome (CMS/[facility])   • Carotid stenosis, asymptomatic, bilateral   • Hypoxemia requiring supplemental oxygen   • ALCIDES (obstructive sleep apnea) - possible   • Excessive daytime sleepiness   • Snoring         Current Outpatient Medications:   •  apixaban (ELIQUIS) 2.5 MG tablet tablet, Take 2.5 mg by mouth 2 (Two) Times a Day., Disp: , Rfl:   •  aspirin 81 MG EC tablet, Take 81 mg by mouth Daily., Disp: , Rfl:   •  atorvastatin (LIPITOR) 40 MG tablet, Take 40 mg by mouth Every Night., Disp: , Rfl:   •  bumetanide (Bumex) 1 MG tablet, Take 1 tablet by mouth Take As Directed. Take 1 mg daily for 3 days. Do not take with Lasix., Disp: 15  tablet, Rfl: 2  •  busPIRone (BUSPAR) 5 MG tablet, Take 5 mg by mouth 2 (Two) Times a Day., Disp: , Rfl:   •  Calcium Carb-Cholecalciferol (CALCIUM 600 + D PO), Take 1 tablet by mouth Every Night., Disp: , Rfl:   •  calcium citrate-vitamin d (CITRACAL) 200-250 MG-UNIT tablet tablet, Take 1 tablet by mouth Daily., Disp: , Rfl:   •  dofetilide (TIKOSYN) 125 MCG capsule, TAKE 1 CAPSULE TWICE DAILY, Disp: 180 capsule, Rfl: 3  •  escitalopram (LEXAPRO) 10 MG tablet, Take 10 mg by mouth Every Morning., Disp: , Rfl:   •  furosemide (LASIX) 20 MG tablet, Take 1 tablet by mouth Daily As Needed (swelling.)., Disp: 30 tablet, Rfl: 2  •  ipratropium-albuterol (DUO-NEB) 0.5-2.5 mg/3 ml nebulizer, Take 3 mL by nebulization Daily., Disp: , Rfl:   •  isosorbide mononitrate (IMDUR) 60 MG 24 hr tablet, Take 1.5 tablets by mouth Every Morning. (Patient taking differently: Take 60 mg by mouth Every Morning.), Disp: 45 tablet, Rfl: 11  •  montelukast (SINGULAIR) 10 MG tablet, Take 10 mg by mouth Daily., Disp: , Rfl:   •  nitroglycerin (NITROSTAT) 0.4 MG SL tablet, Place 1 tablet under the tongue Every 5 (Five) Minutes As Needed for Chest Pain. Take no more than 3 doses in 15 minutes., Disp: 30 tablet, Rfl: 6  •  O2 (OXYGEN), Inhale 2 L/min Daily., Disp: , Rfl:   •  omeprazole (PriLOSEC) 20 MG capsule, Take 20 mg by mouth Every Morning., Disp: , Rfl:   •  predniSONE (DELTASONE) 10 MG tablet, Take 10 mg by mouth Daily., Disp: , Rfl:   •  Probiotic Product (Woo With Style) capsule, Take 1 capsule by mouth Every Morning., Disp: , Rfl:   •  propranolol LA (INDERAL LA) 120 MG 24 hr capsule, Take 1 capsule by mouth 2 (Two) Times a Day., Disp: 60 capsule, Rfl: 11  •  tiotropium bromide-olodaterol (Stiolto Respimat) 2.5-2.5 MCG/ACT aerosol solution inhaler, Inhale 1 puff Daily., Disp: , Rfl:   •  traMADol (ULTRAM) 50 MG tablet, Take 50 mg by mouth 3 (Three) Times a Day. TAKES 2 50MG TABLETS 3 TIMES DAILY, Disp: , Rfl:     Allergies  "  Allergen Reactions   • Epinephrine Other (See Comments)     \"SHAKES ALL OVER\"   • Pacerone [Amiodarone] Hallucinations   • Adhesive Tape    • Celebrex [Celecoxib] GI Intolerance     \"GI PAIN\"   • Keflex [Cephalexin] Other (See Comments)     \"UNKNOWN\"   • Niaspan [Niacin Er] Other (See Comments)     \"UNKNOWN\"        reports that she quit smoking about 34 years ago. Her smoking use included cigarettes. She started smoking about 71 years ago. She has a 15.00 pack-year smoking history. She has never used smokeless tobacco.    Past family medical history: No immediate family history of premature coronary artery disease.      Objective:   Physical exam:  /73 (BP Location: Right arm, Patient Position: Sitting)   Pulse 79   Ht 165.1 cm (65\")   Wt 79.4 kg (175 lb)   LMP  (LMP Unknown)   BMI 29.12 kg/m²   CONSTITUTIONAL: No acute distress    Labs:    BUN   Date Value Ref Range Status   06/09/2020 17 8 - 23 mg/dL Final     Creatinine   Date Value Ref Range Status   06/09/2020 1.26 (H) 0.57 - 1.00 mg/dL Final     Potassium   Date Value Ref Range Status   06/09/2020 3.6 3.5 - 5.2 mmol/L Final     ALT (SGPT)   Date Value Ref Range Status   06/09/2020 14 1 - 33 U/L Final     AST (SGOT)   Date Value Ref Range Status   06/09/2020 18 1 - 32 U/L Final       Lab Results   Component Value Date    CHOL 181 04/26/2017     Lab Results   Component Value Date    TRIG 224 (H) 04/26/2017     Lab Results   Component Value Date    HDL 46 04/26/2017     Lab Results   Component Value Date    LDL 95 04/26/2017     No components found for: LDLDIRECTC    5/22/2020 PCP labs: , BUN 20, Cr 1.37, Sodium 140, Potassium 4.0    9/2020 outlying facility labs: Potassium 3.9, creatinine 1.5, hemoglobin 14.6, platelets 244    1/2021 Cr 1.3    Diagnostic Data:    Procedures    EKG, outlying facility, 9/19/2020: A. fib with PVCs versus aberrancy, heart rate 92, QRS 80, QTc 521    DEVICE INTERROGATION:  [facility], Interrogation date 12/21/2020- "   26% mode switching.     [facility]: EF 55 to 60%, grade 1 diastolic dysfunction, aortic valve sclerosis without stenosis, mild mitral annular calcification, normal left atrial size, normal RV and RA size/function, mild tricuspid regurgitation with a peak regurgitation velocity of 2.8 m/s    Echocardiogram 8/22/19: EF 60-65%, no significant valvular abnormalities    [facility] 4/2017  · Severe CAD involving the mid LAD and mid circumflex.  · There was a discrete, hazy 60% mid LAD stenosis that was found to be functional significant with an iFR 0.85.  The stenosis is now status post intervention with a Xience Alpine 3.0x18 mm drug-eluting stent  · There was a 70% mid circumflex stenosis that is now status post intervention with a Xience Alpine 2.5 x 23 mm drug-eluting stent    PFTs 2/2020 - Dr Null, pulmonology, moderate reduction in FVC suggestive of restrictive defect, but FEV1/FVC was normal, flow volume loop compatible with restrictive defect    Carotid duplex ultrasound 12/2019, outside facility: Less than 20% proximal ICA stenosis bilaterally, antegrade flow in the vertebral arteries bilaterally    Assessment and Plan:   Chary was seen today for hypertension and atrial fibrillation.    Diagnoses and all orders for this visit:      Essential hypertension  CKD  -Blood pressure stable on her current regimen.  Continue current medications  -Formally discontinued hydralazine.  Did not seem to help significantly and her blood pressure has been stable off of it.    Prior medicine side effects:  -Amlodipine: swelling but had a good BP response   -Clonidine: Did not tolerate, side effects not clear  -Cardura: Urinary retention   -Losartan: Occasional JOSIE    Sick sinus syndrome (CMS/HCC)  Atrial fibrillation  -Status post pacemaker, followed by EP  -21% AMS on 11/2020 interrogation.  26% AMS on 12/21/2020 interrogation.  Reportedly today's remote check revealed 29% AMS  -Patient's daughters can tell  when she is in A. fib.  Heart rates have been higher, above 100 bpm.  Patient is reportedly lethargic and more short of air when in A. fib.    -Continue Tikosyn and propranolol  -Can consider increasing Tikosyn, but has CKD.  Could consider amiodarone but has advanced lung disease.  -We will discuss options with Dr. Cross again.  Could consider an AV shivani ablation; discussed this option with the patient and her daughters today.    Dyspnea  Lower extremity edema  -Currently stable, continue Bumex 1 mg as needed no more than once weekly for now    Coronary artery disease involving native coronary artery of native heart without angina pectoris  -Currently without chest pain.  -Continue aspirin, statin, beta-blocker, Imdur.  -Didn't tolerate Ranexa     Peripheral vascular disease (CMS/HCC)  -Decreased pulses on the right pedal vessels in the past.  -Possible claudication equivalent, but currently stable.  Patient not very active  -Monitoring clinically    Carotid stenosis, asymptomatic, bilateral  -Mild as of 12/2019, continue to monitor clinically.    Restrictive lung disease  Possible sleep apnea  -On home O2, follows up with pulmonology in [place], [place], with Dr. Null  -Seen by sleep medicine, holding off on sleep study during Covid pandemic    - Return for Follow-up to be determined after discussion with Dr. Cross.    This patient has consented to a telehealth visit via telephone. The visit was scheduled as a telephone visit (patient did not have reliable access to a video platform) to comply with patient safety concerns in accordance with CDC recommendations.  All vitals recorded within this visit are reported by the patient.  I spent 25 minutes total on patient care including 15 minutes spent in direct conversation with this patient.       Jesus Araujo MD  01/18/21 16:50 EST

## 2021-03-08 NOTE — TELEPHONE ENCOUNTER
Daughter called with concerns of Mrs Boucher not feeling well today and feeling lifeless.  Daughter wanted to send in a reading on her pacemaker.  Reading received and she is in afib. Afib burden is same as last several months.  Rates controlled.  She wanted to speak with Dr Araujo's nurse so transferred to Kathi.

## 2021-03-08 NOTE — TELEPHONE ENCOUNTER
"Patients daughter called to report that patient is feeling \"lifeless\" and short of breath today with low energy. Recent device download shows patient is in AFIB today. Patients daughter states that she has been recently treated with Bumex 1 mg daily for 5 days due to increase fluid in lungs, however, they chose to only give her Bumex for 3 days. Patient is still having lower extremity edema. Patients daughter did state that patient went several weeks without diuretic due to patient seeming \"depleted and disconnected\". Prior to family holding Bumex, patient was taking 1 mg weekly. Importance of medication adherence was discussed with daughter.       Recent cxray and labs from [facility] requested. Patient has follow up with Dr. Tobias tomorrow.   "

## 2021-03-12 NOTE — TELEPHONE ENCOUNTER
Patients daughter does not wish to continue Bumex. She believes that it is too strong for the patient and is making her disoriented and confused . She is wanting the patient to go off Bumex and back on Lasix several times a week. Patients daughter is also asking for a pulmonary referral for [facility].  Please advise.     Of note- the patient has not had any diuretic since last Sunday. Patient has edema in feet, ankles, and lower legs.

## 2021-03-13 NOTE — TELEPHONE ENCOUNTER
Would hold off Eliquis for now due to recent fall warranting stitches and recent falls. PCP also suggested this in note you obtained.

## 2021-03-15 NOTE — TELEPHONE ENCOUNTER
Patients daughter advised of recommendations per MJS. Patients daughter agreeable to plan. Will call [facility] clinic to set up appt for patient. Pulmonary referral sent.     Of note- patient advised to hold Eliquis until okayed by Dr. Fernandez to restart re: hematoma from recent fall.

## 2021-03-19 NOTE — PROGRESS NOTES
"Chief Complaint  Congestive Heart Failure and Establish Care    Subjective    History of Present Illness {CC  Problem List  Visit  Diagnosis   Encounters  Notes  Medications  Labs  Result Review Imaging  Media :23}       History of Present Illness   87-year-old female presents the office today at the request of Dr. Araujo for ongoing evaluation of her pedal edema, worsening dyspnea on exertion.  Patient's daughter is present in the room and relates most of patient history.  She reports she had been taking Bumex regularly but felt like her mother was dehydrating when taking Bumex.  She was recently transitioned back to Lasix 20 mg daily and her mother took Lasix 20 mg daily on Monday and Tuesday of this week with some improvement in her symptoms. Notes a recent fall and eliquis is currently on hold. Notes that bp earlier this am was 154/78.   Objective     Vital Signs:   Vitals:    03/19/21 1055 03/19/21 1059 03/19/21 1100 03/19/21 1145   BP: 180/87 177/86 (!) 186/96 148/84   BP Location: Right arm Left arm Left arm Left arm   Patient Position: Sitting Sitting Standing Sitting   Cuff Size: Adult Adult Adult    Pulse: 74 83 96    Resp:   23    Temp:   98 °F (36.7 °C)    TempSrc:   Temporal    SpO2: 97% 95% 95%    Weight:   78.6 kg (173 lb 6 oz)    Height:   165.1 cm (65\")      Body mass index is 28.85 kg/m².  Physical Exam  Vitals and nursing note reviewed.   Constitutional:       Appearance: Normal appearance.   HENT:      Head: Normocephalic.   Eyes:      Pupils: Pupils are equal, round, and reactive to light.   Cardiovascular:      Rate and Rhythm: Normal rate and regular rhythm.      Pulses: Normal pulses.      Heart sounds: Normal heart sounds. No murmur heard.     Pulmonary:      Effort: Pulmonary effort is normal.      Breath sounds: Rales (faint ) present.   Abdominal:      General: Bowel sounds are normal.      Palpations: Abdomen is soft.   Musculoskeletal:         General: Normal range of motion.    "   Cervical back: Normal range of motion.      Right lower leg: Edema (2+ pitting ) present.      Left lower leg: Edema (2+pitting ) present.   Skin:     General: Skin is warm and dry.      Capillary Refill: Capillary refill takes less than 2 seconds.   Neurological:      Mental Status: She is alert and oriented to person, place, and time.   Psychiatric:         Mood and Affect: Mood normal.         Thought Content: Thought content normal.              Result Review  Data Reviewed:{ Labs  Result Review  Imaging  Med Tab  Media :23}   Basic Metabolic Panel (03/19/2021 11:47)  SCANNED - LABS (01/04/2021)  SCANNED - LABS (12/29/2020)               Assessment and Plan {CC Problem List  Visit Diagnosis  ROS  Review (Popup)  Health Maintenance  Quality  BestPractice  Medications  SmartSets  SnapShot Encounters  Media :23}   1. Hypervolemia, unspecified hypervolemia type  Multiple attempts to achieve iv access were unsuccessful  Patient to begin lasix 40 mg today, lasix 20 mg on Saturday and 40 mg again po on Sunday  Patient to receive a follow up call from [facility] on Monday to reassess  - Basic Metabolic Panel; Future  - Basic Metabolic Panel    2. Essential hypertension  Usually well controlled but elevated today due to fluid overload  Continue propranolol   HTN Education provided today including signs and symptoms, medication management, daily blood pressure monitoring. Patient encouraged to call the [facility] center with any abnormal readings.   - Basic Metabolic Panel; Future  - Basic Metabolic Panel    3. Paroxysmal atrial fibrillation (CMS/HCC)  Rate controlled on propranolol  CHADS-VASc Risk Assessment            7 Total Score    1 Hypertension    2 Age >/= 75    2 PRIOR STROKE/TIA/THROMBO    1 Vascular Disease    1 Sex: Female        Criteria that do not apply:    CHF    DM    Age 65-74        eliquis on hold due to recent fall          Follow Up {Instructions Charge Capture  Follow-up  Communications :23}   Return if symptoms worsen or fail to improve.    Patient was given instructions and counseling regarding her condition or for health maintenance advice. Please see specific information pulled into the AVS if appropriate.  Patient was instructed to call the [facility] with any questions, concerns, or worsening symptoms.    *Please note that portions of this note were completed with a voice recognition program. Efforts were made to edit the dictations, but occasionally words are mistranscribed.

## 2021-03-19 NOTE — PATIENT INSTRUCTIONS
Take 40 mg of lasix today, 20 mg of lasix tomorrow and 40 mg again on Sunday  Irene will call you Monday morning

## 2021-03-23 NOTE — TELEPHONE ENCOUNTER
Called and spoke with patients daughter who stated there has been some improvement in the pedal edema but the patient does still have pitting edema present. Asked patients daughter if the patient is having any fluid draining from her lower extremities, patients daughter stated not at this time however the patients ankles are still very puffy and shiny. Patients daughter stated for the past 3 days she has noticed that her mothers pulse has been very irregular only in the evening jumping anywhere from 60bpm-108bpm. Patient has been having chest pain, and had to take two nitroglycerin a few days ago. Patient also had a BP reading of 201/106 last night before bed. Patient is still sleeping at this time so daughter has not checked to see if any improvements so far today.

## 2021-03-23 NOTE — TELEPHONE ENCOUNTER
----- Message from JOLYNN Garcia sent at 3/22/2021 12:08 PM EDT -----  Please call patient's daughter and see how she did over the weekend with alternating lasix 20/40. Ask about pedal edema and dypsnea. thanks  ----- Message -----  From: Irene Nam APRN  Sent: 3/22/2021  To: JOLYNN Garcia    Lasix 20/40

## 2021-03-24 NOTE — TELEPHONE ENCOUNTER
Pedal edema has improved. Patient's daughter noted that bp has improved. Notes that patient is voiding significant amounts of urine. Notes increased episodes of afib.   Per patient's daughter, patient's bp was 190 systolic and she was given 1/2 of clonidine 0.1 mg with appropriate reduction of bp.   Patient to alternate lasix 20 mg with every other day 40 mg for next 3 days, then begin lasix 20 mg three times  Weekly.

## 2021-03-29 NOTE — TELEPHONE ENCOUNTER
[facility] Telephone Note for:    Chary Boucher, 11/18/1933  Home Phone [phone]   Mobile [phone]   Mobile [phone]       Reason for Call:  Tachycardia, SOA    Symptoms:  Irregular HR, SOA     Onset:: 6-7 days     Anything Tried?:     Other Pertinent Information (Weight, Vitals, etc.):  Patients daughter called and stated her mothers HR has been very fast and irregular recently bouncing around from the 60's to the low 100's. Patients daughter stated her mother is also experiencing a lot of SOA, stating that it sounds very constricted like her mother is struggling for air, and then the HR will go back to normal and the breathing will as well. Patients daughter would like to discuss these concerns.  Patients daughter stated the Lasix 20/40/20 helped bring the swelling down in her mothers feet and ankles. Patients daughter is inquiring about getting an order for an  Chest Xray.       Ordered chest xray

## 2021-03-29 NOTE — TELEPHONE ENCOUNTER
>>Pt’s daughter called requesting to send in a remote transmission. States she & her sister are concerned about pt’s heart rate and rhythm because pt sometimes will breathe fast then slow. Pt evaluated by JOLYNN Spann 3/19/2021.    >>Assisted w/transmission: Normal device function; AF burden 28%, which is unchanged-recent very short episodes of AF w/longest recent episode 8 minutes, rates controlled.  Notified daughter of results. Reviewed medications, pt is compliant with medications.     >>Noted that patient is on home O2, receiving nebulizer treatments & using an inhaler. Inquired of pt's daughter who is managing pt’s COPD; pt’s daughter reports that pt has been referred to a pulmonologist but hasn’t seen one yet.     >>Pt has F/U appointment w/Dr. Cross 3/31/2021.

## 2021-03-31 NOTE — PROGRESS NOTES
Chary Boucher  11/18/1933  [phone]    03/31/2021    [facility] CARDIOLOGY     Referring Provider: No ref. provider found     Saige Tobias MD  [address] [place] [address]    Chief Complaint   Patient presents with   • Coronary Artery Disease       Problem List:     1. Coronary disease:  a. Left heart catheterization 2002, multi-vessel coronary disease/medical therapy.  b. Stress Cardiolite 2004, no ischemia.  c. Chest pain/left heart catheterization, December 2008, Dr. Mendoza revealing significant LV stenosis treated with drug eluting stent. Normal LV function/”spasm” right coronary artery (catheter induced).  d. Stress Cardiolite, 07/27/2010: Mild anterior ischemia with questionable artifact. Normal LV size and function.   e. Lexiscan Cardiolite, 03/25/2015 showing left ventricular ejection fraction 75%, no ischemia.   f. [facility] Dr Araujo with PTCA/stenting of LAD and LCFX with ESTEPHANIA 4/2017  g. Echocardiogram 8/22/19: EF 60-65%, no significant valvular abnormalities  2. Peripheral vascular disease:  a. Severe right common iliac stenosis.  b. Abdominal aortic aneurysm measuring 2 cm in March 2008.  c. Status post successful PTCA and placement of stent, December 2008.  d. Status-post PTCA and stenting of the right ICA, 09/25/2008.   3. Atrial fibrillation:  a. CHADsVasc= 4 on eliquis  b. Tikosyn therapy 10/25/2007.  c. Medtronic dual chamber pacemaker.  d. Generator change ([employer]) 10/10/17  4. Essential Hypertension.  5. Dyslipidemia.  6. Degenerative joint disease/arthritis.  7. GERD.  8. Osteoporosis.  9. Abdominal aortic aneurysm:  a. Ultrasound of the abdomen on 03/18/2015 with size measured at 2.3 centimeters.  10. COPD/Pulmonary Fibrosis       Allergies  Allergies   Allergen Reactions   • Azithromycin Hives   • Ciprofloxacin Anaphylaxis   • Levaquin [Levofloxacin] Anaphylaxis   • Septra [Sulfamethoxazole-Trimethoprim] Anaphylaxis   • Epinephrine Other (See  "Comments)     \"SHAKES ALL OVER\"   • Pacerone [Amiodarone] Hallucinations   • Adhesive Tape Rash   • Celebrex [Celecoxib] GI Intolerance     \"GI PAIN\"   • Keflex [Cephalexin] Other (See Comments)     \"UNKNOWN\"   • Niaspan [Niacin Er] Other (See Comments)     \"UNKNOWN\"       Current Medications    Current Outpatient Medications:   •  aspirin 81 MG EC tablet, Take 81 mg by mouth Daily., Disp: , Rfl:   •  atorvastatin (LIPITOR) 40 MG tablet, Take 40 mg by mouth Every Night., Disp: , Rfl:   •  busPIRone (BUSPAR) 5 MG tablet, Take 5 mg by mouth 2 (Two) Times a Day., Disp: , Rfl:   •  Calcium Carb-Cholecalciferol (CALCIUM 600 + D PO), Take 1 tablet by mouth Every Night., Disp: , Rfl:   •  Calcium Polycarbophil (FIBER-CAPS PO), Take 1 capsule by mouth Daily., Disp: , Rfl:   •  cetirizine (zyrTEC) 10 MG tablet, Take 10 mg by mouth Daily., Disp: , Rfl:   •  cloNIDine (CATAPRES) 0.1 MG tablet, Take  by mouth Daily As Needed for High Blood Pressure. Takes 0.5 daily as needed , Disp: , Rfl:   •  dofetilide (TIKOSYN) 125 MCG capsule, TAKE 1 CAPSULE TWICE DAILY, Disp: 180 capsule, Rfl: 3  •  escitalopram (LEXAPRO) 10 MG tablet, Take 10 mg by mouth Every Morning., Disp: , Rfl:   •  furosemide (LASIX) 20 MG tablet, Take 1-2 tablets daily as needed for swelling, dyspnea, Disp: 45 tablet, Rfl: 2  •  ipratropium-albuterol (DUO-NEB) 0.5-2.5 mg/3 ml nebulizer, Take 3 mL by nebulization 2 (two) times a day., Disp: , Rfl:   •  isosorbide mononitrate (IMDUR) 60 MG 24 hr tablet, Take 1 tablet by mouth Daily., Disp: 90 tablet, Rfl: 0  •  montelukast (SINGULAIR) 10 MG tablet, Take 10 mg by mouth Daily., Disp: , Rfl:   •  nitroglycerin (NITROSTAT) 0.4 MG SL tablet, Place 1 tablet under the tongue Every 5 (Five) Minutes As Needed for Chest Pain. Take no more than 3 doses in 15 minutes., Disp: 30 tablet, Rfl: 6  •  O2 (OXYGEN), Inhale 2 L/min Daily., Disp: , Rfl:   •  omeprazole (PriLOSEC) 20 MG capsule, Take 20 mg by mouth Every Morning., Disp: " , Rfl:   •  predniSONE (DELTASONE) 10 MG tablet, Take 10 mg by mouth Daily., Disp: , Rfl:   •  Probiotic Product (4vets) capsule, Take 1 capsule by mouth Every Morning., Disp: , Rfl:   •  propranolol LA (INDERAL LA) 120 MG 24 hr capsule, Take 1 capsule by mouth 2 (two) times a day., Disp: 90 capsule, Rfl: 3  •  tiotropium bromide-olodaterol (Stiolto Respimat) 2.5-2.5 MCG/ACT aerosol solution inhaler, Inhale 1 puff Daily., Disp: , Rfl:   •  traMADol (ULTRAM) 50 MG tablet, Take 50 mg by mouth 3 (Three) Times a Day. TAKES 2 50MG TABLETS 3 TIMES DAILY, Disp: , Rfl:   •  apixaban (ELIQUIS) 2.5 MG tablet tablet, Take 2.5 mg by mouth 2 (Two) Times a Day., Disp: , Rfl:     History of Present Illness     Pt presents for follow up of AF and HTN. Since we last saw the pt, patient has been having a lot of HF symptoms with increased fluid retention with edema and SOB. She has been evaluated frequently by Dr. Araujo as well as Irene Nam with heart valve clinic. She recently was started on lasix 20 mg twice daily to help with these symptoms, but patients daughter feels she is having increased breathing issues on lasix. She had a CXR earlier today. Patient has also had an increase of AF episodes, patient becomes lethargic with episodes. Patient was seen in the ER after having a fall 3/2/21 and hit her head, CT was negative. She was taken off Eliquis 3/15/21, approved by Dr. Araujo. Wondering if she needs to go back on medication. Denies any bleeding issues on ASA, or TIA/CVA symptoms. BP has been elevated, especially in am. Ranging 170-180/100 in am 130s-150s/80-90s. Overall feels poorly.    ROS:  General:  + fatigue, - weight gain or loss  Cardiovascular:  Denies CP, PND, syncope, near syncope, +edema - palpitations.  Pulmonary:  + DENNIS, - cough, or wheezing      Vitals:    03/31/21 1619   BP: 178/98   BP Location: Left arm   Patient Position: Sitting   Pulse: 86   SpO2: 92%   Weight: 79.4 kg (175 lb)   Height: 165.1  "cm (65\")     Body mass index is 29.12 kg/m².  PE:  General: NAD  Neck: no JVD, no carotid bruits, no TM  Heart RRR, NL S1, S2, no rubs, murmurs  Lungs: diminished throughout, mild inspiratory crackles   Abd: soft, non-tender, NL BS  Ext: No musculoskeletal deformities,+  edema, cyanosis, or clubbing  Psych: normal mood and affect    Diagnostic Data:  Manual Device Interrogation: SJM PPM, RA 65%, RV 9.9%, AS/VS @ 52 bpm, 28% AMS longest 23 hours 49 minutes. 9 years on battery.       ECG 12 Lead    Date/Time: 3/31/2021 4:21 PM  Performed by: Ricardo Cross MD  Authorized by: Ricardo Cross MD   Comparison: compared with previous ECG from 3/9/2021  Similar to previous ECG  BPM: 72  Comments: Atriral paced rhythm with occasional sinus complexes and premature supraventricular complexes            1. Paroxysmal atrial fibrillation (CMS/HCC)    2. Coronary artery disease of native artery of native heart with stable angina pectoris (CMS/HCC)    3. Sick sinus syndrome (CMS/HCC)    4. Essential hypertension        Plan:    1. PAF:  - Tikosyn, Stable EKG. QTc. Stop eliquis  -28% AF longest 23 hours 49 minutes on interrogation today     2. Diastolic CHF/pulmonary fibrosis  -Worsening edema and sob, currently on lasix twice daily. CXR today 3/31/21, showed grossly unchanged findings again likely representing chronic interstitial changes. Lasix only with mild improvement: To see pulmonary MD in May. More likely due to pulm fibrosis. Also with Chenye ron breathing    3. SSS:  - PPM, Normal function. 9 years on battery     4. HTN:   -Poorly controlled on BB, Cozaar, Imdur. Add clonidine 1/2 tab 0.1 mg po qHs  -Per Dr. Gayle HUANG/up in 6 months     Scribed for Ricardo Crsos MD by JOLYNN Esqueda. 3/31/2021 16:22 EDT     I, Ricardo Cross MD, personally performed the services described in this documentation as scribed by the above named individual in my presence, and it is both accurate and complete.  3/31/2021 "  16:57 EDT

## 2021-04-01 NOTE — TELEPHONE ENCOUNTER
Dr. Cross saw the patient in clinic yesterday and wanted her to see Dr. David sooner that May 11. She is having an increased shortness of breath that has only had minimal improvement with Lasix.I called and spoke with the office. They are going to work her in with a provider ASAP and call the patient with an appointment.

## 2021-04-02 NOTE — TELEPHONE ENCOUNTER
[facility] in [place] called requesting a refill of furosemide 20 mg be sent to [facility] in [place] for patient. Facility requested that the prescription state exactly how the medication is to be administered. Per Irene's last encounter with patient and from speaking with patient's daughter,  is taking the medication three times weekly on MWF.

## 2021-04-09 PROBLEM — J84.10 PULMONARY FIBROSIS (HCC): Status: ACTIVE | Noted: 2021-01-01

## 2021-04-09 PROBLEM — I26.99 ACUTE PULMONARY EMBOLISM (HCC): Status: ACTIVE | Noted: 2021-01-01

## 2021-04-09 NOTE — PROGRESS NOTES
Chary Boucher is a 87 y.o. female here for evaluation of   Chief Complaint   Patient presents with   • Pulmonary Embolism       Problem list:  1. Chronic hypoxic respiratory failure  2. Acute pulmonary embolism, April 3, 2021  3. Chronic diastolic heart failure, EF 60 to 65%  4. COPD/former smoker, quit in 1987 after 37 pack years  5. Pulmonary fibrosis  6. Atrial fibrillation, dual-chamber pacemaker, generator change 2017  7. Coronary artery disease last heart cath April 2017 with stent of LAD and circumflex  8. Peripheral vascular disease, several lower extremity stents  9. Frequent falls  10. GERD, hiatal hernia  11. Chronic back pain  12. Hypertension  13. Dyslipidemia  14. Osteoarthritis  15. Thoracolumbar scoliosis, kyphosis  16. Chronic back pain  17. Osteoporosis  18. Small abdominal aortic aneurysm  19. Left wrist fracture x2  20. Appendectomy  21. Cholecystectomy  22. Right carotid stent  23. Bilateral carpal tunnel release  24. Bilateral cataract extraction with anterior vitrectomy  25. Colonoscopy  26. Kyphoplasty  27. Bilateral total knee replacements  28. Right rotator cuff repair  29. Tubal ligation  30. Childbirth x3  31. Allergies: Zithromax hives, Cipro and Levaquin anaphylaxis, Septra anaphylaxis, Pacerone hallucinations, Celebrex GI intolerance, Keflex unknown, Niaspan unknown, adhesive tapes rash    History of Present Illness    Chronically ill 87-year-old woman, remote smoker, with diastolic heart failure, coronary artery disease, atrial fibrillation, hypertension, osteoporosis, osteoarthritis, peripheral vascular disease, chronic back pain, unsteady gait with frequent falls, pulmonary embolism, permanent pacemaker, hospitalized in [place] April 3 through April 8 for pulmonary emboli.  She presented with worsening shortness of air and desaturation.  Her Eliquis has been stopped approximately 3 weeks prior to this admission because she fell and struck her head with ecchymosis and concern  for risk of bleeding.  She had a duplex of her legs that revealed a left popliteal clot, echocardiogram revealing an EF of 60 to 65% without right ventricular strain and a CT of her chest revealing pulmonary emboli.  She also had a positive blood culture that was felt to be a contaminant.  She was placed back on her Eliquis at discharge.  She started smoking as a teenager and quit smoking in 1987 after 37 pack years.  She never required inhalers or supplemental oxygen until approximately 5 years ago.  At that time she started wearing oxygen at night for her congestive heart failure.  She is also been evaluated for COPD and was placed on stiolto 2 puffs daily as well as a nebulizer twice daily.  Her daughter does hear a constriction in her throat and occasional wheezing.  She was given antibiotics bronchodilators and steroids during her admission for her pulmonary embolism.  She was seen in the heart valve clinic at [facility]  prior to this hospitalization and treated aggressively with diuretics for worsening edema and shortness of breath.  Her daughter is told that she now spends about 29% of her time in atrial fibrillation.  Patient is not aware of palpitations.  She has a lot of chronic back pain and scoliosis.  She has an unsteady gait and tends to lean toward the right.  She was hospitalized last September with heart failure and COPD.  This was again in [place].  They started her on prednisone and left her on 10 mg a day chronically.  In December her breathing seemed to worsen her edema worsened and that is when they started increasing her diuretics at the heart valve clinic.  She has completed both COVID-19 vaccines.  She was evaluated in our sleep clinic and a sleep study was recommended for possible obstructive sleep apnea.  She has become more debilitated and needs assistance with transfers and predominantly uses a wheelchair.  Her daughter reports that when she broke her left wrist for the second time the  orthopedic surgeon did not want her to use a walker and put pressure on the wrist and she started using a wheelchair more often.  Since that event she has become weaker and is minimally ambulatory.  She needs assistance for bathing and dressing.  Since discharge from the hospital she is now on 4 L of oxygen compared to 2-1/2 L.  She was on full-time oxygen 2-1/2 L prior to that admission.  She has not been intubated or required mechanical ventilation.    Review of Systems   Constitutional: Positive for activity change and fatigue.   HENT: Positive for congestion and postnasal drip.    Respiratory: Negative for apnea, cough, chest tightness and wheezing.    Cardiovascular: Positive for palpitations and leg swelling. Negative for chest pain.   Gastrointestinal: Negative for abdominal distention, constipation, diarrhea and nausea.   Endocrine: Negative.    Genitourinary: Positive for difficulty urinating.   Musculoskeletal: Positive for arthralgias, back pain and gait problem. Negative for neck pain.   Skin: Negative for rash.   Allergic/Immunologic: Positive for environmental allergies.   Neurological: Positive for weakness.        Weak on right side   Hematological: Bruises/bleeds easily.   Psychiatric/Behavioral:        Short term memory         Current Outpatient Medications:   •  amoxicillin (AMOXIL) 875 MG tablet, Take 875 mg by mouth 2 (Two) Times a Day., Disp: , Rfl:   •  apixaban (ELIQUIS) 2.5 MG tablet tablet, Take 2.5 mg by mouth 2 (Two) Times a Day., Disp: , Rfl:   •  aspirin 81 MG EC tablet, Take 81 mg by mouth Daily., Disp: , Rfl:   •  atorvastatin (LIPITOR) 40 MG tablet, Take 40 mg by mouth Every Night., Disp: , Rfl:   •  busPIRone (BUSPAR) 5 MG tablet, Take 5 mg by mouth 2 (Two) Times a Day., Disp: , Rfl:   •  Calcium Polycarbophil (FIBER-CAPS PO), Take 1 capsule by mouth Daily., Disp: , Rfl:   •  cetirizine (zyrTEC) 10 MG tablet, Take 10 mg by mouth Daily., Disp: , Rfl:   •  cloNIDine (CATAPRES) 0.1 MG  tablet, Take  by mouth Daily As Needed for High Blood Pressure. Takes 0.5 daily as needed , Disp: , Rfl:   •  dofetilide (TIKOSYN) 125 MCG capsule, TAKE 1 CAPSULE TWICE DAILY, Disp: 180 capsule, Rfl: 3  •  escitalopram (LEXAPRO) 10 MG tablet, Take 10 mg by mouth Every Morning., Disp: , Rfl:   •  furosemide (LASIX) 20 MG tablet, Lasix 20 mg, 1 tablet Mon, Wed, Fri., Disp: 12 tablet, Rfl: 3  •  ipratropium-albuterol (DUO-NEB) 0.5-2.5 mg/3 ml nebulizer, Take 3 mL by nebulization 2 (two) times a day., Disp: , Rfl:   •  isosorbide mononitrate (IMDUR) 60 MG 24 hr tablet, Take 1 tablet by mouth Daily., Disp: 90 tablet, Rfl: 0  •  montelukast (SINGULAIR) 10 MG tablet, Take 10 mg by mouth Daily., Disp: , Rfl:   •  nitroglycerin (NITROSTAT) 0.4 MG SL tablet, Place 1 tablet under the tongue Every 5 (Five) Minutes As Needed for Chest Pain. Take no more than 3 doses in 15 minutes., Disp: 30 tablet, Rfl: 6  •  O2 (OXYGEN), Inhale 2 L/min Daily., Disp: , Rfl:   •  omeprazole (PriLOSEC) 20 MG capsule, Take 20 mg by mouth Every Morning., Disp: , Rfl:   •  predniSONE (DELTASONE) 10 MG tablet, Take 10 mg by mouth Daily., Disp: , Rfl:   •  Probiotic Product (Maxymiser) capsule, Take 1 capsule by mouth Every Morning., Disp: , Rfl:   •  propranolol LA (INDERAL LA) 120 MG 24 hr capsule, Take 1 capsule by mouth 2 (two) times a day., Disp: 90 capsule, Rfl: 3  •  traMADol (ULTRAM) 50 MG tablet, Take 50 mg by mouth 3 (Three) Times a Day. TAKES 2 50MG TABLETS 3 TIMES DAILY, Disp: , Rfl:     Past Medical History:   Diagnosis Date   • Abdominal aortic aneurysm (CMS/HCC) 10/5/2016    Ultrasound of the abdomen on 03/18/2015 with size measured at 2.3 centimeters.     • Bradycardia 10/5/2016   • Broken wrist 12/13/2018   • COPD (chronic obstructive pulmonary disease) (CMS/[facility]) 10/5/2016   • Coronary disease 10/5/2016    Left heart catheterization 2002, multi-vessel coronary disease/medical therapy. Stress Cardiolite 2004, no ischemia.  Chest pain/left heart catheterization, December 2008, Dr. Mendoza revealing significant LV stenosis treated with drug eluting stent.  Normal LV function/”spasm” right coronary artery (catheter induced). Stress Cardiolite, 07/27/2010: Mild anterior ischemia with questionable artifact. Normal LV size and function.   Lexiscan Cardiolite, 03/25/2015 showing left ventricular ejection fraction 75%, no ischemia.    • CVA (cerebral vascular accident) (CMS/HCC) 10/5/2016   • Degenerative joint disease 10/5/2016    Degenerative joint disease/arthritis   • Dyslipidemia 10/5/2016   • Fall    • GERD (gastroesophageal reflux disease) 10/5/2016   • Hypertension 10/5/2016   • Hypertensive heart disease 10/5/2016   • Osteoporosis 10/5/2016   • Peripheral vascular disease (CMS/[facility]) 10/5/2016    Severe right common iliac stenosis. Abdominal aortic aneurysm measuring 2 cm in March 2008. Status post successful PTCA and placement of stent, December 2008. Status-post PTCA and stenting of the right ICA, 09/25/2008   • Syncope 10/5/2016   • TIA (transient ischemic attack) 10/5/2016     Past Surgical History:   Procedure Laterality Date   • APPENDECTOMY     • CARDIAC CATHETERIZATION N/A 4/26/2017    Procedure: Left Heart Cath;  Surgeon: Jesus Araujo MD;  Location:  [facility];  Service:    • CARDIAC ELECTROPHYSIOLOGY PROCEDURE N/A 10/10/2017    Procedure: PPM generator change - dual- MDT;  Surgeon: Ricardo Bassett MD;  Location:  [facility] EP INVASIVE LOCATION;  Service:    • CAROTID STENT Right    • CARPAL TUNNEL RELEASE Bilateral    • CATARACT EXTRACTION BILATERAL W/ ANTERIOR VITRECTOMY     • CHOLECYSTECTOMY     • COLONOSCOPY     • CORONARY STENT PLACEMENT      TO THE LAD   • ILIAC ARTERY STENT     • KYPHOPLASTY     • PACEMAKER IMPLANTATION  2008    PER DR. BASSETT   • REPLACEMENT TOTAL KNEE BILATERAL      3-4 YEARS IN BETWEEN    • ROTATOR CUFF REPAIR Right    • TUBAL ABDOMINAL LIGATION       Social History  "    Socioeconomic History   • Marital status:      Spouse name: Not on file   • Number of children: 3   • Years of education: Not on file   • Highest education level: Not on file   Tobacco Use   • Smoking status: Former Smoker     Packs/day: 1.00     Years: 37.00     Pack years: 37.00     Types: Cigarettes     Start date:      Quit date: 1987     Years since quittin.2   • Smokeless tobacco: Never Used   • Tobacco comment: STATES QUIT SMOKING IN    Substance and Sexual Activity   • Alcohol use: No   • Drug use: No   • Sexual activity: Defer     Family History   Problem Relation Age of Onset   • Heart disease Mother    • Emphysema Mother      Blood pressure 130/82, pulse 60, temperature 97.5 °F (36.4 °C), height 162.6 cm (64\"), weight 79.4 kg (175 lb), SpO2 92 %.    Physical Exam  Constitutional:       Appearance: She is ill-appearing.   HENT:      Head: Normocephalic and atraumatic.      Nose: Congestion present.      Mouth/Throat:      Mouth: Mucous membranes are moist.      Pharynx: Oropharynx is clear.   Eyes:      General: No scleral icterus.     Pupils: Pupils are equal, round, and reactive to light.   Neck:      Comments: Limited ROM  Cardiovascular:      Rate and Rhythm: Regular rhythm.      Heart sounds: Normal heart sounds. No murmur heard.     Pulmonary:      Effort: Pulmonary effort is normal.      Comments: Fine crackles left base, no wheezing  Abdominal:      General: Bowel sounds are normal. There is no distension.      Palpations: Abdomen is soft.      Tenderness: There is no abdominal tenderness.   Musculoskeletal:      Right lower leg: No edema.      Left lower leg: No edema.   Lymphadenopathy:      Cervical: No cervical adenopathy.   Skin:     General: Skin is warm and dry.      Comments: Ecchymosis left arm   Neurological:      Mental Status: She is alert.      Motor: Weakness present.      Comments: UE drift bilateral L>R.  weak. LE 4/5 str   Psychiatric:      " Comments: Falls asleep in her wheel chair. Disoriented to date. Place           PFTs:  No PFTs available for review  Radiology:  COMPARISON: 6/9/2020     FINDINGS: Left chest wall ICD projects unchanged. Persistent pulmonary  vascular engorgement and interstitial prominence bilaterally. No new  focal lobar consolidation. Unchanged degree of cardiac enlargement. No  distinct pneumothorax.     IMPRESSION:  Grossly unchanged findings again likely representing  cardiogenic interstitial edema.     This report was finalized on 3/31/2021 3:51 PM by Nino Zarate.       Lab:  April 8, 2021, sodium 138, potassium 4.3, chloride 102, bicarbonate 28, BUN 20, creatinine 1, magnesium 2.2, calcium 9.4, white count 10.9, hemoglobin 12.7, hematocrit 42.4, platelet count 197  Diagnoses and all orders for this visit:    1. Pulmonary fibrosis (CMS/HCC) (Primary)    2. Acute pulmonary embolism without acute cor pulmonale, unspecified pulmonary embolism type (CMS/HCC)    3. Gastroesophageal reflux disease, unspecified whether esophagitis present    4. Hypoxemia requiring supplemental oxygen        Discussion:   87-year-old woman, chronically ill with coronary artery disease, previous stents, peripheral vascular disease with previous stents, atrial fibrillation, high blood pressure, carotid stent, possible COPD, suspected interstitial lung disease, onset of pulmonary embolism after Eliquis stopped here for evaluation.  On examination she is quite weak leans to the right and has a motor drift.  She has fallen and broken that left wrist twice and she fell and hit her head earlier causing her Eliquis to be discontinued.  This resulted in DVT and pulmonary embolism.  She of course has been restarted on anticoagulation but long-term she is at risk for falls and a poor candidate for chronic anticoagulation.  I would at least entertain IVC filter and left atrial appendage ligation if feasible.  She has diastolic dysfunction not systolic  dysfunction.  Clinically I feel her chest x-ray looks fibrotic or interstitial in nature.  She really does not have pleural effusions.  I do not have a plain CAT scan just the CTAs.  These are brighter than the regular CAT scans and it is difficult to tell interstitial lung disease on the CT angiogram.  She stopped smoking in 1987 and does not have evidence of moth-eaten emphysematous lungs on chest x-ray or CTA of the chest.  While she may have a component of obstructive disease I suspect the bigger problem is fibrosis, restriction from her kyphosis and her generalized debilitated state.  I am concerned about her swallowing and that she has a significant hiatal hernia with reflux.  She has had both Covid vaccines.  She did see our sleep clinic and a sleep study was recommended.  I do think it should be done but after she is a bit stronger.  She would not be a good candidate for antifibrotic drugs secondary to side effects.  Today at rest on 4 L her saturation was 92%.    Increase oxygen to 6 L and continuous flow during physical therapy and Occupational Therapy    Stop Stiolto    Continue DuoNeb twice daily scheduled and up to 4 times daily as needed for wheezing or coughing    She is taking prednisone 10 mg daily chronically and I will continue this although I do not like to use steroids chronically.  I wanted to review her CT scans before I make a decision on whether I wish to taper    Continue Singulair 10 mg daily and Zyrtec 10 mg at night for her allergies    Continue Eliquis    Continue her proton pump inhibitor    Get copies of her CT scans from [place], the ones that are not angiograms as well as her pulmonary function tests    If she can get a little more mobile than I would try a sleep study to look for central sleep apnea    Monitor at mealtimes and consider a speech evaluation if signs of dysphagia    Follow-up in 3 months    Jing Mckeon MD

## 2021-04-20 NOTE — PROGRESS NOTES
Faxed orders to [facility]  Amanda Gutiérrez to increase 02 to 6 liters  During  PT and OT  Fax #  [phone] and  Provided copy  for  Daughter Zelda

## 2021-04-20 NOTE — TELEPHONE ENCOUNTER
Patients daughter called an stated her mother was in the ER from 04/02-04/08 and was told to d/c the furosemide. Patients daughter stated she went to visit her mother yesterday at her personal care facility and the patient has pitting edema in her lower extremities. Patients daughter stated [facility] needs an order faxed to them stating it is fine for the patient to resume furosemide. Patients's daughter stated she believes 3x per week is to hard on her mother and does not want her to get dehydrated so requested that the medication be written for 2x per week and that the nursing staff measure patients I&O and report back to the H&V clinic.     [facility] fax number: [phone]

## 2021-04-21 NOTE — TELEPHONE ENCOUNTER
Called and advised patients daughter that the fax for her mother to resume her lasix was faxed today to [facility]. Patients daughter verbalized understanding and had no further questions at this time.

## 2021-04-22 NOTE — TELEPHONE ENCOUNTER
Patient's daughter requests that order for lasix be documented so that she has access to the order on patient's Mychart. Order was faxed to [facility] on 4/20 for Lasix 20mg po two times weekly #8 with 3 refills. Explained this to patient's daughter but she is requesting something be placed into Mychart so that she can print the orders for her mother's folder.

## 2021-05-13 NOTE — TELEPHONE ENCOUNTER
Patients daughter called and stated her mother is still having fluid overload. Patients daughter stated her mother has edema in both lower extremities, she is very lethargic today and patients daughter stated the patients color seemed off today as well. Patients daughter stated she does not believe the Lasix 20mg twice weekly is helping take the fluid off. Patient is having difficulty with her O2 levels during PT dropping into the 80's. Patients most recent BP and HR are as follows:   BP: 134/82  HR: 85

## 2021-05-14 NOTE — TELEPHONE ENCOUNTER
Patients daughter called regarding the patients Lasix. Patient would like a call back to discuss what to do.

## 2021-05-14 NOTE — TELEPHONE ENCOUNTER
Spoke with patient's daughter and she notes that her legs are swollen. Patient appears to be short of breath.  Will increase Lasix to 20 mg 3 times weekly with first dose on Sunday and then continue dosing on Tuesdays and Fridays.  Order to be faxed to [facility] [phone]

## 2021-07-08 NOTE — TELEPHONE ENCOUNTER
Recent Chest CT/Chest Xray requested from [facility] as requested per pt's daughter.

## 2021-07-09 NOTE — PROGRESS NOTES
"[facility] Pulmonary Follow up      Chief Complaint  Pulmonary fibrosis (CMS/HCC) and Follow-up    Subjective          Chary Boucher presents to [facility] for routine follow-up.  She was last seen by Dr. Mckeon after hospitalization last April for an acute pulmonary embolism and worsening hypoxic respiratory failure.    At this time she has finished with her acute rehab and is back to living at her assisted living at [facility].  She is back to using 3 L since June.  They have been monitoring her oxygen saturations with activity.  She is on continuous flow oxygen, she does have difficulty with pulse dose.    She recently finished a round of 5 days of Rocephin and 7 days of doxycycline for a UTI and pneumonia in June.  She did have a chest x-ray done at [place] that showed some questionable pneumonia.    Today she denies any cough or congestion.  Her shortness of breath seems to be about at baseline.    Apparently on the ride in today she had a \"episode\" where she gets tachypneic, but it seems to be related to when she goes into A. fib.  The episodes last for a few seconds to a minute.  She does have a follow-up with cardiology to get her pacemaker checked early next month.        Objective     Vital Signs:   /80   Pulse 80   Temp 97.1 °F (36.2 °C)   Ht 162.6 cm (64\")   SpO2 97% Comment: resting, 3 liters cont  BMI 30.04 kg/m²       Immunization History   Administered Date(s) Administered   • COVID-19 (PFIZER) 01/26/2021, 02/23/2021   • Pneumococcal Conjugate 13-Valent (PCV13) 10/16/2015   • Tdap 02/24/2015, 03/03/2021       Physical Exam  Vitals reviewed.   Constitutional:       Appearance: She is well-developed.   HENT:      Head: Normocephalic and atraumatic.   Eyes:      Pupils: Pupils are equal, round, and reactive to light.   Cardiovascular:      Rate and Rhythm: Normal rate and regular rhythm.      Heart sounds: No murmur heard. "     Pulmonary:      Effort: Pulmonary effort is normal. No respiratory distress.      Breath sounds: Normal breath sounds. No wheezing or rales.   Abdominal:      General: Bowel sounds are normal. There is no distension.      Palpations: Abdomen is soft.   Musculoskeletal:         General: Normal range of motion.      Cervical back: Normal range of motion and neck supple.   Skin:     General: Skin is warm and dry.      Findings: No erythema.   Neurological:      Mental Status: She is alert and oriented to person, place, and time.   Psychiatric:         Behavior: Behavior normal.          Result Review :                       Assessment and Plan    Problem List Items Addressed This Visit        Pulmonary and Pneumonias    COPD (chronic obstructive pulmonary disease) (CMS/HCC) - Primary    Hypoxemia requiring supplemental oxygen    Pulmonary fibrosis (CMS/HCC)          We follow her here in the office for hypoxic respiratory failure with underlying COPD and pulmonary fibrosis.  It appears after her recent hospitalization she seems to be improving and close to baseline.    She is back to her 3 L continuous flow.  They are interested in trying to get a continuous flow portable concentrator to help with her ambulating and activities at her assisted living.  We will see if we can facilitate that with her DME.    She has not been getting her nebulizers, they have been scheduled as needed.  We will get those rescheduled back to twice daily.    She is on chronic prednisone at 10 mg daily.      Follow-up with Dr. Mckeon in 3 months or as needed.      I spent 25 minutes caring for Chary on this date of service. This time includes time spent by me in the following activities:preparing for the visit, reviewing tests, obtaining and/or reviewing a separately obtained history, performing a medically appropriate examination and/or evaluation , counseling and educating the patient/family/caregiver, ordering medications,  tests, or procedures, referring and communicating with other health care professionals , documenting information in the medical record and independently interpreting results and communicating that information with the patient/family/caregiver  Follow Up     No follow-ups on file.  Patient was given instructions and counseling regarding her condition or for health maintenance advice. Please see specific information pulled into the AVS if appropriate.     JOLYNN Malik, ACNP  [facility] Pulmonary Critical Care Medicine  Electronically signed

## 2021-08-23 NOTE — PROGRESS NOTES
[facility] AT [address], [address]  [place], [place], [id number] [phone]  WWW.[email or web address]           OUTPATIENT CLINIC FOLLOW UP NOTE    Patient Care Team:  Patient Care Team:  Saige Tobias MD as PCP - General (Internal Medicine)  Dex Kelly MD as Consulting Physician (Cardiology)  Ricardo Cross MD as Consulting Physician (Cardiology)    Subjective:      Chief Complaint   Patient presents with   • Paroxysmal atrial fibrillation (CMS/HCC)       HPI:    Chary Boucher is a 87 y.o. female.  Cardiac problem list:  1. Coronary artery disease:  a. Left heart catheterization 2002, multi-vessel coronary disease/medical therapy.  b. Stress Cardiolite 2004, no ischemia.  c. Chest pain/left heart catheterization, December 2008, Dr. Mendoza revealing significant stenosis treated with drug eluting stent. Normal LV function/”spasm” right coronary artery (catheter induced).  d. Stress Cardiolite, 07/27/2010: Mild anterior ischemia with questionable artifact. Normal LV size and function.   e. Lexiscan Cardiolite, 03/25/2015 showing left ventricular ejection fraction 75%, no ischemia.   f. [facility] Dr Araujo with PTCA/stenting of LAD and LCFX with ESTEPHANIA 4/2017  2. Peripheral vascular disease:  a. Severe right common iliac stenosis.  b. Status post successful PTCA and placement of stent, December 2008.  c. Abdominal aortic aneurysm  3. Carotid stenosis: Status-post PTCA and stenting of the right ICA, 09/25/2008.   4. Atrial fibrillation:  a. Tikosyn therapy 10/25/2007.  b. Medtronic dual chamber pacemaker.  c. Generator change ([facility]) 10/10/17  5. Essential Hypertension.  6. Dyslipidemia.  7. Degenerative joint disease/arthritis.  8. GERD.  9. Osteoporosis.  10. Possible abdominal aortic aneurysm:  a. Ultrasound of the abdomen on 03/18/2015 with size measured at 2.3 centimeters.  11. COPD/Pulmonary Fibrosis  12. Tremor, on propranolol    The patient presents today  for follow-up    [facility]  Works with PT.  Has midday fatigue.  Concerned about lower blood pressure in the afternoons possibly exacerbated by propranolol dosing in the mornings.  Swelling stable on Lasix 20 mg daily.    Review of Systems:  Positive for fatigue, mild lower extremity swelling  Negative for exertional chest pain, syncope.     PFSH:  Patient Active Problem List   Diagnosis   • Coronary artery disease of native artery of native heart with stable angina pectoris (CMS/HCC)   • Peripheral vascular disease (CMS/HCC)   • Paroxysmal atrial fibrillation (CMS/HCC)   • Essential hypertension   • Dyslipidemia   • GERD (gastroesophageal reflux disease)   • Osteoporosis   • Abdominal aortic aneurysm (CMS/HCC)   • Bradycardia   • Hypertensive heart disease   • TIA (transient ischemic attack)   • CVA (cerebral vascular accident) (CMS/HCC)   • Syncope   • COPD (chronic obstructive pulmonary disease) (CMS/HCC)   • Chest pain   • Sick sinus syndrome (CMS/HCC)   • Carotid stenosis, asymptomatic, bilateral   • Hypoxemia requiring supplemental oxygen   • ALCIDES (obstructive sleep apnea) - possible   • Excessive daytime sleepiness   • Snoring   • Acute pulmonary embolism (CMS/HCC)   • Pulmonary fibrosis (CMS/HCC)         Current Outpatient Medications:   •  aspirin 81 MG EC tablet, Take 81 mg by mouth Daily., Disp: , Rfl:   •  atorvastatin (LIPITOR) 40 MG tablet, Take 40 mg by mouth Every Night., Disp: , Rfl:   •  busPIRone (BUSPAR) 5 MG tablet, Take 5 mg by mouth 2 (Two) Times a Day., Disp: , Rfl:   •  Calcium Polycarbophil (FIBER-CAPS PO), Take 1 capsule by mouth Daily., Disp: , Rfl:   •  cetirizine (zyrTEC) 10 MG tablet, Take 10 mg by mouth Daily., Disp: , Rfl:   •  cloNIDine (CATAPRES) 0.1 MG tablet, Take  by mouth Daily As Needed for High Blood Pressure. Takes 0.5 daily as needed , Disp: , Rfl:   •  dofetilide (TIKOSYN) 125 MCG capsule, TAKE 1 CAPSULE BY MOUTH TWO TIMES A DAY, Disp: 56 capsule,  Rfl: 11  •  Eliquis 5 MG tablet tablet, TAKE ONE TABLET BY MOUTH TWICE A DAY, Disp: 4 tablet, Rfl: 11  •  escitalopram (LEXAPRO) 10 MG tablet, Take 20 mg by mouth Every Morning., Disp: , Rfl:   •  fluticasone (FLONASE) 50 MCG/ACT nasal spray, 2 sprays into the nostril(s) as directed by provider Daily. PRN, Disp: , Rfl:   •  furosemide (LASIX) 20 MG tablet, Take 1 tablet by mouth Daily., Disp: 90 tablet, Rfl: 3  •  ipratropium-albuterol (DUO-NEB) 0.5-2.5 mg/3 ml nebulizer, Take 3 mL by nebulization 2 (two) times a day., Disp: , Rfl:   •  isosorbide mononitrate (IMDUR) 60 MG 24 hr tablet, TAKE ONE TABLET BY MOUTH DAILY, Disp: 30 tablet, Rfl: 11  •  montelukast (SINGULAIR) 10 MG tablet, Take 10 mg by mouth Daily., Disp: , Rfl:   •  nitroglycerin (NITROLINGUAL) 0.4 MG/SPRAY spray, , Disp: , Rfl:   •  nitroglycerin (NITROSTAT) 0.4 MG SL tablet, Place 1 tablet under the tongue Every 5 (Five) Minutes As Needed for Chest Pain. Take no more than 3 doses in 15 minutes., Disp: 30 tablet, Rfl: 6  •  nystatin (MYCOSTATIN) 476491 UNIT/GM powder, Apply 1 application topically to the appropriate area as directed 3 (Three) Times a Day. PRN, Disp: , Rfl:   •  O2 (OXYGEN), Inhale 3 L/min Daily., Disp: , Rfl:   •  omeprazole (PriLOSEC) 20 MG capsule, Take 20 mg by mouth Every Morning., Disp: , Rfl:   •  ondansetron (ZOFRAN) 8 MG tablet, Take 8 mg by mouth Every 8 (Eight) Hours As Needed for Nausea or Vomiting., Disp: , Rfl:   •  potassium chloride (K-DUR,KLOR-CON) 20 MEQ CR tablet, @@ TAKE ONE TABLET BY MOUTH TWICE A DAY, Disp: 56 tablet, Rfl: 10  •  predniSONE (DELTASONE) 10 MG tablet, Take 10 mg by mouth Daily., Disp: , Rfl:   •  Probiotic Product (AGILE customer insight) capsule, Take 1 capsule by mouth Every Morning., Disp: , Rfl:   •  propranolol LA (INDERAL LA) 60 MG 24 hr capsule, Take 1 capsule by mouth Take As Directed. Take 60mg in the morning and take 120mg in the evenings, approximately 12 hours apart, Disp: 90 capsule, Rfl:  "11  •  traMADol (ULTRAM) 50 MG tablet, Take 50 mg by mouth 3 (Three) Times a Day. TAKES 2 50MG TABLETS 3 TIMES DAILY, Disp: , Rfl:   •  Aspirin Low Dose 81 MG chewable tablet, TAKE ONE TABLET BY MOUTH DAILY, Disp: 30 tablet, Rfl: 11    Allergies   Allergen Reactions   • Azithromycin Hives   • Ciprofloxacin Anaphylaxis   • Levaquin [Levofloxacin] Anaphylaxis   • Septra [Sulfamethoxazole-Trimethoprim] Anaphylaxis   • Epinephrine Other (See Comments)     \"SHAKES ALL OVER\"   • Pacerone [Amiodarone] Hallucinations   • Adhesive Tape Rash   • Celebrex [Celecoxib] GI Intolerance     \"GI PAIN\"   • Keflex [Cephalexin] Other (See Comments)     \"UNKNOWN\"   • Niaspan [Niacin Er] Other (See Comments)     \"UNKNOWN\"        reports that she quit smoking about 34 years ago. Her smoking use included cigarettes. She started smoking about 71 years ago. She has a 37.00 pack-year smoking history. She has never used smokeless tobacco.    Past family medical history: No immediate family history of premature coronary artery disease.      Objective:   Physical exam:  /78   Pulse 81   Ht 162.6 cm (64.02\")   LMP  (LMP Unknown)   SpO2 90% Comment: room air  BMI 30.02 kg/m²   CONSTITUTIONAL: No acute distress  RESPIRATORY: Normal effort. Clear to auscultation bilaterally without wheezing or rales  CARDIOVASCULAR: Regular rate and rhythm with normal S1 and S2. Without murmur, gallop or rub.  PERIPHERAL VASCULAR: Normal radial pulse. There is no significant lower extremity edema bilaterally.  PSYCH: Normal affect and mood    Labs:    BUN   Date Value Ref Range Status   08/20/2021 23 8 - 23 mg/dL Final     Creatinine   Date Value Ref Range Status   08/20/2021 1.19 (H) 0.57 - 1.00 mg/dL Final     Potassium   Date Value Ref Range Status   08/20/2021 4.2 3.5 - 5.2 mmol/L Final     ALT (SGPT)   Date Value Ref Range Status   06/09/2020 14 1 - 33 U/L Final     AST (SGOT)   Date Value Ref Range Status   06/09/2020 18 1 - 32 U/L Final       Lab " Results   Component Value Date    CHOL 181 04/26/2017     Lab Results   Component Value Date    TRIG 224 (H) 04/26/2017     Lab Results   Component Value Date    HDL 46 04/26/2017     Lab Results   Component Value Date    LDL 95 04/26/2017     No components found for: LDLDIRECTC    5/22/2020 PCP labs: , BUN 20, Cr 1.37, Sodium 140, Potassium 4.0    9/2020 [facility] facility labs: Potassium 3.9, creatinine 1.5, hemoglobin 14.6, platelets 244    1/2021 Cr 1.3    Diagnostic Data:    Procedures    EKG, [facility], 9/19/2020: A. fib with PVCs versus aberrancy, heart rate 92, QRS 80, QTc 521    DEVICE INTERROGATION:  Interrogation date 8/23/21,  [facility]-   RA pacing 70%, RV pacing >12%. Threshold and impedances are acceptable acceptable. Battery voltage is 9 yrs. 24% AMS.    DEVICE INTERROGATION:  [facility], Interrogation date 12/21/2020-   26% mode switching.     Echo, [facility], 9/2020: EF 55 to 60%, grade 1 diastolic dysfunction, aortic valve sclerosis without stenosis, mild mitral annular calcification, normal left atrial size, normal RV and RA size/function, mild tricuspid regurgitation with a peak regurgitation velocity of 2.8 m/s    Echocardiogram 8/22/19: EF 60-65%, no significant valvular abnormalities    [facility] 4/2017  · Severe CAD involving the mid LAD and mid circumflex.  · There was a discrete, hazy 60% mid LAD stenosis that was found to be functional significant with an iFR 0.85.  The stenosis is now status post intervention with a Xience Alpine 3.0x18 mm drug-eluting stent  · There was a 70% mid circumflex stenosis that is now status post intervention with a Xience Alpine 2.5 x 23 mm drug-eluting stent    PFTs 2/2020 - Dr Null, pulmonology, moderate reduction in FVC suggestive of restrictive defect, but FEV1/FVC was normal, flow volume loop compatible with restrictive defect    Carotid duplex ultrasound 12/2019, outside facility: Less than 20% proximal ICA stenosis bilaterally, antegrade  flow in the vertebral arteries bilaterally    Assessment and Plan:     Essential hypertension  CKD  -Blood pressure stable in the 120-150s range with blood pressure measurements at [facility] 3 times a day.  No low blood pressures noted recently.  -With that said, the patient's blood pressure has been lower at her physician office follow-ups recently and family members have noted a lower blood pressure in the afternoons  -Decrease propranolol to 60 mg in the morning, continue 120 mg in the evenings  -Continue isosorbide mono nitrate and Lasix at current dosing    Prior medicine side effects:  -Amlodipine: swelling but had a good BP response   -Clonidine: Did not tolerate, side effects not clear  -Cardura: Urinary retention   -Losartan: Occasional JOSIE    Sick sinus syndrome  Atrial fibrillation  -Status post pacemaker, followed by EP  - stable mode switching  -Discussed trying to take Tikosyn 12 hours apart.  Instructed [facility] to do so  -Upcoming follow-up with Dr. Cross    -Continue Eliquis  -Continue Tikosyn and propranolol  -Can consider increasing Tikosyn, but has CKD.  Could consider amiodarone but has advanced lung disease.    Dyspnea  Lower extremity edema  -Currently stable, Lasix 20mg daily.     Coronary artery disease involving native coronary artery of native heart without angina pectoris  -Currently without chest pain.  -Continue aspirin, statin, beta-blocker, Imdur.  -Didn't tolerate Ranexa     Peripheral vascular disease   -Decreased pulses on the right pedal vessels in the past.  -Patient not ambulatory or very active  -Monitoring clinically    Carotid stenosis, asymptomatic, bilateral  -Mild as of 12/2019, continue to monitor clinically.    Restrictive lung disease  Possible sleep apnea  -On home O2, follows up with pulmonology in [place], [place], with Dr. Null  -Seen by sleep medicine, holding off on sleep study during Covid pandemic    - Return in about 6 months (around 2/23/2022) for  Next scheduled follow up.        Jesus Araujo MD  08/23/21 13:21 EDT

## (undated) DEVICE — TR BAND RADIAL ARTERY COMPRESSION DEVICE: Brand: TR BAND

## (undated) DEVICE — LIMB HOLDERS: Brand: DEROYAL

## (undated) DEVICE — LEX ELECTRO PHYSIOLOGY: Brand: MEDLINE INDUSTRIES, INC.

## (undated) DEVICE — GLIDESHEATH SLENDER STAINLESS STEEL KIT: Brand: GLIDESHEATH SLENDER

## (undated) DEVICE — DEV INFL MONARCH 25W

## (undated) DEVICE — CATH IMG IVUS EAGLE EYE PLATIN RX DIGITAL .014IN 5FR

## (undated) DEVICE — DRSNG SURESITE123 4X4.8IN

## (undated) DEVICE — MEDI-VAC YANKAUER SUCTION HANDLE W/BULBOUS TIP: Brand: CARDINAL HEALTH

## (undated) DEVICE — ST EXT IV SMARTSITE 2VLV SP M LL 5ML IV1

## (undated) DEVICE — CANNULA,ADULT,SOFT-TOUCH,7'TUBE,UC: Brand: PENDING

## (undated) DEVICE — 3M™ STERI-STRIP™ REINFORCED ADHESIVE SKIN CLOSURES, R1547, 1/2 IN X 4 IN (12 MM X 100 MM), 6 STRIPS/ENVELOPE: Brand: 3M™ STERI-STRIP™

## (undated) DEVICE — PENCL E/S HNDSWCH ROCKRBTN HOLSTR 10FT

## (undated) DEVICE — TREK CORONARY DILATATION CATHETER 2.50 MM X 15 MM / RAPID-EXCHANGE: Brand: TREK

## (undated) DEVICE — GW PRESS VERRATA STR 185CM

## (undated) DEVICE — IRRIGATOR BULB ASEPTO 60CC STRL

## (undated) DEVICE — GUIDE CATHETER: Brand: MACH1™

## (undated) DEVICE — SOL NACL 0.9PCT 1000ML

## (undated) DEVICE — GW EXCHG TSCF .035 260CM 3MM

## (undated) DEVICE — MODEL AT P54, P/N 700608-035KIT CONTENTS: HAND CONTROLLER, 3-WAY HIGH-PRESSURE STOPCOCK WITH ROTATING END AND PREMIUM HIGH-PRESSURE TUBING: Brand: ANGIOTOUCH® KIT

## (undated) DEVICE — Device: Brand: ASAHI SION BLUE

## (undated) DEVICE — ADULT, W/LG. BACK PAD, RADIOTRANSPARENT ELEMENT AND LEAD WIRE: Brand: DEFIBRILLATION ELECTRODES

## (undated) DEVICE — MINI TREK CORONARY DILATATION CATHETER 2.0 MM X 20 MM / RAPID-EXCHANGE: Brand: MINI TREK

## (undated) DEVICE — TUBING, SUCTION, 1/4" X 10', STRAIGHT: Brand: MEDLINE

## (undated) DEVICE — NC TREK CORONARY DILATATION CATHETER 2.75 MM X 12 MM / RAPID-EXCHANGE: Brand: NC TREK

## (undated) DEVICE — CANN NASL CO2 DIVIDED A/

## (undated) DEVICE — ST INF PRI SMRTSTE 20DRP 2VLV 24ML 117

## (undated) DEVICE — CATH DIAG EXPO M/ PK 5F FL4/FR4 PIG

## (undated) DEVICE — A2000 MULTI-USE SYRINGE KIT, P/N 701277-003KIT CONTENTS: 100ML CONTRAST RESERVOIR AND TUBING WITH CONTRAST SPIKE AND CLAMP: Brand: A2000 MULTI-USE SYRINGE KIT

## (undated) DEVICE — DECANT BG O JET

## (undated) DEVICE — CATH DIAG EXPO .045 FL3.5 5F 100CM

## (undated) DEVICE — DECANTER: Brand: UNBRANDED

## (undated) DEVICE — PK CATH CARD 10

## (undated) DEVICE — MODEL BT2000 P/N 700287-012KIT CONTENTS: MANIFOLD WITH SALINE AND CONTRAST PORTS, SALINE TUBING WITH SPIKE AND HAND SYRINGE, TRANSDUCER: Brand: BT2000 AUTOMATED MANIFOLD KIT

## (undated) DEVICE — SET PRIMARY GRVTY 10DP MALE LL 104IN

## (undated) DEVICE — NC TREK CORONARY DILATATION CATHETER 3.5 MM X 8 MM / RAPID-EXCHANGE: Brand: NC TREK

## (undated) DEVICE — CAUTERY TIP POLISHER: Brand: DEVON